# Patient Record
Sex: FEMALE | Race: WHITE | NOT HISPANIC OR LATINO | Employment: OTHER | URBAN - METROPOLITAN AREA
[De-identification: names, ages, dates, MRNs, and addresses within clinical notes are randomized per-mention and may not be internally consistent; named-entity substitution may affect disease eponyms.]

---

## 2017-06-30 ENCOUNTER — GENERIC CONVERSION - ENCOUNTER (OUTPATIENT)
Dept: OTHER | Facility: OTHER | Age: 57
End: 2017-06-30

## 2017-06-30 ENCOUNTER — ALLSCRIPTS OFFICE VISIT (OUTPATIENT)
Dept: OTHER | Facility: OTHER | Age: 57
End: 2017-06-30

## 2018-01-22 VITALS
SYSTOLIC BLOOD PRESSURE: 136 MMHG | DIASTOLIC BLOOD PRESSURE: 96 MMHG | TEMPERATURE: 98 F | OXYGEN SATURATION: 97 % | HEART RATE: 88 BPM | HEIGHT: 67 IN | BODY MASS INDEX: 27.64 KG/M2 | WEIGHT: 176.13 LBS

## 2018-01-24 NOTE — MISCELLANEOUS
Assessment   1  Heat exhaustion (992 5) (T67 5XXA)1      1 Amended By: Sharmaine Zarco; Jun 30 2017 8:15 AM EST    Discussion/Summary  Discussion Summary:   We discussed John Soto is recovering from heat exhaustion  May take a full month to recover completely  Some of her mental fogginess may be related to her medication for motion sickness  She does not have this with her but most likely this contains an antihistamine or another medicine that has anticholinergic effects such as Dramamine, so this medicine was discontinued  The concept of pre-hydration, as well as keeping up with fluid intake when out of doors on hot days when it's humid was discussed  When she does resume full activity, which I did not recommend until 4 months number episode of heat exhaustion, she should pay attention to any symptoms and given to worsen her conditioning and otherwise rest if she notices returning symptoms  Otherwise follow-up can be as needed  1        1 Amended By: Sharmaine Zarco; Jun 30 2017 8:20 AM EST    History of Present Illness  TCM Communication [facility]: The patient is being contacted for follow-up after hospitalization  She was hospitalized [facility] in [place]  The date of admission: 6/22/17, date of discharge: 6/24/17  Diagnosis: dizzyness/vomitting  She was discharged to home  She scheduled a follow up appointment  Symptoms: dizziness  Counseling was provided to the patient  Communication performed and completed by   HPI: John Soto is here today with her  and was last seen here on June 8, 2015  She was on vacation recently and playing golf in [place], and the weather was very hot and humid today before she became lightheaded and dizzy while outdoors playing golf on the second day   She then went to the local emergency room on June 22, whereby she received intravenous fluids, IV Zofran, had a noncontrast CT of the brain that was negative, have lab work consistent with dehydration, no evidence of infection, and was discharged the same day  She was given an over-the-counter medication for motion sickness, which he has been taking since  Her entire record was faxed to this office yesterday and was reviewed  She feels somewhat washed out, occasionally slightly lightheaded when she drops her head and then stands up  She feels slightly mentally foggy  There are no focal neurologic symptoms  There is no chest pain or shortness of breath  Her appetite is returned, and she is eating and drinking well and has not engaged in any athletic activities or has been out of doors on hot weather days  There are no new complaints since her return visit  1        1 Amended By: Yg Olivarez; Jun 30 2017 8:18 AM EST    Active Problems   1  Allergic rhinitis (477 9) (J30 9)  2  Anxiety (300 00) (F41 9)  3  Eczema (692 9) (L30 9)  4  Hyperlipidemia (272 4) (E78 5)  5  Osteoarthrosis of knee (715 36) (M17 10)  6  Raynaud's syndrome without gangrene (443 0) (I73 00)  7  Vitamin D deficiency (268 9) (E55 9)    Past Medical History   1  History of Cubital tunnel syndrome on left (354 2) (G56 22)  2  Denied: History of abnormal cervical Pap smear  3  History of esophageal reflux (V12 79) (Z87 19)  4  Denied: History of herpes simplex infection  5  Denied: History of pregnancy  6  History of varicella (V12 09) (Z86 19)    Surgical History   1  History of Colonoscopy (Fiberoptic) Cecum  2  History of Oral Surgery Tooth Extraction  3  History of Tonsillectomy    Family History  Father   1  Family history of hypercholesterolemia (V18 19) (Z83 42)  Maternal Grandmother   2  Family history of Brain Cancer (V16 8)  Maternal Grandfather   3  Family history of stroke (V17 1) (Z82 3)    Social History    · Alcohol use (V49 89) (Z78 9)   · Denied: History of drug use   · Never A Smoker   · Occupation   · Taoist Affiliation    Social History Reviewed: The social history was reviewed and updated today  1  The social history was reviewed and is unchanged  1        1 Amended By: Merly Ayon; Jun 30 2017 7:59 AM EST    Current Meds  1  Citracal Slow Release 600- MG-MG-UNIT Oral Tablet Extended Release 24 Hour;   take 2 tablet daily; Therapy: 23YKK5302 to Recorded  2  Glucosamine Chondroitin MSM Oral Tablet; take 2 tablet daily; Therapy: 79RLE4848 to Recorded  3  Montelukast Sodium 10 MG Oral Tablet; TAKE 1 TABLET DAILY; Therapy: 30GGN8163 to (Evaluate:40Cra8948)  Requested for: 30APB5631; Last   Rx:45Awp7597 Ordered  4  PARoxetine HCl - 10 MG Oral Tablet; Take 1 tablet daily; Therapy: 23EJR2751 to (Last Rx:45Rdf3116)  Requested for: 79Yle8345 Ordered  5  Vitamin C 500 MG Oral Tablet; TAKE 1 TABLET DAILY; Therapy: 70ACD0111 to Recorded  6  Vitamin E 400 UNIT Oral Capsule; take 1 capsule daily; Therapy: 58THL3195 to Recorded    Allergies   1  Penicillins  2  Sulfa Drugs    Physical Exam    Constitutional1  Appears well, vital signs stable with regular pulse at 72  Repeat blood pressure is 132/82 left arm sitting position  Skin and mucous membranes are well-hydrated  There is no rash  HEENT exam is normal  There is no nystagmus or inducible vertigo  Lungs are clear and cardiac exam is normal  There is no peripheral edema and circulation intact  Gait is steady and normal  Sensorium is clear  1           1 Amended By: Evita Rodriguez; Jun 30 2017 8:18 AM EST    Future Appointments    Date/Time Provider Specialty Site   06/30/2017 08:00 AM MALCOLM Bowers   Internal Medicine [facility]     Signatures   Electronically signed by : MALCOLM Green ; Jun 26 2017  9:39AM EST                       (Author)    Electronically signed by : MALCOLM Green ; Jun 30 2017  8:20AM EST                       (Author)

## 2018-04-10 DIAGNOSIS — F41.9 ANXIETY: Primary | ICD-10-CM

## 2018-04-10 RX ORDER — PAROXETINE 10 MG/1
10 TABLET, FILM COATED ORAL DAILY
Qty: 90 TABLET | Refills: 0 | Status: SHIPPED | OUTPATIENT
Start: 2018-04-10 | End: 2018-07-18 | Stop reason: SDUPTHER

## 2018-04-10 RX ORDER — PAROXETINE 10 MG/1
1 TABLET, FILM COATED ORAL DAILY
COMMUNITY
Start: 2013-05-31 | End: 2018-04-10 | Stop reason: SDUPTHER

## 2018-07-18 DIAGNOSIS — F41.9 ANXIETY: ICD-10-CM

## 2018-07-18 RX ORDER — PAROXETINE 10 MG/1
10 TABLET, FILM COATED ORAL DAILY
Qty: 90 TABLET | Refills: 1 | Status: SHIPPED | OUTPATIENT
Start: 2018-07-18 | End: 2018-07-20 | Stop reason: SDUPTHER

## 2018-07-20 DIAGNOSIS — F41.9 ANXIETY: ICD-10-CM

## 2018-07-20 RX ORDER — PAROXETINE 10 MG/1
10 TABLET, FILM COATED ORAL DAILY
Qty: 90 TABLET | Refills: 0 | Status: SHIPPED | OUTPATIENT
Start: 2018-07-20 | End: 2018-10-23 | Stop reason: SDUPTHER

## 2018-10-23 DIAGNOSIS — F41.9 ANXIETY: ICD-10-CM

## 2018-10-23 RX ORDER — PAROXETINE 10 MG/1
TABLET, FILM COATED ORAL
Qty: 90 TABLET | Refills: 0 | Status: SHIPPED | OUTPATIENT
Start: 2018-10-23 | End: 2019-01-21 | Stop reason: SDUPTHER

## 2019-01-21 DIAGNOSIS — F41.9 ANXIETY: ICD-10-CM

## 2019-01-21 RX ORDER — PAROXETINE 10 MG/1
TABLET, FILM COATED ORAL
Qty: 90 TABLET | Refills: 0 | Status: SHIPPED | OUTPATIENT
Start: 2019-01-21 | End: 2019-04-21 | Stop reason: SDUPTHER

## 2019-04-21 DIAGNOSIS — F41.9 ANXIETY: ICD-10-CM

## 2019-04-22 RX ORDER — PAROXETINE 10 MG/1
TABLET, FILM COATED ORAL
Qty: 90 TABLET | Refills: 0 | Status: SHIPPED | OUTPATIENT
Start: 2019-04-22 | End: 2019-07-23 | Stop reason: SDUPTHER

## 2019-05-16 ENCOUNTER — APPOINTMENT (OUTPATIENT)
Dept: LAB | Facility: CLINIC | Age: 59
End: 2019-05-16
Payer: COMMERCIAL

## 2019-05-16 ENCOUNTER — TRANSCRIBE ORDERS (OUTPATIENT)
Dept: LAB | Facility: CLINIC | Age: 59
End: 2019-05-16

## 2019-05-16 DIAGNOSIS — D75.89 BONE MARROW HYPERPLASIA: Primary | ICD-10-CM

## 2019-05-16 DIAGNOSIS — D75.89 BONE MARROW HYPERPLASIA: ICD-10-CM

## 2019-05-16 LAB
FOLATE SERPL-MCNC: >20 NG/ML (ref 3.1–17.5)
VIT B12 SERPL-MCNC: 780 PG/ML (ref 100–900)

## 2019-05-16 PROCEDURE — 36415 COLL VENOUS BLD VENIPUNCTURE: CPT

## 2019-05-16 PROCEDURE — 82607 VITAMIN B-12: CPT

## 2019-05-16 PROCEDURE — 82746 ASSAY OF FOLIC ACID SERUM: CPT

## 2019-07-23 DIAGNOSIS — F41.9 ANXIETY: ICD-10-CM

## 2019-07-23 RX ORDER — PAROXETINE 10 MG/1
TABLET, FILM COATED ORAL
Qty: 90 TABLET | Refills: 0 | Status: SHIPPED | OUTPATIENT
Start: 2019-07-23 | End: 2019-10-21 | Stop reason: SDUPTHER

## 2019-10-21 DIAGNOSIS — F41.9 ANXIETY: ICD-10-CM

## 2019-10-21 RX ORDER — PAROXETINE 10 MG/1
TABLET, FILM COATED ORAL
Qty: 90 TABLET | Refills: 4 | Status: SHIPPED | OUTPATIENT
Start: 2019-10-21

## 2019-12-11 ENCOUNTER — TELEPHONE (OUTPATIENT)
Dept: INTERNAL MEDICINE CLINIC | Facility: CLINIC | Age: 59
End: 2019-12-11

## 2020-08-21 ENCOUNTER — APPOINTMENT (EMERGENCY)
Dept: RADIOLOGY | Facility: HOSPITAL | Age: 60
End: 2020-08-21
Payer: COMMERCIAL

## 2020-08-21 ENCOUNTER — HOSPITAL ENCOUNTER (EMERGENCY)
Facility: HOSPITAL | Age: 60
Discharge: HOME | End: 2020-08-22
Attending: EMERGENCY MEDICINE
Payer: COMMERCIAL

## 2020-08-21 DIAGNOSIS — S01.01XA LACERATION OF SCALP, INITIAL ENCOUNTER: ICD-10-CM

## 2020-08-21 DIAGNOSIS — S09.90XA INJURY OF HEAD, INITIAL ENCOUNTER: ICD-10-CM
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
## 2020-08-21 PROCEDURE — G1004 CDSM NDSC: HCPCS

## 2020-08-21 PROCEDURE — 72125 CT NECK SPINE W/O DYE: CPT

## 2020-08-21 PROCEDURE — 99284 EMERGENCY DEPT VISIT MOD MDM: CPT

## 2020-08-21 PROCEDURE — 12002 RPR S/N/AX/GEN/TRNK2.6-7.5CM: CPT | Performed by: EMERGENCY MEDICINE

## 2020-08-21 PROCEDURE — 70450 CT HEAD/BRAIN W/O DYE: CPT

## 2020-08-21 PROCEDURE — 99282 EMERGENCY DEPT VISIT SF MDM: CPT | Performed by: EMERGENCY MEDICINE

## 2020-08-22 VITALS
BODY MASS INDEX: 31.34 KG/M2 | TEMPERATURE: 97.7 F | WEIGHT: 195 LBS | OXYGEN SATURATION: 97 % | SYSTOLIC BLOOD PRESSURE: 144 MMHG | HEART RATE: 75 BPM | DIASTOLIC BLOOD PRESSURE: 82 MMHG | RESPIRATION RATE: 18 BRPM | HEIGHT: 66 IN

## 2020-08-22 PROCEDURE — 90471 IMMUNIZATION ADMIN: CPT

## 2020-08-22 PROCEDURE — 90715 TDAP VACCINE 7 YRS/> IM: CPT | Performed by: EMERGENCY MEDICINE

## 2020-08-22 RX ADMIN — TETANUS TOXOID, REDUCED DIPHTHERIA TOXOID AND ACELLULAR PERTUSSIS VACCINE, ADSORBED 0.5 ML: 5; 2.5; 8; 8; 2.5 SUSPENSION INTRAMUSCULAR at 00:07

## 2020-08-22 NOTE — ED ATTENDING ATTESTATION
8/21/2020  I, Vincent Rodriguez MD, saw and evaluated the patient  I have discussed the patient with the resident/non-physician practitioner and agree with the resident's/non-physician practitioner's findings, Plan of Care, and MDM as documented in the resident's/non-physician practitioner's note, except where noted  All available labs and Radiology studies were reviewed  I was present for key portions of any procedure(s) performed by the resident/non-physician practitioner and I was immediately available to provide assistance  At this point I agree with the current assessment done in the Emergency Department  I have conducted an independent evaluation of this patient a history and physical is as follows:    ED Course         Critical Care Time  Procedures     57 yo female had 3 alcoholic drinks today fell multiple times and hit head  No loc  Pt with no complaints but noted to have laceration on scalp  Pt with abrasion on nose  No cp, no sob, no abdominal pain, no headache, no n/v/d  Vss, afebrile, lungs cta, rrr, appears intoxicated, abdomen soft nontender, scalp laceration 5 cm, no neuro deficits, no spinal tenderness  Ct head, cspine  Tetanus, staple

## 2020-08-22 NOTE — ED PROVIDER NOTES
History  Chief Complaint   Patient presents with    Fall     Patient fell twice tonight after having 3 vodka tonics  One fall was outside in the yard (witnessed by ) and another unwitnessed in the bathroom  Lac to left side of scalp  Nose swollen and painful  Sarah Meals is a 77-year-old woman with a significant past medical history presenting today after falling at home  Patient states she had several drinks out at dinner with friends  When her and her  arrived home, he let her out of the car and she fell face forward onto the driveway  At this time she bumped her nose and had a small abrasion to her nose  Her  helped her up into the house  She then went into the bathroom unaccompanied  Her 's heard a loud noise and found her lying on the bathroom floor with a lot of blood  He helped her up and then called EMS  EMS arrived, placed the patient in a c-collar, transferred the patient to the hospital, delivering no medications or treatment EN route  At the hospital patient states that she feels fine and wants to go home  She is very pleasant and cooperative  She has no complaints at this time  She reports no other injuries  Her  reports no other injuries  No LOC  She states that she has no neck pain, headache, lightheadedness, dizziness, shortness of breath, chest pain, palpitations, abdominal pain, extremity pain nausea, vomiting  Prior to Admission Medications   Prescriptions Last Dose Informant Patient Reported? Taking? PARoxetine (PAXIL) 10 mg tablet   No No   Sig: TAKE 1 TABLET DAILY      Facility-Administered Medications: None       History reviewed  No pertinent past medical history  History reviewed  No pertinent surgical history  History reviewed  No pertinent family history  I have reviewed and agree with the history as documented      E-Cigarette/Vaping     E-Cigarette/Vaping Substances     Social History     Tobacco Use    Smoking status: Never Smoker    Smokeless tobacco: Never Used   Substance Use Topics    Alcohol use: Yes     Frequency: Monthly or less     Drinks per session: 1 or 2     Binge frequency: Never    Drug use: Never        Review of Systems   Constitutional: Negative for chills, diaphoresis and fever  Respiratory: Negative for shortness of breath  Cardiovascular: Negative for chest pain and palpitations  Gastrointestinal: Negative for abdominal pain, diarrhea, nausea and vomiting  Genitourinary: Negative for pelvic pain  Musculoskeletal: Negative for back pain and neck pain  Skin: Positive for wound  Neurological: Negative for dizziness, weakness, light-headedness and headaches  All other systems reviewed and are negative  Physical Exam  ED Triage Vitals [08/21/20 2255]   Temperature Pulse Respirations Blood Pressure SpO2   97 7 °F (36 5 °C) 86 18 142/83 94 %      Temp Source Heart Rate Source Patient Position - Orthostatic VS BP Location FiO2 (%)   Oral Monitor Lying Right arm --      Pain Score       --             Orthostatic Vital Signs  Vitals:    08/21/20 2255 08/21/20 2300 08/22/20 0033   BP: 142/83 136/77 144/82   Pulse: 86 86 75   Patient Position - Orthostatic VS: Lying Lying Lying       Physical Exam  Constitutional:       General: She is not in acute distress  Appearance: Normal appearance  She is not ill-appearing or diaphoretic  HENT:      Head: Normocephalic  Abrasion and laceration present  Nose: Nose normal    Eyes:      General: No scleral icterus  Extraocular Movements: Extraocular movements intact  Conjunctiva/sclera: Conjunctivae normal       Pupils: Pupils are equal, round, and reactive to light  Neck:      Musculoskeletal: No muscular tenderness  Cardiovascular:      Rate and Rhythm: Normal rate and regular rhythm  Pulses: Normal pulses  Heart sounds: Normal heart sounds  No murmur     Pulmonary:      Effort: Pulmonary effort is normal  No respiratory distress  Breath sounds: Normal breath sounds  No wheezing or rales  Abdominal:      General: There is no distension  Palpations: Abdomen is soft  Tenderness: There is no abdominal tenderness  There is no guarding  Musculoskeletal:         General: No deformity  Neurological:      General: No focal deficit present  Mental Status: She is alert and oriented to person, place, and time  GCS: GCS eye subscore is 4  GCS verbal subscore is 5  GCS motor subscore is 6  Cranial Nerves: Cranial nerves are intact  No cranial nerve deficit, dysarthria or facial asymmetry  Sensory: No sensory deficit  Motor: Motor function is intact  No weakness  Coordination: Finger-Nose-Finger Test normal          ED Medications  Medications   tetanus-diphtheria-acellular pertussis (BOOSTRIX) IM injection 0 5 mL (0 5 mL Intramuscular Given 8/22/20 0007)       Diagnostic Studies  Results Reviewed     None                 CT head without contrast   Final Result by Jamin Schultz MD (08/22 7358)      1  No acute intracranial hemorrhage or mass effect  2  Bilateral nasal bone fractures  Workstation performed: [id number]         CT cervical spine without contrast   Final Result by Jamin Schultz MD (08/22 0031)      No acute cervical spine fracture or traumatic malalignment  Workstation performed: [id number]               Procedures  Procedures      ED Course       US AUDIT      Most Recent Value   Initial Alcohol Screen: US AUDIT-C    1  How often do you have a drink containing alcohol? 1 Filed at: 08/21/2020 2253   2  How many drinks containing alcohol do you have on a typical day you are drinking? 2 Filed at: 08/21/2020 2253   3b  FEMALE Any Age, or MALE 65+: How often do you have 4 or more drinks on one occassion?   0 Filed at: 08/21/2020 2253   Audit-C Score  3 Filed at: 08/21/2020 2253                  JOSESITO/DAST-10      Most Recent Value   How many times in the past year have you    Used an illegal drug or used a prescription medication for non-medical reasons? Never Filed at: 08/21/2020 4925                              MDM  Number of Diagnoses or Management Options  Fall, initial encounter:   Injury of head, initial encounter:   Laceration of scalp, initial encounter:   Diagnosis management comments: 55 yo woman presenting after mechanical fall at home while intoxicated  No other symptoms present before or after fall, low suspicion for other medical conditions causing the fall  Will evaluate with CT c-spine and head  Wound washed out with copious normal saline  Closed with staples  CT spine and head show nasal bone fractures  No intracranial hemorrhage present, no cervical spine fracture present  Patient re-evaluated at 12:40AM, still unsteady on feet   present at gait assessment, states he is uncomfortable taking her home in this condition  Will continue to allow patient to sober up and will reassess  Patient signed out to senior resident  Disposition  Final diagnoses:   Fall, initial encounter   Injury of head, initial encounter   Laceration of scalp, initial encounter     Time reflects when diagnosis was documented in both MDM as applicable and the Disposition within this note     Time User Action Codes Description Comment    8/22/2020 12:23 AM Radha Her Add [W19  SNFP] Fall, initial encounter     8/22/2020 12:23 AM Radha Her Add [G22 87TT] Injury of head, initial encounter     8/22/2020 12:23 AM Radha Her Add [S01 01XA] Laceration of scalp, initial encounter       ED Disposition     ED Disposition Condition Date/Time Comment    Discharge Stable Sat Aug 22, 2020 12:50 AM Zohreh Reed discharge to home/self care  Follow-up Information     Follow up With Specialties Details Why Contact Info Additional Information    Calista Sher MD Internal Medicine   [address]   [employer] [facility] Emergency Department Emergency Medicine  If symptoms worsen, For suture removal [address] [place] [address] ED, [address] [place] [address], [address] [phone]          Patient's Medications   Discharge Prescriptions    No medications on file     No discharge procedures on file  PDMP Review     None           ED Provider  Attending physically available and evaluated Merlyn Stahl I managed the patient along with the ED Attending      Electronically Signed by         Terrell Pantoja MD  08/22/20 3998       Terrell Pantoja MD  08/22/20 6179

## 2020-08-22 NOTE — ED NOTES
Dr Iván Barney at the bedside for patient evaluation at this time     Alaina Edwards RN  08/21/20 4422

## 2020-08-22 NOTE — DISCHARGE INSTRUCTIONS
You were seen in the emergency department today after a fall  We performed CT scan of your head and neck  We do not find fractures or bleeds  We stapled the laceration in your head shut  You should see a doctor in 10-14 days to have the staples removed  Please try to keep the area clean and dry  If you find the wound is becoming uncomfortable and tugging at your skin, you can apply Vaseline to the area  If you become confused, weak in one area, develop a headache, have any changes in your vision, please return to the emergency room  If you find that the wound is becoming red inflamed, draining pus, or you develop fevers, please return to the emergency room

## 2020-08-22 NOTE — ED PROCEDURE NOTE
PROCEDURE  Laceration repair    Date/Time: 8/22/2020 12:30 AM  Performed by: Shantelle Archuleta MD  Authorized by: Shantelle Archuleta MD   Consent: Verbal consent obtained    Consent given by: patient  Patient identity confirmed: verbally with patient and arm band  Body area: head/neck  Location details: scalp  Laceration length: 4 cm  Foreign bodies: no foreign bodies  Tendon involvement: none  Nerve involvement: none  Vascular damage: no  Anesthesia: local infiltration    Anesthesia:  Local Anesthetic: lidocaine 1% with epinephrine  Anesthetic total: 4 mL    Sedation:  Patient sedated: no      Wound Dehiscence:  Superficial Wound Dehiscence: simple closure      Procedure Details:  Irrigation solution: tap water  Skin closure: staples  Number of sutures: 6  Patient tolerance: Patient tolerated the procedure well with no immediate complications           Shantelle Archuleta MD  08/22/20 1026

## 2021-09-23 ENCOUNTER — APPOINTMENT (OUTPATIENT)
Dept: RADIOLOGY | Facility: CLINIC | Age: 61
End: 2021-09-23
Payer: COMMERCIAL

## 2021-09-23 DIAGNOSIS — M54.50 LOW BACK PAIN, UNSPECIFIED BACK PAIN LATERALITY, UNSPECIFIED CHRONICITY, UNSPECIFIED WHETHER SCIATICA PRESENT: ICD-10-CM

## 2021-09-23 DIAGNOSIS — M54.10 RADICULAR SYNDROME OF LOWER LIMBS: ICD-10-CM

## 2021-09-23 PROCEDURE — 72050 X-RAY EXAM NECK SPINE 4/5VWS: CPT

## 2021-09-23 PROCEDURE — 72110 X-RAY EXAM L-2 SPINE 4/>VWS: CPT

## 2022-05-20 ENCOUNTER — OFFICE VISIT (OUTPATIENT)
Dept: URGENT CARE | Facility: CLINIC | Age: 62
End: 2022-05-20
Payer: COMMERCIAL

## 2022-05-20 VITALS
HEART RATE: 68 BPM | HEIGHT: 66 IN | DIASTOLIC BLOOD PRESSURE: 98 MMHG | TEMPERATURE: 98.7 F | OXYGEN SATURATION: 98 % | WEIGHT: 191 LBS | SYSTOLIC BLOOD PRESSURE: 130 MMHG | BODY MASS INDEX: 30.7 KG/M2 | RESPIRATION RATE: 16 BRPM

## 2022-05-20 DIAGNOSIS — H92.02 EARACHE ON LEFT: Primary | ICD-10-CM

## 2022-05-20 PROCEDURE — 99213 OFFICE O/P EST LOW 20 MIN: CPT | Performed by: PHYSICIAN ASSISTANT

## 2022-05-20 RX ORDER — CALCIUM CARBONATE/VITAMIN D3 600 MG-10
TABLET ORAL
COMMUNITY

## 2022-05-20 RX ORDER — METOPROLOL SUCCINATE 50 MG/1
1 TABLET, EXTENDED RELEASE ORAL DAILY
COMMUNITY
Start: 2021-11-15 | End: 2022-11-16

## 2022-05-20 RX ORDER — BUDESONIDE 0.25 MG/2ML
INHALANT ORAL
COMMUNITY

## 2022-05-20 RX ORDER — SPIRONOLACTONE 50 MG/1
1 TABLET, FILM COATED ORAL DAILY
COMMUNITY
Start: 2021-12-24

## 2022-05-20 RX ORDER — ATORVASTATIN CALCIUM 40 MG/1
TABLET, FILM COATED ORAL
COMMUNITY

## 2022-05-20 RX ORDER — OMEPRAZOLE 40 MG/1
40 CAPSULE, DELAYED RELEASE ORAL
COMMUNITY

## 2022-05-20 RX ORDER — MELOXICAM 15 MG/1
15 TABLET ORAL
COMMUNITY

## 2022-05-20 RX ORDER — MONTELUKAST SODIUM 10 MG/1
TABLET ORAL
COMMUNITY
Start: 2013-03-22

## 2022-05-20 RX ORDER — VALSARTAN AND HYDROCHLOROTHIAZIDE 320; 25 MG/1; MG/1
1 TABLET, FILM COATED ORAL
COMMUNITY
Start: 2022-02-10 | End: 2022-08-10

## 2022-05-20 RX ORDER — LEVOTHYROXINE SODIUM 0.07 MG/1
1 TABLET ORAL DAILY
COMMUNITY
Start: 2022-01-17

## 2022-05-20 NOTE — PROGRESS NOTES
[facility]        NAME: Walter Braswell is a 59 y o  female  : 1960    MRN: [id number]  DATE: May 20, 2022  TIME: 5:04 PM    Assessment and Plan   Earache on left [H92 02]  1  Earache on left           Patient Instructions     External ear canals completely void of cerumen b/l  TMs clear w/ good cone of light b/l  Recommend OTC flonase or nasacort  Monitor for worsening symptoms  Discussed possible eustachian tube dysfunction  Follow up with PCP in 3-5 days  Proceed to  ER if symptoms worsen  Chief Complaint     Chief Complaint   Patient presents with    Cerumen Impaction     Left ear clogged since   History of Present Illness       Patient presents with complaint of her left ear feeling clogged since   She describes her hearing as muffled and states it initially felt like there was a vibration when people were talking to her  She states that she has tried over-the-counter earwax softening drops followed by sudafed without improvement  She states that she was able to pop the right ear with a valsalva maneuver but not the left  She denies fever, chills, sweats, congestion, sinus pressure, ear pain, and drainage  She reports taking zyrtec for allergies  Review of Systems   Review of Systems   Constitutional: Negative for chills and fever  HENT: Negative for congestion, ear discharge, ear pain and sore throat  Eyes: Negative for pain and visual disturbance  Respiratory: Negative for cough and shortness of breath  Cardiovascular: Negative for chest pain and palpitations  Gastrointestinal: Negative for abdominal pain and vomiting  Genitourinary: Negative for dysuria and hematuria  Musculoskeletal: Negative for arthralgias and back pain  Skin: Negative for color change and rash  Neurological: Negative for seizures and syncope  All other systems reviewed and are negative          Current Medications       Current Outpatient Medications:    levothyroxine 75 mcg tablet, Take 1 tablet by mouth in the morning , Disp: , Rfl:     metoprolol succinate (TOPROL-XL) 50 mg 24 hr tablet, Take 1 tablet by mouth in the morning , Disp: , Rfl:     montelukast (SINGULAIR) 10 mg tablet, , Disp: , Rfl:     spironolactone (ALDACTONE) 50 mg tablet, Take 1 tablet by mouth in the morning , Disp: , Rfl:     valsartan-hydrochlorothiazide (DIOVAN-HCT) 320-25 MG per tablet, Take 1 tablet by mouth, Disp: , Rfl:     atorvastatin (LIPITOR) 40 mg tablet, atorvastatin Take No date recorded No form recorded No frequency recorded No route recorded No set duration recorded No set duration amount recorded active No dosage strength recorded No dosage strength units of measure recorded, Disp: , Rfl:     budesonide (Pulmicort) 0 25 mg/2 mL nebulizer solution, Inhale, Disp: , Rfl:     Calcium Carb-Cholecalciferol 600-400 MG-UNIT TABS, Take by mouth, Disp: , Rfl:     meloxicam (MOBIC) 15 mg tablet, Take 15 mg by mouth, Disp: , Rfl:     omeprazole (PriLOSEC) 40 MG capsule, Take 40 mg by mouth, Disp: , Rfl:     PARoxetine (PAXIL) 10 mg tablet, TAKE 1 TABLET DAILY, Disp: 90 tablet, Rfl: 4    Current Allergies     Allergies as of 05/20/2022 - Reviewed 05/20/2022   Allergen Reaction Noted    Amlodipine Other (See Comments) 05/13/2021    Sulfa antibiotics  08/21/2020    Penicillins Rash 08/21/2020            The following portions of the patient's history were reviewed and updated as appropriate: allergies, current medications, past family history, past medical history, past social history, past surgical history and problem list      No past medical history on file  No past surgical history on file  No family history on file  Medications have been verified  Objective   /98   Pulse 68   Temp 98 7 °F (37 1 °C)   Resp 16   Ht 5' 6" (1 676 m)   Wt 86 6 kg (191 lb)   SpO2 98%   BMI 30 83 kg/m²   No LMP recorded   Patient is postmenopausal        Physical Exam Physical Exam  Vitals and nursing note reviewed  Constitutional:       General: She is not in acute distress  Appearance: Normal appearance  She is well-developed  She is not ill-appearing or diaphoretic  HENT:      Head: Normocephalic and atraumatic  Right Ear: Tympanic membrane, ear canal and external ear normal  There is no impacted cerumen  Left Ear: Tympanic membrane, ear canal and external ear normal  There is no impacted cerumen  Nose: Rhinorrhea present  Mouth/Throat:      Mouth: Mucous membranes are moist       Pharynx: Oropharynx is clear  No oropharyngeal exudate or posterior oropharyngeal erythema  Eyes:      General:         Right eye: No discharge  Left eye: No discharge  Conjunctiva/sclera: Conjunctivae normal       Pupils: Pupils are equal, round, and reactive to light  Cardiovascular:      Rate and Rhythm: Normal rate and regular rhythm  Heart sounds: Normal heart sounds  Pulmonary:      Effort: Pulmonary effort is normal  No respiratory distress  Breath sounds: Normal breath sounds  No stridor  Musculoskeletal:      Cervical back: Normal range of motion and neck supple  Lymphadenopathy:      Cervical: No cervical adenopathy  Skin:     General: Skin is warm and dry  Capillary Refill: Capillary refill takes less than 2 seconds  Findings: No rash  Neurological:      Mental Status: She is alert and oriented to person, place, and time  Cranial Nerves: No cranial nerve deficit  Sensory: No sensory deficit  Psychiatric:         Behavior: Behavior normal          Thought Content:  Thought content normal

## 2022-06-28 DIAGNOSIS — S00.06XA TICK BITE OF SCALP, INITIAL ENCOUNTER: Primary | ICD-10-CM
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
## 2022-06-28 RX ORDER — DOXYCYCLINE 100 MG/1
TABLET ORAL
Qty: 2 TABLET | Refills: 0 | Status: SHIPPED | OUTPATIENT
Start: 2022-06-28 | End: 2022-06-28

## 2022-07-20 ENCOUNTER — HOSPITAL ENCOUNTER (OUTPATIENT)
Dept: RADIOLOGY | Facility: HOSPITAL | Age: 62
Discharge: HOME | End: 2022-07-20
Attending: STUDENT IN AN ORGANIZED HEALTH CARE EDUCATION/TRAINING PROGRAM
Payer: COMMERCIAL

## 2022-07-20 DIAGNOSIS — H91.8X9 ASYMMETRICAL HEARING LOSS: ICD-10-CM

## 2022-07-20 PROCEDURE — A9585 GADOBUTROL INJECTION: HCPCS | Performed by: RADIOLOGY

## 2022-07-20 PROCEDURE — G1004 CDSM NDSC: HCPCS

## 2022-07-20 PROCEDURE — 70553 MRI BRAIN STEM W/O & W/DYE: CPT

## 2022-07-20 RX ADMIN — GADOBUTROL 8 ML: 604.72 INJECTION INTRAVENOUS at 19:45

## 2023-06-22 ENCOUNTER — HOSPITAL ENCOUNTER (OUTPATIENT)
Dept: RADIOLOGY | Facility: HOSPITAL | Age: 63
Discharge: HOME | End: 2023-06-22
Attending: ORTHOPAEDIC SURGERY
Payer: COMMERCIAL

## 2023-06-22 ENCOUNTER — OFFICE VISIT (OUTPATIENT)
Dept: OBGYN CLINIC | Facility: HOSPITAL | Age: 63
End: 2023-06-22
Payer: COMMERCIAL

## 2023-06-22 VITALS
HEART RATE: 93 BPM | WEIGHT: 194 LBS | SYSTOLIC BLOOD PRESSURE: 115 MMHG | BODY MASS INDEX: 31.18 KG/M2 | HEIGHT: 66 IN | DIASTOLIC BLOOD PRESSURE: 73 MMHG

## 2023-06-22 DIAGNOSIS — M25.562 PAIN IN BOTH KNEES, UNSPECIFIED CHRONICITY: ICD-10-CM

## 2023-06-22 DIAGNOSIS — M17.0 PRIMARY OSTEOARTHRITIS OF BOTH KNEES: ICD-10-CM

## 2023-06-22 DIAGNOSIS — M25.561 PAIN IN BOTH KNEES, UNSPECIFIED CHRONICITY: ICD-10-CM

## 2023-06-22 DIAGNOSIS — M25.562 PAIN IN BOTH KNEES, UNSPECIFIED CHRONICITY: Primary | ICD-10-CM

## 2023-06-22 DIAGNOSIS — M25.561 PAIN IN BOTH KNEES, UNSPECIFIED CHRONICITY: Primary | ICD-10-CM

## 2023-06-22 PROCEDURE — 73562 X-RAY EXAM OF KNEE 3: CPT

## 2023-06-22 PROCEDURE — 20610 DRAIN/INJ JOINT/BURSA W/O US: CPT | Performed by: ORTHOPAEDIC SURGERY

## 2023-06-22 PROCEDURE — 99203 OFFICE O/P NEW LOW 30 MIN: CPT | Performed by: ORTHOPAEDIC SURGERY

## 2023-06-22 RX ORDER — METOPROLOL SUCCINATE 25 MG/1
TABLET, EXTENDED RELEASE ORAL
COMMUNITY
Start: 2023-05-24

## 2023-06-22 RX ORDER — TRIAMCINOLONE ACETONIDE 1 MG/G
CREAM TOPICAL
COMMUNITY
Start: 2023-05-25

## 2023-06-22 RX ORDER — HYALURONATE SODIUM 20 MG/2 ML
SYRINGE (ML) INTRAARTICULAR
COMMUNITY
Start: 2023-06-21

## 2023-06-22 RX ORDER — TRAZODONE HYDROCHLORIDE 50 MG/1
1 TABLET ORAL
COMMUNITY
Start: 2023-05-25

## 2023-06-22 RX ORDER — BETAMETHASONE SODIUM PHOSPHATE AND BETAMETHASONE ACETATE 3; 3 MG/ML; MG/ML
12 INJECTION, SUSPENSION INTRA-ARTICULAR; INTRALESIONAL; INTRAMUSCULAR; SOFT TISSUE
Status: COMPLETED | OUTPATIENT
Start: 2023-06-22 | End: 2023-06-22

## 2023-06-22 RX ORDER — BUPIVACAINE HYDROCHLORIDE 2.5 MG/ML
2 INJECTION, SOLUTION INFILTRATION; PERINEURAL
Status: COMPLETED | OUTPATIENT
Start: 2023-06-22 | End: 2023-06-22

## 2023-06-22 RX ORDER — NALTREXONE HYDROCHLORIDE 50 MG/1
TABLET, FILM COATED ORAL
COMMUNITY
Start: 2023-05-29

## 2023-06-22 RX ADMIN — BUPIVACAINE HYDROCHLORIDE 2 ML: 2.5 INJECTION, SOLUTION INFILTRATION; PERINEURAL at 13:00

## 2023-06-22 RX ADMIN — BETAMETHASONE SODIUM PHOSPHATE AND BETAMETHASONE ACETATE 12 MG: 3; 3 INJECTION, SUSPENSION INTRA-ARTICULAR; INTRALESIONAL; INTRAMUSCULAR; SOFT TISSUE at 13:00

## 2023-06-22 NOTE — PROGRESS NOTES
57 y o female who is an avid golfer who works for the [employer] presents for evaluation  She describes atraumatic onset gradual worsening weightbearing pain in both knees  She has pain at the level of both knee joint, the pain is made worse bearing weight, the pain increases with increased activities  Oftentimes interferes with ability to a send and descend varus, and interferes her ability to golf regularly  Her pain score is approximate 7-9 out of 10 on both the right and left knee  Review of Systems  Review of systems negative unless otherwise specified in HPI    Past Medical History  History reviewed  No pertinent past medical history  Past Surgical History  History reviewed  No pertinent surgical history  Current Medications  Current Outpatient Medications on File Prior to Visit   Medication Sig Dispense Refill   • naltrexone (REVIA) 50 mg tablet Take 1 to 1 1/2 tablets daily to help curve alcohol cravings     • traZODone (DESYREL) 50 mg tablet Take 1 tablet by mouth daily at bedtime     • atorvastatin (LIPITOR) 40 mg tablet atorvastatin Take No date recorded No form recorded No frequency recorded No route recorded No set duration recorded No set duration amount recorded active No dosage strength recorded No dosage strength units of measure recorded     • budesonide (PULMICORT) 0 25 mg/2 mL nebulizer solution Inhale (Patient not taking: No sig reported)     • Calcium Carb-Cholecalciferol 600-400 MG-UNIT TABS Take by mouth     • Euflexxa 20 MG/2ML SOSY      • levothyroxine 75 mcg tablet Take 1 tablet by mouth in the morning  • meloxicam (MOBIC) 15 mg tablet Take 15 mg by mouth     • metoprolol succinate (TOPROL-XL) 25 mg 24 hr tablet      • metoprolol succinate (TOPROL-XL) 50 mg 24 hr tablet Take 1 tablet by mouth in the morning       • montelukast (SINGULAIR) 10 mg tablet      • omeprazole (PriLOSEC) 40 MG capsule Take 40 mg by mouth     • PARoxetine (PAXIL) 10 mg tablet TAKE 1 TABLET DAILY 90 tablet 4 • predniSONE 10 mg tablet Take 60 mg by mouth daily for 4 days, then 20 mg day for 3 days, then 10 mg for 3 days (Total of 10 days) 19 tablet 0   • spironolactone (ALDACTONE) 50 mg tablet Take 1 tablet by mouth in the morning  • triamcinolone (KENALOG) 0 1 % cream      • valsartan-hydrochlorothiazide (DIOVAN-HCT) 320-25 MG per tablet Take 1 tablet by mouth       No current facility-administered medications on file prior to visit  Recent Labs [facility])  0   Lab Value Date/Time    HCT 41 5 10/29/2015 0914    HGB 14 2 10/29/2015 0914    WBC 5 65 10/29/2015 0914    GLUCOSE 86 10/29/2015 0914         Physical exam  · General: Awake, Alert, Oriented  · Eyes: Pupils equal, round and reactive to light  · Heart: regular rate and rhythm  · Lungs: No audible wheezing  · Abdomen: soft  Examination confirms a well tanned individual   She walks with some antalgia  Neither hip is motionless  Neither thighs atrophy  Both knees are in valgus  There is periarticular swelling but no well-defined effusion  Each knee has bony enlargement tenderness laterally  Each knee is crepitation flexion-extension  Neither knee is palpable though the synovium  Calf compartments of both are soft and supple  Toes of both feet are warm, sensate, mobile    Imaging  I have personally reviewed standing x-rays of both knees and my interpretation as follows: Greater than 50% lateral compartment joint space narrowing seen bilaterally  Lateral view show bone-on-bone apposition of the patellofemoral compartments bilaterally    Procedure  Injections of corticosteroid are provided for bilateral knee joints    They are documented below    Large joint arthrocentesis: R knee  Universal Protocol:  Consent given by: patient    Supporting Documentation  Indications: pain   Procedure Details  Location: knee - R knee  Needle size: 22 G  Ultrasound guidance: no  Medications administered: 2 mL bupivacaine 0 25 %; 12 mg betamethasone acetate-betamethasone sodium phosphate 6 (3-3) mg/mL    Patient tolerance: patient tolerated the procedure well with no immediate complications  Dressing:  Sterile dressing applied    Large joint arthrocentesis: L knee  Universal Protocol:  Consent given by: patient    Supporting Documentation  Indications: pain   Procedure Details  Location: knee - L knee  Needle size: 22 G  Medications administered: 2 mL bupivacaine 0 25 %; 12 mg betamethasone acetate-betamethasone sodium phosphate 6 (3-3) mg/mL    Patient tolerance: patient tolerated the procedure well with no immediate complications  Dressing:  Sterile dressing applied            Assessment/Plan:   57 y  o female who has symptomatic osteoarthritis of both knees  All diagnoses were discussed with the patient  Treatment option clued risk, benefits, return discussed in detail  Surgical treatment was discussed  Patient wishes to defer surgery till October 2024  In the interim, injections of corticosteroid indicated  They are advised, accepted, administer as outlined above    Office follow-up on an as-needed basis my recommendation

## 2023-06-23 ENCOUNTER — APPOINTMENT (OUTPATIENT)
Dept: RADIOLOGY | Facility: CLINIC | Age: 63
End: 2023-06-23
Payer: COMMERCIAL

## 2023-06-23 DIAGNOSIS — R05.3 CHRONIC COUGH: ICD-10-CM

## 2023-06-23 PROCEDURE — 71046 X-RAY EXAM CHEST 2 VIEWS: CPT

## 2023-08-10 ENCOUNTER — TRANSITIONAL CARE MANAGEMENT (OUTPATIENT)
Dept: INTERNAL MEDICINE CLINIC | Facility: CLINIC | Age: 63
End: 2023-08-10

## 2023-08-10 PROBLEM — E03.9 ACQUIRED HYPOTHYROIDISM: Status: ACTIVE | Noted: 2018-03-12

## 2023-08-10 PROBLEM — E78.2 MIXED HYPERLIPIDEMIA: Status: ACTIVE | Noted: 2018-03-12

## 2023-08-10 PROBLEM — I47.1 SVT (SUPRAVENTRICULAR TACHYCARDIA) (HCC): Status: ACTIVE | Noted: 2023-08-07

## 2023-08-10 PROBLEM — I47.10 SVT (SUPRAVENTRICULAR TACHYCARDIA): Status: ACTIVE | Noted: 2023-08-07

## 2023-08-10 PROBLEM — Z86.19 HISTORY OF HEPATITIS B: Status: ACTIVE | Noted: 2018-02-12

## 2023-08-10 PROBLEM — K44.9 HIATAL HERNIA: Status: ACTIVE | Noted: 2021-01-14

## 2023-08-10 PROBLEM — K76.0 HEPATIC STEATOSIS: Status: ACTIVE | Noted: 2022-05-03

## 2023-08-10 PROBLEM — I10 PRIMARY HYPERTENSION: Status: ACTIVE | Noted: 2021-01-14

## 2023-08-10 PROBLEM — F32.5: Status: ACTIVE | Noted: 2018-02-12

## 2023-08-10 RX ORDER — FOLIC ACID 1 MG/1
1 TABLET ORAL DAILY
COMMUNITY

## 2023-08-10 RX ORDER — METOPROLOL SUCCINATE 50 MG/1
50 TABLET, EXTENDED RELEASE ORAL DAILY
Qty: 30 TABLET | Refills: 11 | Status: CANCELLED
Start: 2023-08-10 | End: 2024-08-10

## 2023-08-10 RX ORDER — EZETIMIBE 10 MG/1
10 TABLET ORAL DAILY
COMMUNITY
Start: 2023-06-25

## 2023-08-11 ENCOUNTER — OFFICE VISIT (OUTPATIENT)
Dept: INTERNAL MEDICINE CLINIC | Facility: CLINIC | Age: 63
End: 2023-08-11
Payer: MEDICARE

## 2023-08-11 VITALS
DIASTOLIC BLOOD PRESSURE: 68 MMHG | BODY MASS INDEX: 31.66 KG/M2 | HEART RATE: 62 BPM | SYSTOLIC BLOOD PRESSURE: 120 MMHG | HEIGHT: 66 IN | OXYGEN SATURATION: 92 % | WEIGHT: 197 LBS | TEMPERATURE: 97.9 F

## 2023-08-11 DIAGNOSIS — E78.2 MIXED HYPERLIPIDEMIA: ICD-10-CM

## 2023-08-11 DIAGNOSIS — K21.9 GASTROESOPHAGEAL REFLUX DISEASE, UNSPECIFIED WHETHER ESOPHAGITIS PRESENT: ICD-10-CM

## 2023-08-11 DIAGNOSIS — N19 ACUTE PRERENAL AZOTEMIA: ICD-10-CM

## 2023-08-11 DIAGNOSIS — I47.1 SVT (SUPRAVENTRICULAR TACHYCARDIA) (HCC): Primary | ICD-10-CM

## 2023-08-11 PROCEDURE — 99496 TRANSJ CARE MGMT HIGH F2F 7D: CPT | Performed by: INTERNAL MEDICINE

## 2023-08-11 RX ORDER — METOPROLOL TARTRATE 50 MG/1
50 TABLET, FILM COATED ORAL EVERY 12 HOURS SCHEDULED
Qty: 180 TABLET | Refills: 3 | Status: SHIPPED | OUTPATIENT
Start: 2023-08-11

## 2023-08-11 RX ORDER — OMEPRAZOLE 40 MG/1
CAPSULE, DELAYED RELEASE ORAL
Qty: 90 CAPSULE | Refills: 0 | Status: SHIPPED | OUTPATIENT
Start: 2023-08-11 | End: 2023-10-10

## 2023-08-11 RX ORDER — METOPROLOL TARTRATE 50 MG/1
TABLET, FILM COATED ORAL
COMMUNITY
Start: 2023-08-08 | End: 2023-08-11

## 2023-08-11 RX ORDER — ATORVASTATIN CALCIUM 40 MG/1
40 TABLET, FILM COATED ORAL DAILY
Qty: 90 TABLET | Refills: 3 | Status: SHIPPED | OUTPATIENT
Start: 2023-08-11

## 2023-08-11 NOTE — PROGRESS NOTES
Assessment and Plan:    1. SVT (supraventricular tachycardia) ([address])  Assessment & Plan:  Recent ED visit at [facility] for chest pressure and elevated heart rate at 180s. Diagnosed with PSVT and type II MI. Resolved with adenosine. Discharged on increased metoprolol. Denies any recent episodes of chest pressure, palpitations, elevated heart rate or other symptoms. Also reports that prior fatigue has currently resolved. Does not follow with cardiology in [place]. Previously seen by cardiology in [place]. Referral to cardiology in network  Continue metoprolol tartrate 50 mg twice a day  Encouraged to continue monitoring blood pressure and keeping a BP log    Orders:  -     metoprolol tartrate (LOPRESSOR) 50 mg tablet; Take 1 tablet (50 mg total) by mouth every 12 (twelve) hours  -     Ambulatory Referral to Cardiology; Future    2. Acute prerenal azotemia    3. Gastroesophageal reflux disease, unspecified whether esophagitis present  Assessment & Plan:  History of GERD and hiatal hernia. .  Home regimen includes Prilosec daily. Reports chronic cough. Described that mainly at night and does express some ongoing reflux. Reports having GI provider in [place]    Increase Prilosec twice daily x 30 days, then continue daily  Can consider Pepcid if symptoms ongoing    Orders:  -     omeprazole (PriLOSEC) 40 MG capsule; Take 1 capsule (40 mg total) by mouth 2 (two) times a day for 30 days, THEN 1 capsule (40 mg total) daily. 4. Mixed hyperlipidemia  Assessment & Plan:  Previously stopped Lipitor given cough. However, symptoms unrelated to Lipitor. Recent lipid panel - high TG, LDL, TC    Restart Lipitor 40 daily  Continue ezetimibe 10 mg daily    Orders:  -     atorvastatin (LIPITOR) 40 mg tablet; Take 1 tablet (40 mg total) by mouth daily               HPI:     Glinda Riedel is  70 y.o. female with history of PSVT, OA, HLD, GERD and HTN that arrives to clinic for a transition of care visit.      Recently seen in an ED in [facility] at [facility] for chest pressure and elevated HR at 180s, managed for PSVT with adenosine, diagnosed as a Type 2 MI, and discharged with increased metoprolol frequency (twice a day) and dose (50 mg). Has been monitoring her blood pressure at home since medication change, and it reported an episode of SBP at 180s but not recurrent. Patient reports no reoccurrence of symptoms. Patient also reported that prior to ED visit, she had felt some fatigue over a few weeks which since change in medication has resolved. Currently denies chest pain, shortness of breath, palpitations, dizziness, lightheadedness, and denies any low blood pressures. Patient also expresses chronic cough at night. Patient previously associated with taking Lipitor, as her father and brother expressed to her that it was likely the culprit. Patient reported this to her doctor in [place] who recommended to discontinue Lipitor and start Zetia. However, patient's cough is still ongoing. Does express that she has recurrent acid reflux even on daily Prilosec. Patient medical care is mainly in [place] and spends the summer in [place] (usually half the year). Patient does not follow with any cardiologist.          Patient has no other complaints at this time. Subjective:    Review of Systems   Constitutional: Negative for chills, diaphoresis, fatigue and fever. HENT: Negative for ear pain and sore throat. Eyes: Negative for pain and visual disturbance. Respiratory: Negative for cough and shortness of breath. Cardiovascular: Negative for chest pain, palpitations and leg swelling. Gastrointestinal: Negative for abdominal pain, diarrhea, nausea and vomiting. Reflux   Genitourinary: Negative for dysuria and hematuria. Musculoskeletal: Negative for arthralgias and back pain. Skin: Negative for color change and rash.    Neurological: Negative for dizziness, seizures, syncope, weakness, light-headedness and headaches. All other systems reviewed and are negative. Patient Active Problem List   Diagnosis   • Primary osteoarthritis of both knees   • Vitamin D deficiency   • SVT (supraventricular tachycardia) (HCC)   • Single major depressive episode, in full remission ([address])   • Raynaud's syndrome without gangrene   • Primary hypertension   • Mixed hyperlipidemia   • History of hepatitis B   • Hiatal hernia   • Hepatic steatosis   • Eczema   • Allergic rhinitis   • Acquired hypothyroidism   • Gastroesophageal reflux disease        Current Outpatient Medications   Medication Sig Dispense Refill   • atorvastatin (LIPITOR) 40 mg tablet Take 1 tablet (40 mg total) by mouth daily 90 tablet 3   • Calcium Carb-Cholecalciferol 600-400 MG-UNIT TABS Take by mouth     • cyanocobalamin (VITAMIN B-12) 1000 MCG tablet Take 1,000 mcg by mouth in the morning     • ezetimibe (ZETIA) 10 mg tablet Take 10 mg by mouth in the morning     • folic acid (FOLVITE) 1 mg tablet Take 1 mg by mouth in the morning     • levothyroxine 75 mcg tablet Take 1 tablet by mouth in the morning. • meloxicam (MOBIC) 15 mg tablet Take 15 mg by mouth     • metoprolol tartrate (LOPRESSOR) 50 mg tablet Take 1 tablet (50 mg total) by mouth every 12 (twelve) hours 180 tablet 3   • omeprazole (PriLOSEC) 40 MG capsule Take 1 capsule (40 mg total) by mouth 2 (two) times a day for 30 days, THEN 1 capsule (40 mg total) daily. 90 capsule 0   • PARoxetine (PAXIL) 10 mg tablet TAKE 1 TABLET DAILY 90 tablet 4   • spironolactone (ALDACTONE) 50 mg tablet Take 1 tablet by mouth in the morning.      • triamcinolone (KENALOG) 0.1 % cream      • valsartan-hydrochlorothiazide (DIOVAN-HCT) 320-25 MG per tablet Take 1 tablet by mouth     • budesonide (PULMICORT) 0.25 mg/2 mL nebulizer solution Inhale (Patient not taking: Reported on 6/14/2022)     • Euflexxa 20 MG/2ML SOSY  (Patient not taking: Reported on 8/11/2023)       No current facility-administered medications for this visit. Allergies   Allergen Reactions   • Amlodipine Other (See Comments)     Edema  Edema     • Atorvastatin Myalgia     Cough   • Sulfa Antibiotics    • Penicillins Rash        The following portions of the patient's history were reviewed and updated as appropriate: allergies, current medications, past family history, past medical history, past social history, past surgical history and problem list.             Objective:    /68 (BP Location: Right arm, Patient Position: Sitting)   Pulse 62   Temp 97.9 °F (36.6 °C)   Ht 5' 6" (1.676 m)   Wt 89.4 kg (197 lb)   SpO2 92%   BMI 31.80 kg/m²      Body mass index is 31.8 kg/m². Physical Exam  Vitals reviewed. Constitutional:       Appearance: Normal appearance. She is normal weight. She is not ill-appearing or diaphoretic. HENT:      Head: Normocephalic and atraumatic. Mouth/Throat:      Mouth: Mucous membranes are moist.      Pharynx: Oropharynx is clear. Eyes:      General: No scleral icterus. Conjunctiva/sclera: Conjunctivae normal.   Cardiovascular:      Rate and Rhythm: Normal rate and regular rhythm. Pulses: Normal pulses. Heart sounds: Normal heart sounds. No murmur heard. No gallop. Pulmonary:      Effort: Pulmonary effort is normal. No respiratory distress. Breath sounds: Normal breath sounds. No wheezing or rales. Abdominal:      General: Bowel sounds are normal. There is no distension. Palpations: Abdomen is soft. There is no mass. Tenderness: There is no abdominal tenderness. Musculoskeletal:         General: Normal range of motion. Cervical back: Normal range of motion and neck supple. Right lower leg: No edema. Left lower leg: No edema. Skin:     General: Skin is warm and dry. Capillary Refill: Capillary refill takes less than 2 seconds. Coloration: Skin is not jaundiced or pale. Neurological:      General: No focal deficit present.       Mental Status: She is alert and oriented to person, place, and time. Mental status is at baseline. Sensory: No sensory deficit.    Psychiatric:         Mood and Affect: Mood normal.         Behavior: Behavior normal.

## 2023-08-11 NOTE — ASSESSMENT & PLAN NOTE
Prescription eprescribed via computer as listed on medical record.  Per Dr. Guzman     · Recent ED visit at [facility] for chest pressure and elevated heart rate at 180s. Diagnosed with PSVT and type II MI. Resolved with adenosine. Discharged on increased metoprolol. · Denies any recent episodes of chest pressure, palpitations, elevated heart rate or other symptoms. Also reports that prior fatigue has currently resolved. · Does not follow with cardiology in [place]. Previously seen by cardiology in [place].     · Referral to cardiology in network  · Continue metoprolol tartrate 50 mg twice a day  · Encouraged to continue monitoring blood pressure and keeping a BP log

## 2023-08-11 NOTE — ASSESSMENT & PLAN NOTE
· History of GERD and hiatal hernia. .  Home regimen includes Prilosec daily. · Reports chronic cough. Described that mainly at night and does express some ongoing reflux.     · Reports having GI provider in [place]    · Increase Prilosec twice daily x 30 days, then continue daily  · Can consider Pepcid if symptoms ongoing

## 2023-08-11 NOTE — ASSESSMENT & PLAN NOTE
· Previously stopped Lipitor given cough. However, symptoms unrelated to Lipitor.   · Recent lipid panel - high TG, LDL, TC    · Restart Lipitor 40 daily  · Continue ezetimibe 10 mg daily

## 2023-09-12 ENCOUNTER — OFFICE VISIT (OUTPATIENT)
Dept: CARDIAC SURGERY | Facility: CLINIC | Age: 63
End: 2023-09-12
Payer: MEDICARE

## 2023-09-12 VITALS
WEIGHT: 192.6 LBS | HEIGHT: 66 IN | TEMPERATURE: 98.4 F | SYSTOLIC BLOOD PRESSURE: 88 MMHG | BODY MASS INDEX: 30.95 KG/M2 | OXYGEN SATURATION: 97 % | HEART RATE: 77 BPM | DIASTOLIC BLOOD PRESSURE: 52 MMHG

## 2023-09-12 DIAGNOSIS — I10 PRIMARY HYPERTENSION: ICD-10-CM

## 2023-09-12 DIAGNOSIS — R06.09 DYSPNEA ON EXERTION: ICD-10-CM

## 2023-09-12 DIAGNOSIS — I47.1 SVT (SUPRAVENTRICULAR TACHYCARDIA): Primary | ICD-10-CM

## 2023-09-12 DIAGNOSIS — R77.8 ELEVATED TROPONIN: ICD-10-CM

## 2023-09-12 PROBLEM — R79.89 ELEVATED TROPONIN: Status: ACTIVE | Noted: 2023-09-12

## 2023-09-12 PROCEDURE — 99205 OFFICE O/P NEW HI 60 MIN: CPT | Performed by: INTERNAL MEDICINE

## 2023-09-12 RX ORDER — METOPROLOL SUCCINATE 50 MG/1
50 TABLET, EXTENDED RELEASE ORAL DAILY
Qty: 90 TABLET | Refills: 3 | Status: SHIPPED | OUTPATIENT
Start: 2023-09-12

## 2023-09-12 RX ORDER — HYDROCHLOROTHIAZIDE 25 MG/1
25 TABLET ORAL DAILY
Qty: 90 TABLET | Refills: 3 | Status: SHIPPED | OUTPATIENT
Start: 2023-09-12

## 2023-09-12 RX ORDER — VALSARTAN 160 MG/1
160 TABLET ORAL DAILY
Qty: 90 TABLET | Refills: 3 | Status: SHIPPED | OUTPATIENT
Start: 2023-09-12

## 2023-09-12 NOTE — PROGRESS NOTES
Cardiology Consultation  Interventional Cardiology and [address] [place] [address] Ave  1960  [phone]  [address] [facility] [address]  [id number] [phone]    1. SVT (supraventricular tachycardia) (HCC)  POCT ECG    CT coronary calcium score    NM myocardial perfusion spect (stress and/or rest)    valsartan (DIOVAN) 160 mg tablet    hydrochlorothiazide (HYDRODIURIL) 25 mg tablet    metoprolol succinate (TOPROL-XL) 50 mg 24 hr tablet      2. Primary hypertension  POCT ECG    CT coronary calcium score    NM myocardial perfusion spect (stress and/or rest)    valsartan (DIOVAN) 160 mg tablet    hydrochlorothiazide (HYDRODIURIL) 25 mg tablet    metoprolol succinate (TOPROL-XL) 50 mg 24 hr tablet      3. Elevated troponin  CT coronary calcium score    NM myocardial perfusion spect (stress and/or rest)    valsartan (DIOVAN) 160 mg tablet    hydrochlorothiazide (HYDRODIURIL) 25 mg tablet    metoprolol succinate (TOPROL-XL) 50 mg 24 hr tablet      4. Dyspnea on exertion  CT coronary calcium score    NM myocardial perfusion spect (stress and/or rest)    valsartan (DIOVAN) 160 mg tablet    hydrochlorothiazide (HYDRODIURIL) 25 mg tablet    metoprolol succinate (TOPROL-XL) 50 mg 24 hr tablet             Discussion/Summary      Episode of SVT at [facility] which broke with adenosine, unfortunately do not have ECG available for review but was at 180 bpm.  No prior episodes.   Seems most likely AVNRT  Elev trop with HS Trop 200+ minimally elevated likely from svt also with dyspnea on exertion, new last year  Htn, on two diuretics with propensity elev creat/dehydration  Elev lipids, atorvastatin stated caused cough on zetia    Plan  1) with elev trop and wilson ex  nuclear stress r/o obst cad  2) cor calcium score help guide need for continuing try other statin  3) stop spirinolactone  4) change to valsartan 160mg daily with hctz 25mg, change to toprol xl 50mg  5) get ecg [facility] prove avnrt, discussed any recurrence should become more frequent would suggest ablation. Limit excessive caffeine (was drinking diet soda) and etoh. 6) 60 minutes reviewing records, documentation, >50% counseling direct pt care      History:     75-year-old female consultation today regarding elevated heart rate. Patient was driving her car visiting family and experienced extreme lightheadedness where she felt she was going to pass out. Went to nearest hospital and ultimately a rapid heart rate at 180 bpm.  She received 6 mg of adenosine and per review stated her SVT broke. Her troponin, high-sensitivity, was minimally elevated at 200. She had an echocardiogram which was normal    She was told to follow-up with cardiology when she returned home for which she presents for consultation today. History of hypertension and hyperlipidemia and has been on combination valsartan-hydrochlorothiazide 320/25 with spironolactone 50 and Lopressor 50 mg twice daily. Her metoprolol was increased when she was at [facility] for her SVT to 50 mg twice a day. She atorvastatin 40 mg daily for hyperlipidemia. Patient with elev chol, stopped statin due to cough    Creat 1.8 on admit with svt, on two diuretics for htn and was considered to be dehydrated. Patient Active Problem List   Diagnosis    Primary osteoarthritis of both knees    Vitamin D deficiency    SVT (supraventricular tachycardia) (HCC)    Single major depressive episode, in full remission ([address])    Raynaud's syndrome without gangrene    Primary hypertension    Mixed hyperlipidemia    History of hepatitis B    Hiatal hernia    Hepatic steatosis    Eczema    Allergic rhinitis    Acquired hypothyroidism    Gastroesophageal reflux disease     History reviewed. No pertinent past medical history.   Social History     Socioeconomic History    Marital status: /Civil Union     Spouse name: Not on file    Number of children: Not on file    Years of education: Not on file    Highest education level: Not on file   Occupational History    Not on file   Tobacco Use    Smoking status: Never    Smokeless tobacco: Never   Substance and Sexual Activity    Alcohol use: Yes     Alcohol/week: 7.0 standard drinks of alcohol     Types: 7 Glasses of wine per week    Drug use: Never    Sexual activity: Not on file   Other Topics Concern    Not on file   Social History Narrative    Not on file     Social Determinants of Health     Financial Resource Strain: Not on file   Food Insecurity: Not on file   Transportation Needs: Not on file   Physical Activity: Not on file   Stress: Not on file   Social Connections: Not on file   Intimate Partner Violence: Not on file   Housing Stability: Not on file      History reviewed. No pertinent family history. History reviewed. No pertinent surgical history. Current Outpatient Medications:     Calcium Carb-Cholecalciferol 600-400 MG-UNIT TABS, Take by mouth, Disp: , Rfl:     cyanocobalamin (VITAMIN B-12) 1000 MCG tablet, Take 1,000 mcg by mouth in the morning, Disp: , Rfl:     ezetimibe (ZETIA) 10 mg tablet, Take 10 mg by mouth in the morning, Disp: , Rfl:     folic acid (FOLVITE) 1 mg tablet, Take 1 mg by mouth in the morning, Disp: , Rfl:     hydrochlorothiazide (HYDRODIURIL) 25 mg tablet, Take 1 tablet (25 mg total) by mouth daily, Disp: 90 tablet, Rfl: 3    levothyroxine 75 mcg tablet, Take 1 tablet by mouth in the morning., Disp: , Rfl:     metoprolol succinate (TOPROL-XL) 50 mg 24 hr tablet, Take 1 tablet (50 mg total) by mouth daily, Disp: 90 tablet, Rfl: 3    omeprazole (PriLOSEC) 40 MG capsule, Take 1 capsule (40 mg total) by mouth 2 (two) times a day for 30 days, THEN 1 capsule (40 mg total) daily. , Disp: 90 capsule, Rfl: 0    PARoxetine (PAXIL) 10 mg tablet, TAKE 1 TABLET DAILY, Disp: 90 tablet, Rfl: 4    triamcinolone (KENALOG) 0.1 % cream, , Disp: , Rfl:     valsartan (DIOVAN) 160 mg tablet, Take 1 tablet (160 mg total) by mouth daily, Disp: 90 tablet, Rfl: 3  Allergies   Allergen Reactions    Amlodipine Other (See Comments)     Edema  Edema      Atorvastatin Myalgia     Cough    Sulfa Antibiotics     Penicillins Rash       Social, Family and medication history as listed, reviewed and updated as necessary    Labs:   Lab Results   Component Value Date     10/29/2015    K 4.6 06/16/2022    CL 95 (L) 06/16/2022    CO2 25 06/16/2022    BUN 27 06/16/2022    CREATININE 1.35 (H) 06/16/2022    CREATININE 0.87 10/29/2015    GLUCOSE 86 10/29/2015    CALCIUM 9.1 10/29/2015       Lab Results   Component Value Date    WBC 5.65 10/29/2015    HGB 14.2 10/29/2015    HGB 13.2 08/07/2014    HCT 41.5 10/29/2015    HCT 40.7 08/07/2014     10/29/2015     08/07/2014       Lab Results   Component Value Date    CHOL 297 10/29/2015    CHOL 289 08/07/2014     Lab Results   Component Value Date     10/29/2015     08/07/2014     Lab Results   Component Value Date    LDLCALC 160 (H) 10/29/2015    LDLCALC 166 (H) 08/07/2014     Lab Results   Component Value Date    TRIG 58 10/29/2015    TRIG 37 08/07/2014     No results found for: "LDLDIRECT"    Lab Results   Component Value Date    ALT 28 10/29/2015    ALT 26 08/07/2014    AST 14 10/29/2015    AST 18 08/07/2014    ALKPHOS 69 10/29/2015    ALKPHOS 73 08/07/2014             No results found for: "NTBNP"    No results found for: "HGBA1C"    Imaging: Reviewed in epic      Review of Systems:  14 systems reviewed and negative with exception of the above       PHYSICAL EXAM:        Vitals:    09/12/23 1421   BP: (!) 88/52   Pulse: 77   Temp: 98.4 °F (36.9 °C)   SpO2: 97%     Body mass index is 31.09 kg/m².   Weight (last 2 days)       Date/Time Weight    09/12/23 1421 87.4 (192.6)               Gen: No acute distress  HEENT: anicteric, mucous membranes moist  Neck: supple, no jugular venous distention, or carotid bruit  Heart: regular, normal s1 and s2, no murmur/rub or gallop  Lungs :clear to auscultation bilaterally, no rales/rhonchi or wheeze  Abdomen: soft nontender, normoactive bowel sounds, no organomegaly  Ext: warm and perfused, normal femoral pulses, no edema, or clubbing  Skin: warm, no rashes  Neuro: AAO x 3, no focal findings  Psychiatric: normal affect  Musculoskeletal: no obvious joint deformities. This note was completed in part utilizing direct voice recognition software. Grammatical errors, random word insertion, spelling mistakes, and incomplete sentences may be an occasional consequence of the system secondary to software limitations, ambient noise and hardware issues. At the time of dictation, efforts were made to edit, clarify and /or correct errors. Please read the chart carefully and recognize, using context, where substitutions have occurred. If you have any questions or concerns about the context, text or information contained within the body of this dictation, please contact myself, the provider, for further clarification.

## 2023-09-13 DIAGNOSIS — F41.9 ANXIETY: ICD-10-CM

## 2023-09-13 DIAGNOSIS — E03.9 ACQUIRED HYPOTHYROIDISM: Primary | ICD-10-CM

## 2023-09-13 PROCEDURE — 93000 ELECTROCARDIOGRAM COMPLETE: CPT | Performed by: INTERNAL MEDICINE

## 2023-09-13 RX ORDER — LEVOTHYROXINE SODIUM 0.07 MG/1
75 TABLET ORAL DAILY
Qty: 90 TABLET | Refills: 3 | Status: SHIPPED | OUTPATIENT
Start: 2023-09-13

## 2023-09-13 RX ORDER — PAROXETINE 10 MG/1
10 TABLET, FILM COATED ORAL DAILY
Qty: 90 TABLET | Refills: 3 | Status: SHIPPED | OUTPATIENT
Start: 2023-09-13

## 2023-09-14 ENCOUNTER — OFFICE VISIT (OUTPATIENT)
Dept: OBGYN CLINIC | Facility: HOSPITAL | Age: 63
End: 2023-09-14
Payer: MEDICARE

## 2023-09-14 VITALS
DIASTOLIC BLOOD PRESSURE: 60 MMHG | HEIGHT: 66 IN | WEIGHT: 192 LBS | HEART RATE: 80 BPM | BODY MASS INDEX: 30.86 KG/M2 | SYSTOLIC BLOOD PRESSURE: 95 MMHG

## 2023-09-14 DIAGNOSIS — G89.29 CHRONIC PAIN OF BOTH KNEES: ICD-10-CM

## 2023-09-14 DIAGNOSIS — Z51.81 ANTICOAGULATION MANAGEMENT ENCOUNTER: ICD-10-CM

## 2023-09-14 DIAGNOSIS — Z79.01 ANTICOAGULATION MANAGEMENT ENCOUNTER: ICD-10-CM

## 2023-09-14 DIAGNOSIS — M17.12 ARTHRITIS OF LEFT KNEE: Primary | ICD-10-CM

## 2023-09-14 DIAGNOSIS — M17.0 PRIMARY OSTEOARTHRITIS OF BOTH KNEES: ICD-10-CM

## 2023-09-14 DIAGNOSIS — M25.562 CHRONIC PAIN OF BOTH KNEES: ICD-10-CM

## 2023-09-14 DIAGNOSIS — M25.561 CHRONIC PAIN OF BOTH KNEES: ICD-10-CM

## 2023-09-14 PROCEDURE — 99215 OFFICE O/P EST HI 40 MIN: CPT | Performed by: ORTHOPAEDIC SURGERY

## 2023-09-14 RX ORDER — CEFAZOLIN SODIUM 2 G/50ML
2000 SOLUTION INTRAVENOUS ONCE
OUTPATIENT
Start: 2023-09-14 | End: 2023-09-14

## 2023-09-14 RX ORDER — SODIUM CHLORIDE, SODIUM LACTATE, POTASSIUM CHLORIDE, CALCIUM CHLORIDE 600; 310; 30; 20 MG/100ML; MG/100ML; MG/100ML; MG/100ML
125 INJECTION, SOLUTION INTRAVENOUS CONTINUOUS
OUTPATIENT
Start: 2023-09-14

## 2023-09-14 RX ORDER — ENOXAPARIN SODIUM 100 MG/ML
40 INJECTION SUBCUTANEOUS
Qty: 11.2 ML | Refills: 0 | Status: SHIPPED | OUTPATIENT
Start: 2023-09-14 | End: 2023-10-12

## 2023-09-14 RX ORDER — CHLORHEXIDINE GLUCONATE ORAL RINSE 1.2 MG/ML
15 SOLUTION DENTAL ONCE
OUTPATIENT
Start: 2023-09-14 | End: 2023-09-14

## 2023-09-14 RX ORDER — ASCORBIC ACID 500 MG
500 TABLET ORAL 2 TIMES DAILY
Qty: 60 TABLET | Refills: 0 | Status: SHIPPED | OUTPATIENT
Start: 2023-09-14 | End: 2023-10-14

## 2023-09-14 RX ORDER — FERROUS SULFATE TAB EC 324 MG (65 MG FE EQUIVALENT) 324 (65 FE) MG
324 TABLET DELAYED RESPONSE ORAL
Qty: 30 TABLET | Refills: 0 | Status: SHIPPED | OUTPATIENT
Start: 2023-09-14 | End: 2023-10-14

## 2023-09-14 RX ORDER — TRANEXAMIC ACID 10 MG/ML
1000 INJECTION, SOLUTION INTRAVENOUS ONCE
OUTPATIENT
Start: 2023-09-14 | End: 2023-09-14

## 2023-09-14 RX ORDER — FOLIC ACID 1 MG/1
1 TABLET ORAL DAILY
Qty: 30 TABLET | Refills: 0 | Status: SHIPPED | OUTPATIENT
Start: 2023-09-14 | End: 2023-09-14

## 2023-09-14 NOTE — PROGRESS NOTES
Subjective;   58-year-old female with established osteoarthritis of both knees. Received injections in June at her previous appointment, right 1 gave her relief ,     the left 1 no symptomatic improvement. Then she also had lubricant injections. She continues to have left greater than right knee pain. Historically the right knee has been a lesser and its length of time, paired to 30+ years for the left. Denies any minimally invasive procedure or arthroscopy of the knees    Originally she felt that surgery on either knee would be October 2024. She now finds that this may be a mobile date of and her lack of improvement on the left knee    History reviewed. No pertinent past medical history. History reviewed. No pertinent surgical history. History reviewed. No pertinent family history. Social History     Tobacco Use   • Smoking status: Never   • Smokeless tobacco: Never   Substance Use Topics   • Alcohol use: Yes     Alcohol/week: 7.0 standard drinks of alcohol     Types: 7 Glasses of wine per week   • Drug use: Never     Exam;    Adult female in no acute distress  ENT; NC/AT  Neck; FROM  Chest; CTA  CVS; RRR  Abdomen; Soft, Nontender  Musculoskeletal;    She has the ability to extend both knees fully despite the radiographic changes that are evident  Has patellofemoral grating that is palpable right greater than left knee, both patellas lateralize through the range of motion arc. She has significantly more discomfort to palpation of the medial and lateral compartment of her left knee than she does the right. X-rays are from June and were previously reviewed with the patient. In review both x-rays show tricompartmental osteoarthritic changes. There is directed to the formal radiologist interpretation on [facility].     Impression;    Significant and advanced bilateral knee osteoarthritis  Left knee osteoarthritis  Left knee pain    Plan;    She is offered and accepts left total knee replacement arthroplasty.   The perioperative period was discussed with the patient by the attending surgeon, and all of her questions answered

## 2023-09-20 ENCOUNTER — HOSPITAL ENCOUNTER (OUTPATIENT)
Dept: RADIOLOGY | Facility: HOSPITAL | Age: 63
Discharge: HOME | End: 2023-09-20
Attending: INTERNAL MEDICINE
Payer: COMMERCIAL

## 2023-09-20 DIAGNOSIS — I10 PRIMARY HYPERTENSION: ICD-10-CM

## 2023-09-20 DIAGNOSIS — I47.10 SVT (SUPRAVENTRICULAR TACHYCARDIA): ICD-10-CM

## 2023-09-20 DIAGNOSIS — R79.89 ELEVATED TROPONIN: ICD-10-CM

## 2023-09-20 DIAGNOSIS — R06.09 DYSPNEA ON EXERTION: ICD-10-CM

## 2023-09-20 PROCEDURE — G1004 CDSM NDSC: HCPCS

## 2023-09-20 PROCEDURE — 75571 CT HRT W/O DYE W/CA TEST: CPT

## 2023-09-21 ENCOUNTER — HOSPITAL ENCOUNTER (OUTPATIENT)
Dept: NON INVASIVE DIAGNOSTICS | Facility: CLINIC | Age: 63
Discharge: HOME | End: 2023-09-21
Payer: MEDICARE

## 2023-09-21 ENCOUNTER — APPOINTMENT (OUTPATIENT)
Dept: NON INVASIVE DIAGNOSTICS | Facility: CLINIC | Age: 63
End: 2023-09-21
Payer: MEDICARE

## 2023-09-21 ENCOUNTER — TELEPHONE (OUTPATIENT)
Dept: CARDIOLOGY CLINIC | Facility: CLINIC | Age: 63
End: 2023-09-21

## 2023-09-21 VITALS
WEIGHT: 190 LBS | BODY MASS INDEX: 30.53 KG/M2 | SYSTOLIC BLOOD PRESSURE: 132 MMHG | OXYGEN SATURATION: 98 % | DIASTOLIC BLOOD PRESSURE: 78 MMHG | HEART RATE: 72 BPM | HEIGHT: 66 IN

## 2023-09-21 DIAGNOSIS — R79.89 ELEVATED TROPONIN: ICD-10-CM

## 2023-09-21 DIAGNOSIS — I10 PRIMARY HYPERTENSION: ICD-10-CM

## 2023-09-21 DIAGNOSIS — R06.09 DYSPNEA ON EXERTION: ICD-10-CM

## 2023-09-21 DIAGNOSIS — I47.10 SVT (SUPRAVENTRICULAR TACHYCARDIA): ICD-10-CM

## 2023-09-21 LAB
ARRHY DURING EX: NORMAL
CHEST PAIN STATEMENT: NORMAL
MAX DIASTOLIC BP: 70 MMHG
MAX HEART RATE: 146 BPM
MAX HR PERCENT: 94 %
MAX HR: 146 BPM
MAX PREDICTED HEART RATE: 155 BPM
MAX. SYSTOLIC BP: 168 MMHG
PROTOCOL NAME: NORMAL
RATE PRESSURE PRODUCT: NORMAL
REASON FOR TERMINATION: NORMAL
SL CV REST NUCLEAR ISOTOPE DOSE: 10.34 MCI
SL CV STRESS NUCLEAR ISOTOPE DOSE: 32 MCI
SL CV STRESS RECOVERY BP: NORMAL MMHG
SL CV STRESS RECOVERY HR: 88 BPM
SL CV STRESS RECOVERY O2 SAT: 98 %
SL CV STRESS STAGE REACHED: 3
STRESS ANGINA INDEX: 0
STRESS BASELINE BP: NORMAL MMHG
STRESS BASELINE HR: 72 BPM
STRESS O2 SAT REST: 98 %
STRESS PEAK HR: 146 BPM
STRESS POST ESTIMATED WORKLOAD: 10.1 METS
STRESS POST EXERCISE DUR MIN: 8 MIN
STRESS POST EXERCISE DUR SEC: 3 SEC
STRESS POST O2 SAT PEAK: 99 %
STRESS POST PEAK BP: 168 MMHG
STRESS/REST PERFUSION RATIO: 0.82
TARGET HR FORMULA: NORMAL
TEST INDICATION: NORMAL
TIME IN EXERCISE PHASE: NORMAL

## 2023-09-21 PROCEDURE — G1004 CDSM NDSC: HCPCS

## 2023-09-21 PROCEDURE — 78452 HT MUSCLE IMAGE SPECT MULT: CPT | Performed by: INTERNAL MEDICINE

## 2023-09-21 PROCEDURE — 93016 CV STRESS TEST SUPVJ ONLY: CPT | Performed by: INTERNAL MEDICINE

## 2023-09-21 PROCEDURE — 78452 HT MUSCLE IMAGE SPECT MULT: CPT

## 2023-09-21 PROCEDURE — A9502 TC99M TETROFOSMIN: HCPCS

## 2023-09-21 PROCEDURE — 93018 CV STRESS TEST I&R ONLY: CPT | Performed by: INTERNAL MEDICINE

## 2023-09-21 PROCEDURE — 93017 CV STRESS TEST TRACING ONLY: CPT

## 2023-09-21 NOTE — TELEPHONE ENCOUNTER
----- Message from Dennis Bro DO sent at 9/21/2023  1:06 PM EDT -----  Please let patient know stress test was normal.

## 2023-09-21 NOTE — TELEPHONE ENCOUNTER
LVM relaying message as given per Dr. Yaima Lopez, and asked if she had any questions she can call us.

## 2023-09-26 ENCOUNTER — TELEPHONE (OUTPATIENT)
Dept: CARDIOLOGY CLINIC | Facility: CLINIC | Age: 63
End: 2023-09-26

## 2023-09-26 NOTE — TELEPHONE ENCOUNTER
----- Message from Angelica Banks DO sent at 9/26/2023 11:43 AM EDT -----  Please call patient and let them know total calcium score was 11. I would continue with zetia at this point. I would suggest being aggressive with trying another statin if score was >100. Thank you.

## 2023-10-03 ENCOUNTER — APPOINTMENT (OUTPATIENT)
Dept: LAB | Facility: CLINIC | Age: 63
End: 2023-10-03
Payer: MEDICARE

## 2023-10-03 DIAGNOSIS — R79.89 ELEVATED TROPONIN: ICD-10-CM

## 2023-10-03 DIAGNOSIS — I10 PRIMARY HYPERTENSION: ICD-10-CM

## 2023-10-03 DIAGNOSIS — I47.10 SVT (SUPRAVENTRICULAR TACHYCARDIA): ICD-10-CM

## 2023-10-03 LAB
ALBUMIN SERPL BCP-MCNC: 4 G/DL (ref 3.5–5)
ALP SERPL-CCNC: 60 U/L (ref 34–104)
ALT SERPL W P-5'-P-CCNC: 24 U/L (ref 7–52)
ANION GAP SERPL CALCULATED.3IONS-SCNC: 8 MMOL/L
AST SERPL W P-5'-P-CCNC: 26 U/L (ref 13–39)
BILIRUB SERPL-MCNC: 0.44 MG/DL (ref 0.2–1)
BUN SERPL-MCNC: 20 MG/DL (ref 5–25)
CALCIUM SERPL-MCNC: 9.8 MG/DL (ref 8.4–10.2)
CHLORIDE SERPL-SCNC: 101 MMOL/L (ref 96–108)
CO2 SERPL-SCNC: 30 MMOL/L (ref 21–32)
CREAT SERPL-MCNC: 1.11 MG/DL (ref 0.6–1.3)
GFR SERPL CREATININE-BSD FRML MDRD: 52 ML/MIN/1.73SQ M
GLUCOSE P FAST SERPL-MCNC: 90 MG/DL (ref 65–99)
POTASSIUM SERPL-SCNC: 4 MMOL/L (ref 3.5–5.3)
PROT SERPL-MCNC: 6.6 G/DL (ref 6.4–8.4)
SODIUM SERPL-SCNC: 139 MMOL/L (ref 135–147)

## 2023-10-03 PROCEDURE — 80053 COMPREHEN METABOLIC PANEL: CPT

## 2023-10-03 PROCEDURE — 36415 COLL VENOUS BLD VENIPUNCTURE: CPT

## 2023-10-04 ENCOUNTER — APPOINTMENT (OUTPATIENT)
Dept: LAB | Facility: CLINIC | Age: 63
End: 2023-10-04
Payer: MEDICARE

## 2023-10-04 ENCOUNTER — TRANSCRIBE ORDERS (OUTPATIENT)
Dept: LAB | Facility: CLINIC | Age: 63
End: 2023-10-04

## 2023-10-04 DIAGNOSIS — E04.9 ENLARGEMENT OF THYROID: ICD-10-CM

## 2023-10-04 DIAGNOSIS — E26.1: ICD-10-CM

## 2023-10-04 DIAGNOSIS — E34.9 ENDOCRINE PROBLEM: ICD-10-CM

## 2023-10-04 DIAGNOSIS — E03.9 HYPOTHYROIDISM, ADULT: ICD-10-CM

## 2023-10-04 DIAGNOSIS — I10 ESSENTIAL HYPERTENSION: ICD-10-CM

## 2023-10-04 DIAGNOSIS — I10 ESSENTIAL HYPERTENSION: Primary | ICD-10-CM

## 2023-10-04 LAB
ALBUMIN SERPL BCP-MCNC: 4 G/DL (ref 3.5–5)
ALP SERPL-CCNC: 61 U/L (ref 34–104)
ALT SERPL W P-5'-P-CCNC: 30 U/L (ref 7–52)
ANION GAP SERPL CALCULATED.3IONS-SCNC: 10 MMOL/L
AST SERPL W P-5'-P-CCNC: 29 U/L (ref 13–39)
BACTERIA UR QL AUTO: ABNORMAL /HPF
BASOPHILS # BLD AUTO: 0.05 THOUSANDS/ÂΜL (ref 0–0.1)
BASOPHILS NFR BLD AUTO: 1 % (ref 0–1)
BILIRUB SERPL-MCNC: 0.43 MG/DL (ref 0.2–1)
BILIRUB UR QL STRIP: NEGATIVE
BUN SERPL-MCNC: 19 MG/DL (ref 5–25)
CALCIUM SERPL-MCNC: 9.6 MG/DL (ref 8.4–10.2)
CEA SERPL-MCNC: 2.2 NG/ML (ref 0–3)
CHLORIDE SERPL-SCNC: 100 MMOL/L (ref 96–108)
CLARITY UR: ABNORMAL
CO2 SERPL-SCNC: 29 MMOL/L (ref 21–32)
COLOR UR: YELLOW
CORTIS AM PEAK SERPL-MCNC: 17.6 UG/DL (ref 6.7–22.6)
CREAT SERPL-MCNC: 1.07 MG/DL (ref 0.6–1.3)
CREAT UR-MCNC: 243.9 MG/DL
EOSINOPHIL # BLD AUTO: 0.16 THOUSAND/ÂΜL (ref 0–0.61)
EOSINOPHIL NFR BLD AUTO: 2 % (ref 0–6)
ERYTHROCYTE [DISTWIDTH] IN BLOOD BY AUTOMATED COUNT: 13.2 % (ref 11.6–15.1)
EST. AVERAGE GLUCOSE BLD GHB EST-MCNC: 120 MG/DL
FERRITIN SERPL-MCNC: 61 NG/ML (ref 11–307)
GFR SERPL CREATININE-BSD FRML MDRD: 54 ML/MIN/1.73SQ M
GLUCOSE P FAST SERPL-MCNC: 91 MG/DL (ref 65–99)
GLUCOSE UR STRIP-MCNC: NEGATIVE MG/DL
HBA1C MFR BLD: 5.8 %
HCT VFR BLD AUTO: 38.6 % (ref 34.8–46.1)
HGB BLD-MCNC: 12.9 G/DL (ref 11.5–15.4)
HGB UR QL STRIP.AUTO: NEGATIVE
IMM GRANULOCYTES # BLD AUTO: 0.02 THOUSAND/UL (ref 0–0.2)
IMM GRANULOCYTES NFR BLD AUTO: 0 % (ref 0–2)
IRON SATN MFR SERPL: 21 % (ref 15–50)
IRON SERPL-MCNC: 70 UG/DL (ref 50–212)
KETONES UR STRIP-MCNC: NEGATIVE MG/DL
LEUKOCYTE ESTERASE UR QL STRIP: ABNORMAL
LYMPHOCYTES # BLD AUTO: 1.28 THOUSANDS/ÂΜL (ref 0.6–4.47)
LYMPHOCYTES NFR BLD AUTO: 19 % (ref 14–44)
MAGNESIUM SERPL-MCNC: 1.8 MG/DL (ref 1.9–2.7)
MCH RBC QN AUTO: 35 PG (ref 26.8–34.3)
MCHC RBC AUTO-ENTMCNC: 33.4 G/DL (ref 31.4–37.4)
MCV RBC AUTO: 105 FL (ref 82–98)
MICROALBUMIN UR-MCNC: 9.8 MG/L
MICROALBUMIN/CREAT 24H UR: 4 MG/G CREATININE (ref 0–30)
MONOCYTES # BLD AUTO: 0.57 THOUSAND/ÂΜL (ref 0.17–1.22)
MONOCYTES NFR BLD AUTO: 9 % (ref 4–12)
MUCOUS THREADS UR QL AUTO: ABNORMAL
NEUTROPHILS # BLD AUTO: 4.61 THOUSANDS/ÂΜL (ref 1.85–7.62)
NEUTS SEG NFR BLD AUTO: 69 % (ref 43–75)
NITRITE UR QL STRIP: NEGATIVE
NON-SQ EPI CELLS URNS QL MICRO: ABNORMAL /HPF
NRBC BLD AUTO-RTO: 0 /100 WBCS
PH UR STRIP.AUTO: 6 [PH]
PHOSPHATE SERPL-MCNC: 3.2 MG/DL (ref 2.3–4.1)
PLATELET # BLD AUTO: 307 THOUSANDS/UL (ref 149–390)
PMV BLD AUTO: 9.6 FL (ref 8.9–12.7)
POTASSIUM SERPL-SCNC: 3.8 MMOL/L (ref 3.5–5.3)
PROT SERPL-MCNC: 6.7 G/DL (ref 6.4–8.4)
PROT UR STRIP-MCNC: ABNORMAL MG/DL
RBC # BLD AUTO: 3.69 MILLION/UL (ref 3.81–5.12)
RBC #/AREA URNS AUTO: ABNORMAL /HPF
SODIUM SERPL-SCNC: 139 MMOL/L (ref 135–147)
SP GR UR STRIP.AUTO: >=1.03 (ref 1–1.03)
T4 FREE SERPL-MCNC: 0.79 NG/DL (ref 0.61–1.12)
TIBC SERPL-MCNC: 332 UG/DL (ref 250–450)
TSH SERPL DL<=0.05 MIU/L-ACNC: 2.1 UIU/ML (ref 0.45–4.5)
UIBC SERPL-MCNC: 262 UG/DL (ref 155–355)
UROBILINOGEN UR STRIP-ACNC: <2 MG/DL
WBC # BLD AUTO: 6.69 THOUSAND/UL (ref 4.31–10.16)
WBC #/AREA URNS AUTO: ABNORMAL /HPF

## 2023-10-04 PROCEDURE — 83540 ASSAY OF IRON: CPT

## 2023-10-04 PROCEDURE — 84260 ASSAY OF SEROTONIN: CPT

## 2023-10-04 PROCEDURE — 84100 ASSAY OF PHOSPHORUS: CPT

## 2023-10-04 PROCEDURE — 83835 ASSAY OF METANEPHRINES: CPT

## 2023-10-04 PROCEDURE — 84445 ASSAY OF TSI GLOBULIN: CPT

## 2023-10-04 PROCEDURE — 84443 ASSAY THYROID STIM HORMONE: CPT

## 2023-10-04 PROCEDURE — 86800 THYROGLOBULIN ANTIBODY: CPT

## 2023-10-04 PROCEDURE — 82626 DEHYDROEPIANDROSTERONE: CPT

## 2023-10-04 PROCEDURE — 80053 COMPREHEN METABOLIC PANEL: CPT

## 2023-10-04 PROCEDURE — 85025 COMPLETE CBC W/AUTO DIFF WBC: CPT

## 2023-10-04 PROCEDURE — 82533 TOTAL CORTISOL: CPT

## 2023-10-04 PROCEDURE — 82570 ASSAY OF URINE CREATININE: CPT

## 2023-10-04 PROCEDURE — 81001 URINALYSIS AUTO W/SCOPE: CPT

## 2023-10-04 PROCEDURE — 82043 UR ALBUMIN QUANTITATIVE: CPT

## 2023-10-04 PROCEDURE — 83550 IRON BINDING TEST: CPT

## 2023-10-04 PROCEDURE — 83735 ASSAY OF MAGNESIUM: CPT

## 2023-10-04 PROCEDURE — 84439 ASSAY OF FREE THYROXINE: CPT

## 2023-10-04 PROCEDURE — 84244 ASSAY OF RENIN: CPT

## 2023-10-04 PROCEDURE — 86376 MICROSOMAL ANTIBODY EACH: CPT

## 2023-10-04 PROCEDURE — 82378 CARCINOEMBRYONIC ANTIGEN: CPT

## 2023-10-04 PROCEDURE — 82308 ASSAY OF CALCITONIN: CPT

## 2023-10-04 PROCEDURE — 36415 COLL VENOUS BLD VENIPUNCTURE: CPT

## 2023-10-04 PROCEDURE — 82728 ASSAY OF FERRITIN: CPT

## 2023-10-04 PROCEDURE — 82088 ASSAY OF ALDOSTERONE: CPT

## 2023-10-04 PROCEDURE — 83036 HEMOGLOBIN GLYCOSYLATED A1C: CPT

## 2023-10-04 PROCEDURE — 82024 ASSAY OF ACTH: CPT

## 2023-10-05 LAB
ACTH PLAS-MCNC: 47.2 PG/ML (ref 7.2–63.3)
THYROGLOB AB SERPL-ACNC: <1 IU/ML (ref 0–0.9)
THYROPEROXIDASE AB SERPL-ACNC: 129 IU/ML (ref 0–34)

## 2023-10-06 ENCOUNTER — HOSPITAL ENCOUNTER (OUTPATIENT)
Dept: RADIOLOGY | Facility: HOSPITAL | Age: 63
Discharge: HOME | End: 2023-10-06
Attending: SPECIALIST
Payer: MEDICARE

## 2023-10-06 DIAGNOSIS — E34.9 ENDOCRINE DISORDER, UNSPECIFIED: ICD-10-CM

## 2023-10-06 DIAGNOSIS — E03.9 HYPOTHYROIDISM, UNSPECIFIED: ICD-10-CM

## 2023-10-06 DIAGNOSIS — E26.1 SECONDARY HYPERALDOSTERONISM (HCC): ICD-10-CM

## 2023-10-06 DIAGNOSIS — E04.9 NONTOXIC GOITER, UNSPECIFIED: ICD-10-CM

## 2023-10-06 LAB
CALCIT SERPL-MCNC: <2 PG/ML (ref 0–5)
TSI SER-ACNC: 0.13 IU/L (ref 0–0.55)

## 2023-10-06 PROCEDURE — 76536 US EXAM OF HEAD AND NECK: CPT

## 2023-10-07 LAB
RENIN PLAS-CCNC: 3.5 NG/ML/HR (ref 0.17–5.38)
SEROTONIN PLAS-MCNC: <5 NG/ML (ref 8–217)

## 2023-10-08 LAB
ALDOST SERPL-MCNC: 5.5 NG/DL (ref 0–30)
DHEA SERPL-MCNC: 179 NG/DL (ref 21–402)
METANEPH FREE SERPL-MCNC: 13.1 PG/ML (ref 0–88)
NORMETANEPHRINE SERPL-MCNC: 80.4 PG/ML (ref 0–285.2)

## 2024-01-22 DIAGNOSIS — F41.9 ANXIETY: ICD-10-CM

## 2024-01-22 RX ORDER — PAROXETINE 10 MG/1
10 TABLET, FILM COATED ORAL DAILY
Qty: 90 TABLET | Refills: 0 | Status: SHIPPED | OUTPATIENT
Start: 2024-01-22

## 2024-01-24 ENCOUNTER — TELEPHONE (OUTPATIENT)
Dept: OBGYN CLINIC | Facility: HOSPITAL | Age: 64
End: 2024-01-24

## 2024-01-30 NOTE — PRE-PROCEDURE INSTRUCTIONS
Pre-Surgery Instructions:   Medication Instructions    ascorbic acid (VITAMIN C) 500 mg tablet Do not take day of surgery; continue as prescribed excluding DOS     Calcium Carb-Cholecalciferol 600-400 MG-UNIT TABS Avoid 1 week prior to surgery      cyanocobalamin (VITAMIN B-12) 1000 MCG tablet Avoid 1 week prior to surgery      ezetimibe (ZETIA) 10 mg tablet Take Day of Surgery; unless usually taken at night     ferrous sulfate 324 (65 Fe) mg Do not take day of surgery; continue as prescribed excluding DOS     folic acid (FOLVITE) 1 mg tablet Do not take day of surgery; continue as prescribed excluding DOS     hydrochlorothiazide (HYDRODIURIL) 25 mg tablet Do not take day of surgery; continue as prescribed excluding DOS     levothyroxine 75 mcg tablet Take Day of Surgery; unless usually taken at night     metoprolol succinate (TOPROL-XL) 50 mg 24 hr tablet Take Day of Surgery; unless usually taken at night     omeprazole (PriLOSEC) 40 MG capsule Take Day of Surgery; unless usually taken at night     PARoxetine (PAXIL) 10 mg tablet Take Day of Surgery; unless usually taken at night     triamcinolone (KENALOG) 0.1 % cream Do not take day of surgery; continue as prescribed excluding DOS     valsartan (DIOVAN) 160 mg tablet Do not take day of surgery; continue as prescribed excluding DOS     Medication instructions for day surgery reviewed. Please use only a sip of water to take your instructed medications. Avoid all over the counter vitamins, supplements and NSAIDS for one week prior to surgery per anesthesia guidelines. Tylenol is ok to take as needed.     You will receive a call one business day prior to surgery with an arrival time and hospital directions. If your surgery is scheduled on a Monday, the hospital will be calling you on the Friday prior to your surgery. If you have not heard from anyone by 8pm, please call the hospital supervisor through the hospital  at [phone]. (Cam  [phone]).    Do not eat or drink anything after midnight the night before your surgery, including candy, mints, lifesavers, or chewing gum. Do not drink alcohol 24hrs before your surgery. Try not to smoke at least 24hrs before your surgery.       Follow the pre surgery showering instructions as listed in the “My Surgical Experience Booklet” or otherwise provided by your surgeon's office. Do not use a blade to shave the surgical area 1 week before surgery. It is okay to use a clean electric clippers up to 24 hours before surgery. Do not apply any lotions, creams, including makeup, cologne, deodorant, or perfumes after showering on the day of your surgery. Do not use dry shampoo, hair spray, hair gel, or any type of hair products.     No contact lenses, eye make-up, or artificial eyelashes. Remove nail polish, including gel polish, and any artificial, gel, or acrylic nails if possible. Remove all jewelry including rings and body piercing jewelry.     Wear causal clothing that is easy to take on and off. Consider your type of surgery.    Keep any valuables, jewelry, piercings at home. Please bring any specially ordered equipment (sling, braces) if indicated.    Arrange for a responsible person to drive you to and from the hospital on the day of your surgery. Visitor Guidelines discussed.     Call the surgeon's office with any new illnesses, exposures, or additional questions prior to surgery.    Please reference your “My Surgical Experience Booklet” for additional information to prepare for your upcoming surgery.

## 2024-01-31 ENCOUNTER — RA CDI HCC (OUTPATIENT)
Dept: OTHER | Facility: HOSPITAL | Age: 64
End: 2024-01-31

## 2024-02-07 ENCOUNTER — EVALUATION (OUTPATIENT)
Dept: PHYSICAL THERAPY | Facility: CLINIC | Age: 64
End: 2024-02-07
Payer: MEDICARE

## 2024-02-07 ENCOUNTER — CONSULT (OUTPATIENT)
Dept: INTERNAL MEDICINE CLINIC | Facility: CLINIC | Age: 64
End: 2024-02-07
Payer: MEDICARE

## 2024-02-07 ENCOUNTER — TRANSCRIBE ORDERS (OUTPATIENT)
Dept: LAB | Facility: CLINIC | Age: 64
End: 2024-02-07

## 2024-02-07 ENCOUNTER — APPOINTMENT (OUTPATIENT)
Dept: LAB | Facility: CLINIC | Age: 64
End: 2024-02-07
Payer: MEDICARE

## 2024-02-07 VITALS
WEIGHT: 194 LBS | DIASTOLIC BLOOD PRESSURE: 72 MMHG | TEMPERATURE: 98.1 F | HEIGHT: 66 IN | SYSTOLIC BLOOD PRESSURE: 112 MMHG | HEART RATE: 84 BPM | OXYGEN SATURATION: 98 % | BODY MASS INDEX: 31.18 KG/M2

## 2024-02-07 DIAGNOSIS — M17.12 ARTHRITIS OF LEFT KNEE: ICD-10-CM

## 2024-02-07 DIAGNOSIS — I10 PRIMARY HYPERTENSION: ICD-10-CM

## 2024-02-07 DIAGNOSIS — M25.562 CHRONIC PAIN OF BOTH KNEES: ICD-10-CM

## 2024-02-07 DIAGNOSIS — M25.561 CHRONIC PAIN OF BOTH KNEES: Primary | ICD-10-CM

## 2024-02-07 DIAGNOSIS — M17.12 PRIMARY OSTEOARTHRITIS OF LEFT KNEE: ICD-10-CM

## 2024-02-07 DIAGNOSIS — M25.561 CHRONIC PAIN OF BOTH KNEES: ICD-10-CM

## 2024-02-07 DIAGNOSIS — M17.12 ARTHRITIS OF LEFT KNEE: Primary | ICD-10-CM

## 2024-02-07 DIAGNOSIS — K21.9 GASTROESOPHAGEAL REFLUX DISEASE, UNSPECIFIED WHETHER ESOPHAGITIS PRESENT: ICD-10-CM

## 2024-02-07 DIAGNOSIS — Z01.818 PREOPERATIVE EXAMINATION: Primary | ICD-10-CM

## 2024-02-07 DIAGNOSIS — G89.29 CHRONIC PAIN OF BOTH KNEES: ICD-10-CM

## 2024-02-07 DIAGNOSIS — M25.562 CHRONIC PAIN OF BOTH KNEES: Primary | ICD-10-CM

## 2024-02-07 DIAGNOSIS — I47.10 SVT (SUPRAVENTRICULAR TACHYCARDIA): ICD-10-CM

## 2024-02-07 DIAGNOSIS — E03.9 ACQUIRED HYPOTHYROIDISM: ICD-10-CM

## 2024-02-07 DIAGNOSIS — G89.29 CHRONIC PAIN OF BOTH KNEES: Primary | ICD-10-CM

## 2024-02-07 PROBLEM — R79.89 ELEVATED TROPONIN: Status: RESOLVED | Noted: 2023-09-12 | Resolved: 2024-02-07

## 2024-02-07 PROBLEM — R71.8 ELEVATED MCV: Status: ACTIVE | Noted: 2024-02-07

## 2024-02-07 PROBLEM — R06.09 DYSPNEA ON EXERTION: Status: RESOLVED | Noted: 2023-09-12 | Resolved: 2024-02-07

## 2024-02-07 LAB
ABO GROUP BLD: NORMAL
ALBUMIN SERPL BCP-MCNC: 4.2 G/DL (ref 3.5–5)
ALP SERPL-CCNC: 76 U/L (ref 34–104)
ALT SERPL W P-5'-P-CCNC: 22 U/L (ref 7–52)
ANION GAP SERPL CALCULATED.3IONS-SCNC: 13 MMOL/L
APTT PPP: 24 SECONDS (ref 23–37)
AST SERPL W P-5'-P-CCNC: 22 U/L (ref 13–39)
BASOPHILS # BLD AUTO: 0.05 THOUSANDS/ÂΜL (ref 0–0.1)
BASOPHILS NFR BLD AUTO: 1 % (ref 0–1)
BILIRUB SERPL-MCNC: 0.51 MG/DL (ref 0.2–1)
BLD GP AB SCN SERPL QL: NEGATIVE
BUN SERPL-MCNC: 20 MG/DL (ref 5–25)
CALCIUM SERPL-MCNC: 9.6 MG/DL (ref 8.4–10.2)
CHLORIDE SERPL-SCNC: 99 MMOL/L (ref 96–108)
CO2 SERPL-SCNC: 28 MMOL/L (ref 21–32)
CREAT SERPL-MCNC: 1.03 MG/DL (ref 0.6–1.3)
EOSINOPHIL # BLD AUTO: 0.22 THOUSAND/ÂΜL (ref 0–0.61)
EOSINOPHIL NFR BLD AUTO: 4 % (ref 0–6)
ERYTHROCYTE [DISTWIDTH] IN BLOOD BY AUTOMATED COUNT: 12.9 % (ref 11.6–15.1)
EST. AVERAGE GLUCOSE BLD GHB EST-MCNC: 111 MG/DL
GFR SERPL CREATININE-BSD FRML MDRD: 57 ML/MIN/1.73SQ M
GLUCOSE P FAST SERPL-MCNC: 82 MG/DL (ref 65–99)
HBA1C MFR BLD: 5.5 %
HCT VFR BLD AUTO: 40.5 % (ref 34.8–46.1)
HGB BLD-MCNC: 13.7 G/DL (ref 11.5–15.4)
IMM GRANULOCYTES # BLD AUTO: 0.02 THOUSAND/UL (ref 0–0.2)
IMM GRANULOCYTES NFR BLD AUTO: 0 % (ref 0–2)
INR PPP: 0.96 (ref 0.84–1.19)
LYMPHOCYTES # BLD AUTO: 1.77 THOUSANDS/ÂΜL (ref 0.6–4.47)
LYMPHOCYTES NFR BLD AUTO: 31 % (ref 14–44)
MCH RBC QN AUTO: 34.9 PG (ref 26.8–34.3)
MCHC RBC AUTO-ENTMCNC: 33.8 G/DL (ref 31.4–37.4)
MCV RBC AUTO: 103 FL (ref 82–98)
MONOCYTES # BLD AUTO: 0.46 THOUSAND/ÂΜL (ref 0.17–1.22)
MONOCYTES NFR BLD AUTO: 8 % (ref 4–12)
NEUTROPHILS # BLD AUTO: 3.18 THOUSANDS/ÂΜL (ref 1.85–7.62)
NEUTS SEG NFR BLD AUTO: 56 % (ref 43–75)
NRBC BLD AUTO-RTO: 0 /100 WBCS
PLATELET # BLD AUTO: 312 THOUSANDS/UL (ref 149–390)
PMV BLD AUTO: 10 FL (ref 8.9–12.7)
POTASSIUM SERPL-SCNC: 3.7 MMOL/L (ref 3.5–5.3)
PROT SERPL-MCNC: 7.1 G/DL (ref 6.4–8.4)
PROTHROMBIN TIME: 12.7 SECONDS (ref 11.6–14.5)
RBC # BLD AUTO: 3.93 MILLION/UL (ref 3.81–5.12)
RH BLD: POSITIVE
SODIUM SERPL-SCNC: 140 MMOL/L (ref 135–147)
SPECIMEN EXPIRATION DATE: NORMAL
WBC # BLD AUTO: 5.7 THOUSAND/UL (ref 4.31–10.16)

## 2024-02-07 PROCEDURE — 83036 HEMOGLOBIN GLYCOSYLATED A1C: CPT

## 2024-02-07 PROCEDURE — 36415 COLL VENOUS BLD VENIPUNCTURE: CPT

## 2024-02-07 PROCEDURE — 85025 COMPLETE CBC W/AUTO DIFF WBC: CPT

## 2024-02-07 PROCEDURE — 97161 PT EVAL LOW COMPLEX 20 MIN: CPT | Performed by: PHYSICAL THERAPIST

## 2024-02-07 PROCEDURE — 85610 PROTHROMBIN TIME: CPT

## 2024-02-07 PROCEDURE — 86850 RBC ANTIBODY SCREEN: CPT

## 2024-02-07 PROCEDURE — 80053 COMPREHEN METABOLIC PANEL: CPT

## 2024-02-07 PROCEDURE — 85730 THROMBOPLASTIN TIME PARTIAL: CPT

## 2024-02-07 PROCEDURE — 86901 BLOOD TYPING SEROLOGIC RH(D): CPT

## 2024-02-07 PROCEDURE — 86900 BLOOD TYPING SEROLOGIC ABO: CPT

## 2024-02-07 PROCEDURE — 99214 OFFICE O/P EST MOD 30 MIN: CPT | Performed by: INTERNAL MEDICINE

## 2024-02-07 RX ORDER — FERROUS SULFATE 325(65) MG
325 TABLET ORAL
COMMUNITY

## 2024-02-07 NOTE — PROGRESS NOTES
PT Evaluation     Today's date: 2024  Patient name: Delia Morin  : 1960  MRN: [id number]  Referring provider: Jacob Yao PA-C  Dx:   Encounter Diagnosis     ICD-10-CM    1. Arthritis of left knee  M17.12 Ambulatory referral to Physical Therapy      2. Chronic pain of both knees  M25.561 Ambulatory referral to Physical Therapy    M25.562     G89.29                      Assessment  Assessment details: Pt is a 63 y.o. female who presents to outpatient PT pre-op TKA with pain, decreased rom, decreased strength and decreased functional mobility. She will benefit from skilled PT to address these deficits in order to achieve her goals and maximize her functional mobility.  She has been provided with an hep to address her current rom and strength deficits and is scheduled for her first post-op apt.  Thank you for this referral.          Plan  Planned therapy interventions: manual therapy, massage, strengthening, stretching, therapeutic activities, therapeutic exercise, therapeutic training, transfer training, gait training, home exercise program and IADL retraining  Frequency: 2x week  Duration in weeks: 8        Subjective Evaluation    History of Present Illness  Mechanism of injury: 24 is scheduled for L TKA with Dr. Leblanc.  Pt reports that she has been having B/L knee pain for years. Reports that she has been undergoing injection but the last series was ineffective for her L knee.  Denies pain at night. Reports that she has pain when standing for extended periods of time and has sig pain with stair climbing.    Reports that she would like to return playing golf 3-4x's/week and working out with her .    Patient Goals  Patient goals for therapy: decreased edema, decreased pain, increased motion, increased strength, independence with ADLs/IADLs and return to sport/leisure activities    Pain  Current pain ratin  At worst pain ratin          Objective  L knee  Pre-op  Tender to  Palpation:     ROM   Flexion: 122   Extension: -3    Strength:   Flexion:  4/5   Extension:  4/5    Min edema noted  Decreased quad set compared to right but able to perform SLR with no lag  Poor eccnetric quad control noted with step down              Precautions: B/L Knee OA, L TKA 2/28/24, HTN      Manuals             prom                                                    Neuro Re-Ed                                                                                                        Ther Ex             Heels slides             saq             Slr flexion             Prone TKE             Step up             Mini squats             Cone taps                          Ther Activity             bike                          Gait Training                                       Modalities             Cp with extension hang

## 2024-02-07 NOTE — ASSESSMENT & PLAN NOTE
Compliant with Thyroid medication  We discussed proper intake of medication - take 30 min prior to other medications and food. Avoid intake with iron and can take after having breakfast.   TSH and FT4 from Oct/2023 normal   Cont levothyroxine 75 mcg daily  Cont to follow with Endocrinology

## 2024-02-07 NOTE — ASSESSMENT & PLAN NOTE
On Feb/2024 - Total L knee arthroplasty for OA   Had preop labs this morning, pending results   Cleared for surgery from medical standpoint -pending preadmission labs

## 2024-02-07 NOTE — PROGRESS NOTES
Assessment and Plan:    1. Preoperative examination    2. Primary osteoarthritis of left knee  Assessment & Plan:  On Feb/2024 - Total L knee arthroplasty for OA   Had preop labs this morning, pending results   Cleared for surgery from medical standpoint -pending preadmission labs      3. Primary hypertension  Assessment & Plan:  Today Blood Pressure: 112/72  Compliant with antihypertensives, continue HZTC and valsartan       4. SVT (supraventricular tachycardia)  Assessment & Plan:  Hx of SVT  Follows with Cardiology   Had Cardiac workup in Sept/2023  On exam, HR 85, RRR, without additional findings   Continue with Toprol-XL       5. Acquired hypothyroidism  Assessment & Plan:  Compliant with Thyroid medication  We discussed proper intake of medication - take 30 min prior to other medications and food. Avoid intake with iron and can take after having breakfast.   TSH and FT4 from Oct/2023 normal   Cont levothyroxine 75 mcg daily  Cont to follow with Endocrinology       6. Gastroesophageal reflux disease, unspecified whether esophagitis present  Assessment & Plan:  Reports her reflux symptoms are controlled   Continue with PPI twice a day           HPI:     Delia Morin is  63 y.o. female with history of OA of bilateral knees, HTN, SVT, acquired hypothyroidism, and GERD that arrives to clinic for preoperative evaluation cardiology.     Patient reports that she currently has no symptoms and no complaints.  Reports she is feeling well and looking forward to surgery.  Reports after her L knee surgery she will have her right knee operated in the fall. Only surgery is a surgery when she was 3 years old for her tonsils and denies any adverse effects with anesthesia.     Reports the twice a day PPI is helping with her reflux symptoms.  Patient reports frequent sun exposure and uses sunscreen.     Patient has no other complaints at this time.      Subjective:    Review of Systems   Constitutional:  Negative for chills,  diaphoresis, fatigue and fever.   HENT:  Negative for ear pain and sore throat.    Eyes:  Negative for pain and visual disturbance.   Respiratory:  Negative for cough and shortness of breath.    Cardiovascular:  Negative for chest pain, palpitations and leg swelling.   Gastrointestinal:  Negative for abdominal pain, diarrhea, nausea and vomiting.   Genitourinary:  Negative for dysuria and hematuria.   Musculoskeletal:  Negative for arthralgias and back pain.   Skin:  Negative for color change and rash.   Neurological:  Negative for seizures, syncope, light-headedness and headaches.   All other systems reviewed and are negative.       Patient Active Problem List   Diagnosis   • Primary osteoarthritis of both knees   • Vitamin D deficiency   • SVT (supraventricular tachycardia)   • Single major depressive episode, in full remission (HCC)   • Raynaud's syndrome without gangrene   • Primary hypertension   • Mixed hyperlipidemia   • History of hepatitis B   • Hiatal hernia   • Hepatic steatosis   • Allergic rhinitis   • Acquired hypothyroidism   • Gastroesophageal reflux disease   • Elevated MCV   • Primary osteoarthritis of left knee        Current Outpatient Medications   Medication Sig Dispense Refill   • Calcium Carb-Cholecalciferol 600-400 MG-UNIT TABS Take by mouth     • cyanocobalamin (VITAMIN B-12) 1000 MCG tablet Take 1,000 mcg by mouth in the morning     • ezetimibe (ZETIA) 10 mg tablet Take 10 mg by mouth in the morning     • ferrous sulfate 325 (65 Fe) mg tablet Take 325 mg by mouth daily with breakfast     • folic acid (FOLVITE) 1 mg tablet Take 1 mg by mouth in the morning     • hydrochlorothiazide (HYDRODIURIL) 25 mg tablet Take 1 tablet (25 mg total) by mouth daily 90 tablet 3   • levothyroxine 75 mcg tablet Take 1 tablet (75 mcg total) by mouth daily 90 tablet 3   • metoprolol succinate (TOPROL-XL) 50 mg 24 hr tablet Take 1 tablet (50 mg total) by mouth daily 90 tablet 3   • PARoxetine (PAXIL) 10 mg  "tablet Take 1 tablet (10 mg total) by mouth daily 90 tablet 0   • triamcinolone (KENALOG) 0.1 % cream      • valsartan (DIOVAN) 160 mg tablet Take 1 tablet (160 mg total) by mouth daily 90 tablet 3   • ascorbic acid (VITAMIN C) 500 mg tablet Take 1 tablet (500 mg total) by mouth 2 (two) times a day for 60 doses 60 tablet 0   • omeprazole (PriLOSEC) 40 MG capsule Take 1 capsule (40 mg total) by mouth 2 (two) times a day for 30 days, THEN 1 capsule (40 mg total) daily. 90 capsule 3     No current facility-administered medications for this visit.        Allergies   Allergen Reactions   • Amlodipine Swelling and Other (See Comments)     Ankle swelling     • Atorvastatin Cough and Myalgia     Cough   • Penicillins Rash   • Sulfa Antibiotics Rash        The following portions of the patient's history were reviewed and updated as appropriate: allergies, current medications, past family history, past medical history, past social history, past surgical history and problem list.             Objective:    /72   Pulse 84   Temp 98.1 °F (36.7 °C)   Ht 5' 6\" (1.676 m)   Wt 88 kg (194 lb)   SpO2 98%   BMI 31.31 kg/m²      Body mass index is 31.31 kg/m².     Physical Exam  Vitals reviewed.   Constitutional:       Appearance: Normal appearance. She is normal weight. She is not ill-appearing or diaphoretic.   HENT:      Head: Normocephalic and atraumatic.      Mouth/Throat:      Mouth: Mucous membranes are moist.      Pharynx: Oropharynx is clear.   Eyes:      General: No scleral icterus.     Conjunctiva/sclera: Conjunctivae normal.   Cardiovascular:      Rate and Rhythm: Normal rate and regular rhythm.      Pulses: Normal pulses.      Heart sounds: Normal heart sounds. No murmur heard.     No gallop.   Pulmonary:      Effort: Pulmonary effort is normal. No respiratory distress.      Breath sounds: Normal breath sounds. No wheezing or rales.   Abdominal:      General: Bowel sounds are normal. There is no distension.      " Palpations: Abdomen is soft. There is no mass.      Tenderness: There is no abdominal tenderness.   Musculoskeletal:         General: Normal range of motion.      Cervical back: Normal range of motion and neck supple.      Right lower leg: No edema.      Left lower leg: No edema.   Skin:     General: Skin is warm and dry.      Capillary Refill: Capillary refill takes less than 2 seconds.      Coloration: Skin is not jaundiced or pale.   Neurological:      General: No focal deficit present.      Mental Status: She is alert and oriented to person, place, and time. Mental status is at baseline.      Sensory: No sensory deficit.   Psychiatric:         Mood and Affect: Mood normal.         Behavior: Behavior normal.

## 2024-02-07 NOTE — ASSESSMENT & PLAN NOTE
Hx of SVT  Follows with Cardiology   Had Cardiac workup in Sept/2023  On exam, HR 85, RRR, without additional findings   Continue with Toprol-XL

## 2024-02-12 ENCOUNTER — TELEPHONE (OUTPATIENT)
Dept: INTERNAL MEDICINE CLINIC | Facility: CLINIC | Age: 64
End: 2024-02-12

## 2024-02-12 NOTE — PROGRESS NOTES
Addendum: I just reviewed the preoperative lab work.    Delia Morin is clear for surgery at this time.

## 2024-02-12 NOTE — TELEPHONE ENCOUNTER
Trisha, surgical coordinator called from [facility]. Pt saw Doctor Manolo Medrano on February 7th for her clearance for her surgery on February 28th. Dr Medrano stated she was clear pending Blood Work.Please review BW and advise if cleared.  [facility] can be reached at [phone]

## 2024-02-19 DIAGNOSIS — Z47.1 AFTERCARE FOLLOWING LEFT KNEE JOINT REPLACEMENT SURGERY: Primary | ICD-10-CM

## 2024-02-19 DIAGNOSIS — Z96.652 AFTERCARE FOLLOWING LEFT KNEE JOINT REPLACEMENT SURGERY: Primary | ICD-10-CM

## 2024-02-20 ENCOUNTER — OFFICE VISIT (OUTPATIENT)
Dept: CARDIAC SURGERY | Facility: CLINIC | Age: 64
End: 2024-02-20
Payer: MEDICARE

## 2024-02-20 VITALS
HEIGHT: 66 IN | OXYGEN SATURATION: 99 % | HEART RATE: 83 BPM | SYSTOLIC BLOOD PRESSURE: 122 MMHG | DIASTOLIC BLOOD PRESSURE: 66 MMHG | WEIGHT: 194 LBS | BODY MASS INDEX: 31.18 KG/M2

## 2024-02-20 DIAGNOSIS — I47.10 SVT (SUPRAVENTRICULAR TACHYCARDIA): ICD-10-CM

## 2024-02-20 DIAGNOSIS — Z01.810 PREOP CARDIOVASCULAR EXAM: Primary | ICD-10-CM

## 2024-02-20 DIAGNOSIS — E78.2 MIXED HYPERLIPIDEMIA: ICD-10-CM

## 2024-02-20 DIAGNOSIS — I10 PRIMARY HYPERTENSION: ICD-10-CM

## 2024-02-20 PROCEDURE — 93000 ELECTROCARDIOGRAM COMPLETE: CPT | Performed by: INTERNAL MEDICINE

## 2024-02-20 PROCEDURE — 99213 OFFICE O/P EST LOW 20 MIN: CPT | Performed by: INTERNAL MEDICINE

## 2024-02-20 NOTE — PROGRESS NOTES
Cardiology Consultation  [facility]iology [facility]      Delia Morin  1960  [id number]  [facility]  [address]    [place] [id number] [phone]    1. Preop cardiovascular exam  POCT ECG      2. Mixed hyperlipidemia        3. Primary hypertension        4. SVT (supraventricular tachycardia)               Discussion/Summary    Preop knee arthroplasty  Episode of SVT at [facility] which broke with adenosine per pt report 2023  A. Normal nuclear stress  Htn, controlled  Elev lipids, atorvastatin stated caused cough on zetia, cor  calcium score  11 on 9/2023    Plan  1) patient overall doing well.  No contraindication planned knee arthroplasty for which she needs both done.  Initial plan is on doing doing left knee  first.  Would proceed as planned, no testing required my standpoint preoperatively.  No med changes.      History:     63-year-old female prebilateral knee arthroplasty.    Originally saw last year after an episode of SVT while at [facility] in [place].  Stated had adenosine with break in her SVT.  No formal ECG seen but reviewed ED documentation    Some dyspnea on exertion and minimally elevated troponin during SVT.  This prompted stress testing which showed no evidence of ischemia post    Coronary calcium score with total score 11.  Was placed on atorvastatin primary prevention due to elevated lipids but had a cough and changed to Zetia for which she remains on    Blood pressure meds changes well.  Blood pressure well-controlled.    Besides knee arthritis no specific complaints.      Patient Active Problem List   Diagnosis    Primary osteoarthritis of both knees    Vitamin D deficiency    SVT (supraventricular tachycardia)    Single major depressive episode, in full remission (HCC)    Raynaud's syndrome without gangrene    Primary hypertension    Mixed hyperlipidemia    History of hepatitis B    Hiatal  hernia    Hepatic steatosis    Allergic rhinitis    Acquired hypothyroidism    Gastroesophageal reflux disease    Elevated MCV    Primary osteoarthritis of left knee     Past Medical History:   Diagnosis Date    Disease of thyroid gland     GERD (gastroesophageal reflux disease)     History of hepatitis B     Hyperlipidemia     Hypertension      Social History     Socioeconomic History    Marital status: /Civil Union     Spouse name: Not on file    Number of children: Not on file    Years of education: Not on file    Highest education level: Not on file   Occupational History    Not on file   Tobacco Use    Smoking status: Never    Smokeless tobacco: Never   Vaping Use    Vaping status: Never Used   Substance and Sexual Activity    Alcohol use: Yes     Alcohol/week: 7.0 standard drinks of alcohol     Types: 7 Glasses of wine per week    Drug use: Never    Sexual activity: Not on file   Other Topics Concern    Not on file   Social History Narrative    Not on file     Social Determinants of Health     Financial Resource Strain: Low Risk  (11/30/2023)    Received from [employer] [facility] and Its Subsidiaries and Affiliates    Overall Financial Resource Strain (CARDIA)     Difficulty of Paying Living Expenses: Not hard at all   Food Insecurity: No Food Insecurity (11/30/2023)    Received from [employer] [facility] and Its Subsidiaries and Affiliates    Hunger Vital Sign     Worried About Running Out of Food in the Last Year: Never true     Ran Out of Food in the Last Year: Never true   Transportation Needs: No Transportation Needs (11/30/2023)    Received from [employer] [facility] and Its Subsidiaries and Affiliates    PRAPARE - Transportation     Lack of Transportation (Medical): No     Lack of Transportation (Non-Medical): No   Physical Activity: Sufficiently Active (11/30/2023)    [facility]  [facility] and Its Subsidiaries and Affiliates    Exercise Vital Sign     Days of Exercise per Week: 3 days     Minutes of Exercise per Session: 60 min   Stress: No Stress Concern Present (11/30/2023)    [facility]    [employer] - Occupational Stress Questionnaire     Feeling of Stress : Not at all   Social Connections: Socially Integrated (11/30/2023)    [facility]    Social Connection and Isolation Panel [NHANES]     Frequency of Communication with Friends and Family: More than three times a week     Frequency of Social Gatherings with Friends and Family: More than three times a week     Attends Mu-ism Services: More than 4 times per year     Active Member of Clubs or Organizations: Yes     Attends Club or Organization Meetings: More than 4 times per year     Marital Status:    Intimate Partner Violence: Low Risk  (8/7/2023)    Received from [facility]    SDOH Do You Feel Safe At Home     Do you feel safe at home?: Yes   Housing Stability: Low Risk  (11/30/2023)    Received from [facility] and Its Subsidiaries and Affiliates    Housing Stability Vital Sign     Unable to Pay for Housing in the Last Year: No     Number of Places Lived in the Last Year: 2     Unstable Housing in the Last Year: No      History reviewed. No pertinent family history.  Past Surgical History:   Procedure Laterality Date    ABCESS DRAINAGE      tonsil at age 3    COLONOSCOPY         Current Outpatient Medications:     ascorbic acid (VITAMIN C) 500 mg tablet, Take 1 tablet (500 mg total) by mouth 2 (two) times a day for 60 doses, Disp: 60 tablet, Rfl: 0    Calcium Carb-Cholecalciferol 600-400 MG-UNIT TABS, Take by mouth, Disp: , Rfl:     cyanocobalamin (VITAMIN B-12) 1000 MCG  tablet, Take 1,000 mcg by mouth in the morning, Disp: , Rfl:     ezetimibe (ZETIA) 10 mg tablet, Take 10 mg by mouth in the morning, Disp: , Rfl:     ferrous sulfate 325 (65 Fe) mg tablet, Take 325 mg by mouth daily with breakfast, Disp: , Rfl:     folic acid (FOLVITE) 1 mg tablet, Take 1 mg by mouth in the morning, Disp: , Rfl:     hydrochlorothiazide (HYDRODIURIL) 25 mg tablet, Take 1 tablet (25 mg total) by mouth daily, Disp: 90 tablet, Rfl: 3    levothyroxine 75 mcg tablet, Take 1 tablet (75 mcg total) by mouth daily, Disp: 90 tablet, Rfl: 3    metoprolol succinate (TOPROL-XL) 50 mg 24 hr tablet, Take 1 tablet (50 mg total) by mouth daily, Disp: 90 tablet, Rfl: 3    omeprazole (PriLOSEC) 40 MG capsule, Take 1 capsule (40 mg total) by mouth 2 (two) times a day for 30 days, THEN 1 capsule (40 mg total) daily., Disp: 90 capsule, Rfl: 3    PARoxetine (PAXIL) 10 mg tablet, Take 1 tablet (10 mg total) by mouth daily, Disp: 90 tablet, Rfl: 0    triamcinolone (KENALOG) 0.1 % cream, , Disp: , Rfl:     valsartan (DIOVAN) 160 mg tablet, Take 1 tablet (160 mg total) by mouth daily, Disp: 90 tablet, Rfl: 3  Allergies   Allergen Reactions    Amlodipine Swelling and Other (See Comments)     Ankle swelling      Atorvastatin Cough and Myalgia     Cough    Penicillins Rash    Sulfa Antibiotics Rash       Social, Family and medication history as listed, reviewed and updated as necessary    Labs:   Lab Results   Component Value Date     10/29/2015    K 3.7 02/07/2024    CL 99 02/07/2024    CO2 28 02/07/2024    BUN 20 02/07/2024    CREATININE 1.03 02/07/2024    CREATININE 1.07 10/04/2023    GLUCOSE 86 10/29/2015    CALCIUM 9.6 02/07/2024       Lab Results   Component Value Date    WBC 5.70 02/07/2024    HGB 13.7 02/07/2024    HGB 12.9 10/04/2023    HCT 40.5 02/07/2024    HCT 38.6 10/04/2023     02/07/2024     10/04/2023       Lab Results   Component Value Date    CHOL 297 10/29/2015    CHOL 289 08/07/2014     Lab  "Results   Component Value Date     10/29/2015     08/07/2014     Lab Results   Component Value Date    LDLCALC 160 (H) 10/29/2015    LDLCALC 166 (H) 08/07/2014     Lab Results   Component Value Date    TRIG 58 10/29/2015    TRIG 37 08/07/2014     No results found for: \"LDLDIRECT\"    Lab Results   Component Value Date    ALT 22 02/07/2024    ALT 30 10/04/2023    AST 22 02/07/2024    AST 29 10/04/2023    ALKPHOS 76 02/07/2024    ALKPHOS 61 10/04/2023             No results found for: \"NTBNP\"    Lab Results   Component Value Date    HGBA1C 5.5 02/07/2024    HGBA1C 5.8 (H) 10/04/2023       Imaging: Reviewed in epic      Review of Systems:  14 systems reviewed and negative with exception of the above       PHYSICAL EXAM:        Vitals:    02/20/24 1528   BP: 122/66   Pulse: 83   SpO2: 99%     Body mass index is 31.31 kg/m².  Weight (last 2 days)       Date/Time Weight    02/20/24 1528 88 (194)               Gen: No acute distress  HEENT: anicteric, mucous membranes moist  Neck: supple, no jugular venous distention, or carotid bruit  Heart: regular, normal s1 and s2, no murmur/rub or gallop  Lungs :clear to auscultation bilaterally, no rales/rhonchi or wheeze  Abdomen: soft nontender, normoactive bowel sounds, no organomegaly  Ext: warm and perfused, normal femoral pulses, no edema, or clubbing  Skin: warm, no rashes  Neuro: AAO x 3, no focal findings  Psychiatric: normal affect  Musculoskeletal: no obvious joint deformities.        This note was completed in part utilizing direct voice recognition software.   Grammatical errors, random word insertion, spelling mistakes, and incomplete sentences may be an occasional consequence of the system secondary to software limitations, ambient noise and hardware issues. At the time of dictation, efforts were made to edit, clarify and /or correct errors.  Please read the chart carefully and recognize, using context, where substitutions have occurred.  If you have any " questions or concerns about the context, text or information contained within the body of this dictation, please contact myself, the provider, for further clarification.

## 2024-02-27 NOTE — DISCHARGE INSTR - AVS FIRST PAGE
Discharge Instructions - Orthopedics  Delia Morin 63 y.o. female MRN: [id number]  Unit/Bed#:     Weight Bearing Status:                                           Weight Bearing as tolerated to the left lower extremity.    DVT prophylaxis:  Complete course of Lovenox as directed    Pain:  Continue analgesics as directed    Showering Instructions:   Do not shower until followup     Dressing Instructions:   Keep dressing clean, dry and intact until follow up appointment.    Driving Instructions:  No driving until cleared by Orthopaedic Surgery.    PT/OT:  Continue PT/OT on outpatient basis as directed    Appt Instructions:   If you do not have your appointment, please call the clinic at [phone]  Otherwise followup as scheduled on 3/6/2024 in [place]     Contact the office sooner if you experience any increased numbness/tingling in the extremities.      Miscellaneous:  None

## 2024-02-28 ENCOUNTER — ANESTHESIA EVENT (OUTPATIENT)
Dept: PERIOP | Facility: HOSPITAL | Age: 64
End: 2024-02-28
Payer: MEDICARE

## 2024-02-28 ENCOUNTER — HOSPITAL ENCOUNTER (OUTPATIENT)
Facility: HOSPITAL | Age: 64
Setting detail: OUTPATIENT SURGERY
Discharge: HOME | End: 2024-02-29
Attending: ORTHOPAEDIC SURGERY | Admitting: ORTHOPAEDIC SURGERY
Payer: MEDICARE

## 2024-02-28 ENCOUNTER — ANESTHESIA (OUTPATIENT)
Dept: PERIOP | Facility: HOSPITAL | Age: 64
End: 2024-02-28
Payer: MEDICARE

## 2024-02-28 DIAGNOSIS — M25.561 CHRONIC PAIN OF BOTH KNEES: ICD-10-CM

## 2024-02-28 DIAGNOSIS — G89.29 CHRONIC PAIN OF BOTH KNEES: ICD-10-CM

## 2024-02-28 DIAGNOSIS — M17.12 ARTHRITIS OF LEFT KNEE: ICD-10-CM

## 2024-02-28 DIAGNOSIS — M17.12 PRIMARY OSTEOARTHRITIS OF LEFT KNEE: Primary | ICD-10-CM

## 2024-02-28 DIAGNOSIS — M25.562 CHRONIC PAIN OF BOTH KNEES: ICD-10-CM

## 2024-02-28 PROBLEM — Z96.652 S/P TOTAL KNEE ARTHROPLASTY, LEFT: Status: ACTIVE | Noted: 2024-02-28

## 2024-02-28 LAB
ABO GROUP BLD: NORMAL
GLUCOSE SERPL-MCNC: 121 MG/DL (ref 65–140)
RH BLD: POSITIVE

## 2024-02-28 PROCEDURE — C1776 JOINT DEVICE (IMPLANTABLE): HCPCS | Performed by: ORTHOPAEDIC SURGERY

## 2024-02-28 PROCEDURE — C1713 ANCHOR/SCREW BN/BN,TIS/BN: HCPCS | Performed by: ORTHOPAEDIC SURGERY

## 2024-02-28 PROCEDURE — C9290 INJ, BUPIVACAINE LIPOSOME: HCPCS | Performed by: ANESTHESIOLOGY

## 2024-02-28 PROCEDURE — 97163 PT EVAL HIGH COMPLEX 45 MIN: CPT

## 2024-02-28 PROCEDURE — NC001 PR NO CHARGE: Performed by: ORTHOPAEDIC SURGERY

## 2024-02-28 PROCEDURE — 99222 1ST HOSP IP/OBS MODERATE 55: CPT | Performed by: INTERNAL MEDICINE

## 2024-02-28 PROCEDURE — 27447 TOTAL KNEE ARTHROPLASTY: CPT | Performed by: ORTHOPAEDIC SURGERY

## 2024-02-28 PROCEDURE — 82948 REAGENT STRIP/BLOOD GLUCOSE: CPT

## 2024-02-28 PROCEDURE — S2900 ROBOTIC SURGICAL SYSTEM: HCPCS | Performed by: ORTHOPAEDIC SURGERY

## 2024-02-28 DEVICE — ATTUNE KNEE SYSTEM TIBIAL INSERT FIXED BEARING POSTERIOR STABILIZED 5 10MM AOX
Type: IMPLANTABLE DEVICE | Site: KNEE | Status: FUNCTIONAL
Brand: ATTUNE

## 2024-02-28 DEVICE — ATTUNE KNEE SYSTEM TIBIAL BASE FIXED BEARING SIZE 4 CEMENTED
Type: IMPLANTABLE DEVICE | Site: KNEE | Status: FUNCTIONAL
Brand: ATTUNE

## 2024-02-28 DEVICE — SMARTSET HV HIGH VISCOSITY BONE CEMENT 40G
Type: IMPLANTABLE DEVICE | Site: KNEE | Status: FUNCTIONAL
Brand: SMARTSET

## 2024-02-28 DEVICE — ATTUNE KNEE SYSTEM FEMORAL POSTERIOR STABILIZED NARROW SIZE 5N LEFT CEMENTED
Type: IMPLANTABLE DEVICE | Site: KNEE | Status: FUNCTIONAL
Brand: ATTUNE

## 2024-02-28 DEVICE — ATTUNE PATELLA MEDIALIZED DOME 35MM CEMENTED AOX
Type: IMPLANTABLE DEVICE | Site: KNEE | Status: FUNCTIONAL
Brand: ATTUNE

## 2024-02-28 RX ORDER — BUPIVACAINE HYDROCHLORIDE 7.5 MG/ML
INJECTION, SOLUTION INTRASPINAL AS NEEDED
Status: DISCONTINUED | OUTPATIENT
Start: 2024-02-28 | End: 2024-02-28

## 2024-02-28 RX ORDER — OXYCODONE HYDROCHLORIDE 5 MG/1
5 TABLET ORAL EVERY 4 HOURS PRN
Status: DISCONTINUED | OUTPATIENT
Start: 2024-02-28 | End: 2024-02-29 | Stop reason: HOSPADM

## 2024-02-28 RX ORDER — VASOPRESSIN 20 U/ML
INJECTION PARENTERAL AS NEEDED
Status: DISCONTINUED | OUTPATIENT
Start: 2024-02-28 | End: 2024-02-28

## 2024-02-28 RX ORDER — SODIUM CHLORIDE, SODIUM LACTATE, POTASSIUM CHLORIDE, CALCIUM CHLORIDE 600; 310; 30; 20 MG/100ML; MG/100ML; MG/100ML; MG/100ML
125 INJECTION, SOLUTION INTRAVENOUS CONTINUOUS
Status: DISCONTINUED | OUTPATIENT
Start: 2024-02-28 | End: 2024-02-29 | Stop reason: HOSPADM

## 2024-02-28 RX ORDER — PROPOFOL 10 MG/ML
INJECTION, EMULSION INTRAVENOUS AS NEEDED
Status: DISCONTINUED | OUTPATIENT
Start: 2024-02-28 | End: 2024-02-28

## 2024-02-28 RX ORDER — ENOXAPARIN SODIUM 100 MG/ML
40 INJECTION SUBCUTANEOUS DAILY
Status: DISCONTINUED | OUTPATIENT
Start: 2024-02-29 | End: 2024-02-29 | Stop reason: HOSPADM

## 2024-02-28 RX ORDER — METHOCARBAMOL 500 MG/1
500 TABLET, FILM COATED ORAL EVERY 6 HOURS PRN
Status: DISCONTINUED | OUTPATIENT
Start: 2024-02-28 | End: 2024-02-29 | Stop reason: HOSPADM

## 2024-02-28 RX ORDER — HYDROCHLOROTHIAZIDE 25 MG/1
25 TABLET ORAL DAILY
Status: DISCONTINUED | OUTPATIENT
Start: 2024-02-29 | End: 2024-02-28

## 2024-02-28 RX ORDER — MAGNESIUM HYDROXIDE 1200 MG/15ML
LIQUID ORAL AS NEEDED
Status: DISCONTINUED | OUTPATIENT
Start: 2024-02-28 | End: 2024-02-28 | Stop reason: HOSPADM

## 2024-02-28 RX ORDER — GABAPENTIN 100 MG/1
100 CAPSULE ORAL EVERY 8 HOURS
Status: DISCONTINUED | OUTPATIENT
Start: 2024-02-28 | End: 2024-02-28

## 2024-02-28 RX ORDER — OXYCODONE HYDROCHLORIDE 5 MG/1
5 TABLET ORAL EVERY 6 HOURS PRN
Qty: 20 TABLET | Refills: 0 | Status: SHIPPED | OUTPATIENT
Start: 2024-02-28 | End: 2024-03-09

## 2024-02-28 RX ORDER — HYDRALAZINE HYDROCHLORIDE 25 MG/1
25 TABLET, FILM COATED ORAL EVERY 8 HOURS PRN
Status: DISCONTINUED | OUTPATIENT
Start: 2024-02-28 | End: 2024-02-29 | Stop reason: HOSPADM

## 2024-02-28 RX ORDER — TRANEXAMIC ACID 10 MG/ML
1000 INJECTION, SOLUTION INTRAVENOUS ONCE
Status: COMPLETED | OUTPATIENT
Start: 2024-02-28 | End: 2024-02-28

## 2024-02-28 RX ORDER — HYDROMORPHONE HCL/PF 1 MG/ML
0.5 SYRINGE (ML) INJECTION EVERY 2 HOUR PRN
Status: DISCONTINUED | OUTPATIENT
Start: 2024-02-28 | End: 2024-02-29 | Stop reason: HOSPADM

## 2024-02-28 RX ORDER — LEVOTHYROXINE SODIUM 0.07 MG/1
75 TABLET ORAL
Status: DISCONTINUED | OUTPATIENT
Start: 2024-02-29 | End: 2024-02-29 | Stop reason: HOSPADM

## 2024-02-28 RX ORDER — MIDAZOLAM HYDROCHLORIDE 2 MG/2ML
INJECTION, SOLUTION INTRAMUSCULAR; INTRAVENOUS AS NEEDED
Status: DISCONTINUED | OUTPATIENT
Start: 2024-02-28 | End: 2024-02-28

## 2024-02-28 RX ORDER — FENTANYL CITRATE 50 UG/ML
INJECTION, SOLUTION INTRAMUSCULAR; INTRAVENOUS AS NEEDED
Status: DISCONTINUED | OUTPATIENT
Start: 2024-02-28 | End: 2024-02-28

## 2024-02-28 RX ORDER — CEFAZOLIN SODIUM 1 G/50ML
1000 SOLUTION INTRAVENOUS ONCE
Status: COMPLETED | OUTPATIENT
Start: 2024-02-28 | End: 2024-02-29

## 2024-02-28 RX ORDER — ACETAMINOPHEN 325 MG/1
975 TABLET ORAL EVERY 8 HOURS SCHEDULED
Status: DISCONTINUED | OUTPATIENT
Start: 2024-02-28 | End: 2024-02-29 | Stop reason: HOSPADM

## 2024-02-28 RX ORDER — BUPIVACAINE HYDROCHLORIDE 2.5 MG/ML
INJECTION, SOLUTION EPIDURAL; INFILTRATION; INTRACAUDAL AS NEEDED
Status: DISCONTINUED | OUTPATIENT
Start: 2024-02-28 | End: 2024-02-28

## 2024-02-28 RX ORDER — DEXAMETHASONE SODIUM PHOSPHATE 10 MG/ML
INJECTION, SOLUTION INTRAMUSCULAR; INTRAVENOUS AS NEEDED
Status: DISCONTINUED | OUTPATIENT
Start: 2024-02-28 | End: 2024-02-28

## 2024-02-28 RX ORDER — ACETAMINOPHEN 325 MG/1
650 TABLET ORAL EVERY 6 HOURS PRN
Status: DISCONTINUED | OUTPATIENT
Start: 2024-02-28 | End: 2024-02-28

## 2024-02-28 RX ORDER — LIDOCAINE HYDROCHLORIDE 10 MG/ML
INJECTION, SOLUTION EPIDURAL; INFILTRATION; INTRACAUDAL; PERINEURAL AS NEEDED
Status: DISCONTINUED | OUTPATIENT
Start: 2024-02-28 | End: 2024-02-28

## 2024-02-28 RX ORDER — CEFAZOLIN SODIUM 2 G/50ML
2000 SOLUTION INTRAVENOUS ONCE
Status: COMPLETED | OUTPATIENT
Start: 2024-02-28 | End: 2024-02-28

## 2024-02-28 RX ORDER — SENNOSIDES 8.6 MG
650 CAPSULE ORAL EVERY 8 HOURS PRN
Qty: 30 TABLET | Refills: 0 | Status: SHIPPED | OUTPATIENT
Start: 2024-02-28

## 2024-02-28 RX ORDER — ONDANSETRON 2 MG/ML
4 INJECTION INTRAMUSCULAR; INTRAVENOUS ONCE AS NEEDED
Status: DISCONTINUED | OUTPATIENT
Start: 2024-02-28 | End: 2024-02-28 | Stop reason: HOSPADM

## 2024-02-28 RX ORDER — GABAPENTIN 100 MG/1
100 CAPSULE ORAL 3 TIMES DAILY
Status: DISCONTINUED | OUTPATIENT
Start: 2024-02-28 | End: 2024-02-29 | Stop reason: HOSPADM

## 2024-02-28 RX ORDER — METOPROLOL SUCCINATE 50 MG/1
50 TABLET, EXTENDED RELEASE ORAL DAILY
Status: DISCONTINUED | OUTPATIENT
Start: 2024-02-29 | End: 2024-02-29 | Stop reason: HOSPADM

## 2024-02-28 RX ORDER — ONDANSETRON 2 MG/ML
INJECTION INTRAMUSCULAR; INTRAVENOUS AS NEEDED
Status: DISCONTINUED | OUTPATIENT
Start: 2024-02-28 | End: 2024-02-28

## 2024-02-28 RX ORDER — EZETIMIBE 10 MG/1
10 TABLET ORAL DAILY
Status: DISCONTINUED | OUTPATIENT
Start: 2024-02-29 | End: 2024-02-29 | Stop reason: HOSPADM

## 2024-02-28 RX ORDER — METOCLOPRAMIDE HYDROCHLORIDE 5 MG/ML
10 INJECTION INTRAMUSCULAR; INTRAVENOUS ONCE AS NEEDED
Status: DISCONTINUED | OUTPATIENT
Start: 2024-02-28 | End: 2024-02-28 | Stop reason: HOSPADM

## 2024-02-28 RX ORDER — OXYCODONE HYDROCHLORIDE 10 MG/1
10 TABLET ORAL EVERY 4 HOURS PRN
Status: DISCONTINUED | OUTPATIENT
Start: 2024-02-28 | End: 2024-02-29 | Stop reason: HOSPADM

## 2024-02-28 RX ORDER — ONDANSETRON 2 MG/ML
4 INJECTION INTRAMUSCULAR; INTRAVENOUS EVERY 6 HOURS PRN
Status: DISCONTINUED | OUTPATIENT
Start: 2024-02-28 | End: 2024-02-29 | Stop reason: HOSPADM

## 2024-02-28 RX ORDER — BUPIVACAINE HYDROCHLORIDE 5 MG/ML
INJECTION, SOLUTION EPIDURAL; INTRACAUDAL AS NEEDED
Status: DISCONTINUED | OUTPATIENT
Start: 2024-02-28 | End: 2024-02-28

## 2024-02-28 RX ORDER — HYDROMORPHONE HCL/PF 1 MG/ML
0.5 SYRINGE (ML) INJECTION
Status: DISCONTINUED | OUTPATIENT
Start: 2024-02-28 | End: 2024-02-28 | Stop reason: HOSPADM

## 2024-02-28 RX ORDER — CHLORHEXIDINE GLUCONATE ORAL RINSE 1.2 MG/ML
15 SOLUTION DENTAL ONCE
Status: COMPLETED | OUTPATIENT
Start: 2024-02-28 | End: 2024-02-28

## 2024-02-28 RX ORDER — GABAPENTIN 100 MG/1
100 CAPSULE ORAL 3 TIMES DAILY
Qty: 90 CAPSULE | Refills: 0 | Status: SHIPPED | OUTPATIENT
Start: 2024-02-28

## 2024-02-28 RX ORDER — PAROXETINE 10 MG/1
10 TABLET, FILM COATED ORAL DAILY
Status: DISCONTINUED | OUTPATIENT
Start: 2024-02-29 | End: 2024-02-29 | Stop reason: HOSPADM

## 2024-02-28 RX ORDER — METHOCARBAMOL 500 MG/1
500 TABLET, FILM COATED ORAL EVERY 6 HOURS SCHEDULED
Status: DISCONTINUED | OUTPATIENT
Start: 2024-02-28 | End: 2024-02-28

## 2024-02-28 RX ORDER — DOCUSATE SODIUM 100 MG/1
100 CAPSULE, LIQUID FILLED ORAL 2 TIMES DAILY
Status: DISCONTINUED | OUTPATIENT
Start: 2024-02-28 | End: 2024-02-29 | Stop reason: HOSPADM

## 2024-02-28 RX ORDER — CALCIUM CARBONATE 500 MG/1
1000 TABLET, CHEWABLE ORAL DAILY PRN
Status: DISCONTINUED | OUTPATIENT
Start: 2024-02-28 | End: 2024-02-29 | Stop reason: HOSPADM

## 2024-02-28 RX ORDER — PROPOFOL 10 MG/ML
INJECTION, EMULSION INTRAVENOUS CONTINUOUS PRN
Status: DISCONTINUED | OUTPATIENT
Start: 2024-02-28 | End: 2024-02-28

## 2024-02-28 RX ADMIN — SODIUM CHLORIDE, SODIUM LACTATE, POTASSIUM CHLORIDE, AND CALCIUM CHLORIDE 125 ML/HR: .6; .31; .03; .02 INJECTION, SOLUTION INTRAVENOUS at 09:24

## 2024-02-28 RX ADMIN — VASOPRESSIN 2 UNITS: 20 INJECTION INTRAVENOUS at 11:11

## 2024-02-28 RX ADMIN — CEFAZOLIN SODIUM 2000 MG: 2 SOLUTION INTRAVENOUS at 11:09

## 2024-02-28 RX ADMIN — VASOPRESSIN 2 UNITS: 20 INJECTION INTRAVENOUS at 12:25

## 2024-02-28 RX ADMIN — OXYCODONE HYDROCHLORIDE 10 MG: 10 TABLET ORAL at 16:03

## 2024-02-28 RX ADMIN — ACETAMINOPHEN 975 MG: 325 TABLET, FILM COATED ORAL at 14:12

## 2024-02-28 RX ADMIN — SODIUM CHLORIDE, SODIUM LACTATE, POTASSIUM CHLORIDE, AND CALCIUM CHLORIDE: .6; .31; .03; .02 INJECTION, SOLUTION INTRAVENOUS at 12:26

## 2024-02-28 RX ADMIN — BUPIVACAINE HYDROCHLORIDE 7.5 ML: 5 INJECTION, SOLUTION EPIDURAL; INTRACAUDAL; PERINEURAL at 10:40

## 2024-02-28 RX ADMIN — METHOCARBAMOL 500 MG: 500 TABLET ORAL at 14:14

## 2024-02-28 RX ADMIN — CHLORHEXIDINE GLUCONATE 15 ML: 1.2 SOLUTION ORAL at 09:14

## 2024-02-28 RX ADMIN — BUPIVACAINE 15 ML: 13.3 INJECTION, SUSPENSION, LIPOSOMAL INFILTRATION at 10:40

## 2024-02-28 RX ADMIN — DEXAMETHASONE SODIUM PHOSPHATE 10 MG: 10 INJECTION, SOLUTION INTRAMUSCULAR; INTRAVENOUS at 11:07

## 2024-02-28 RX ADMIN — MIDAZOLAM 2 MG: 1 INJECTION INTRAMUSCULAR; INTRAVENOUS at 10:39

## 2024-02-28 RX ADMIN — FENTANYL CITRATE 50 MCG: 50 INJECTION INTRAMUSCULAR; INTRAVENOUS at 10:39

## 2024-02-28 RX ADMIN — ACETAMINOPHEN 975 MG: 325 TABLET, FILM COATED ORAL at 21:36

## 2024-02-28 RX ADMIN — OXYCODONE HYDROCHLORIDE 10 MG: 10 TABLET ORAL at 20:29

## 2024-02-28 RX ADMIN — BUPIVACAINE HYDROCHLORIDE IN DEXTROSE 1.4 ML: 7.5 INJECTION, SOLUTION SUBARACHNOID at 11:06

## 2024-02-28 RX ADMIN — BUPIVACAINE HYDROCHLORIDE 20 ML: 2.5 INJECTION, SOLUTION EPIDURAL; INFILTRATION; INTRACAUDAL; PERINEURAL at 10:45

## 2024-02-28 RX ADMIN — ONDANSETRON 4 MG: 2 INJECTION INTRAMUSCULAR; INTRAVENOUS at 11:07

## 2024-02-28 RX ADMIN — PROPOFOL 50 MG: 10 INJECTION, EMULSION INTRAVENOUS at 11:07

## 2024-02-28 RX ADMIN — GABAPENTIN 100 MG: 100 CAPSULE ORAL at 21:36

## 2024-02-28 RX ADMIN — GABAPENTIN 100 MG: 100 CAPSULE ORAL at 16:03

## 2024-02-28 RX ADMIN — LIDOCAINE HYDROCHLORIDE 20 MG: 10 INJECTION, SOLUTION EPIDURAL; INFILTRATION; INTRACAUDAL; PERINEURAL at 11:07

## 2024-02-28 RX ADMIN — FENTANYL CITRATE 50 MCG: 50 INJECTION INTRAMUSCULAR; INTRAVENOUS at 10:43

## 2024-02-28 RX ADMIN — BUPIVACAINE 5 ML: 13.3 INJECTION, SUSPENSION, LIPOSOMAL INFILTRATION at 10:45

## 2024-02-28 RX ADMIN — VASOPRESSIN 0.05 UNITS/MIN: 20 INJECTION INTRAVENOUS at 11:12

## 2024-02-28 RX ADMIN — SODIUM CHLORIDE, SODIUM LACTATE, POTASSIUM CHLORIDE, AND CALCIUM CHLORIDE: .6; .31; .03; .02 INJECTION, SOLUTION INTRAVENOUS at 10:17

## 2024-02-28 RX ADMIN — CEFAZOLIN SODIUM 1000 MG: 1 SOLUTION INTRAVENOUS at 20:28

## 2024-02-28 RX ADMIN — TRANEXAMIC ACID 1000 MG: 10 INJECTION, SOLUTION INTRAVENOUS at 11:16

## 2024-02-28 RX ADMIN — PROPOFOL 70 MCG/KG/MIN: 10 INJECTION, EMULSION INTRAVENOUS at 11:07

## 2024-02-28 NOTE — H&P (VIEW-ONLY)
Office Visit  9/14/2023  [facility] Dereck Leblanc MD  Orthopedic Surgery Arthritis of left knee +3 more  Dx Left Knee - Follow-up  Reason for Visit     Progress Notes  Jacob Yao PA-C (Physician Assistant)  Orthopedic Surgery  Cosigned by: Ignacio Leblanc MD at 9/14/2023  4:53 PM       Attestation signed by Ignacio Leblanc MD at 9/14/2023  4:53 PM     And examined today.  Case was reviewed with the physician assistant today.  I agree with the history, exam, assessment, plan as document by the physician assistant  66-year-old female claus luke presents for follow-up.  She describes excellent relief of knee pain following injections of corticosteroid that I have given her several months back.  She describes return to weightbearing pain since.  She describes pain level of both knee joints, the pain is made worse bearing weight, the pain increases with increased activities.  She currently utilizes the number 9 out of 10 utilizing a 10 point pain scale to describe the levels of pain in both the right and left knee  Emanation confirm each knee have an intact soft tissue envelope.  Knee the knees effusion.  Each knee has crepitation flexion-extension.  Each knee has bony enlargement tenderness medially.  Neither knee is palp warmth and significant.  Neither knee has contractures  Assessment: Adult female osteoarthritis in both knees with return of symptoms more severe on the left than the right.  All diagnoses were discussed with the patient.  Treatment option clued risk, benefits, return discussed in detail.  I think elective left total knee replacement surgery is indicated.  After review the risks and benefits, consents have the above-mentioned surgery.  No guarantees were given.  Office follow-up as a postoperative patient is my recommendation            Expand All Collapse All  Subjective;   63-year-old female with established osteoarthritis of  both knees.  Received injections in June at her previous appointment, right 1 gave her relief ,     the left 1 no symptomatic improvement.  Then she also had lubricant injections.  She continues to have left greater than right knee pain.  Historically the right knee has been a lesser and its length of time, paired to 30+ years for the left.  Denies any minimally invasive procedure or arthroscopy of the knees     Originally she felt that surgery on either knee would be October 2024.  She now finds that this may be a mobile date of and her lack of improvement on the left knee     Medical History   History reviewed. No pertinent past medical history.        Surgical History   History reviewed. No pertinent surgical history.        Family History   History reviewed. No pertinent family history.        Social History            Tobacco Use    Smoking status: Never    Smokeless tobacco: Never   Substance Use Topics    Alcohol use: Yes       Alcohol/week: 7.0 standard drinks of alcohol       Types: 7 Glasses of wine per week    Drug use: Never      Exam;     Adult female in no acute distress  ENT; NC/AT  Neck; FROM  Chest; CTA  CVS; RRR  Abdomen; Soft, Nontender  Musculoskeletal;     She has the ability to extend both knees fully despite the radiographic changes that are evident  Has patellofemoral grating that is palpable right greater than left knee, both patellas lateralize through the range of motion arc.  She has significantly more discomfort to palpation of the medial and lateral compartment of her left knee than she does the right.     X-rays are from June and were previously reviewed with the patient.  In review both x-rays show tricompartmental osteoarthritic changes.  There is directed to the formal radiologist interpretation on epic.     Impression;     Significant and advanced bilateral knee osteoarthritis  Left knee osteoarthritis  Left knee pain     Plan;     She is offered and accepts left total knee  "replacement arthroplasty.  The perioperative period was discussed with the patient by the attending surgeon, and all of her questions answered        Instructions    After Visit Summary (Automatic SnapShot taken 9/14/2023)  Additional Documentation    Vitals: BP 95/60     Pulse 80     Ht 5' 6\" (1.676 m)     Wt 87.1 kg (192 lb)     BMI 30.99 kg/m²     BSA 1.97 m²          More Vitals   SmartForms:  [place] PRE-CHARTING   Encounter Info: Billing Info,     History,     Allergies,     Detailed Report     Communications    View All Conversations on this Encounter  Orders Placed      CBC and differential    Anemia Panel w/Reflex    APTT    Comprehensive metabolic panel    Protime-INR    Type and screen    Hemoglobin A1C W/EAG Estimation    Ambulatory referral to Physical Therapy Pending Review    Case request operating room: ARTHROPLASTY KNEE TOTAL W ROBOT Once  All Encounter Results  Medication Changes      Ascorbic Acid 500 mg Oral 2 times daily    Enoxaparin Sodium 40 mg Subcutaneous Daily (early morning)    Ferrous Sulfate 324 mg Oral Daily before breakfast, Take 1 pill daily, PRE-OP with Vit C,..may be constipatory  Medication List  Visit Diagnoses      Arthritis of left knee    Primary osteoarthritis of both knees    Chronic pain of both knees    Anticoagulation management encounter  Problem List  Encounters with Related Results    Hospital Encounter (2/28/2024)  Appointment (2/7/2024)  "

## 2024-02-28 NOTE — INTERVAL H&P NOTE
H&P reviewed. After examining the patient I find no changes in the patients condition since the H&P had been written.    Vitals:    02/28/24 0902   BP: 128/86   Pulse: 71   Resp: 18   Temp: (!) 97.1 °F (36.2 °C)   SpO2: 93%   Head: Presents  CVS: RRR  Lungs: Clear bilateral  Abdomen: Present  Assessment: Adult female with osteoarthritis left knee that remains symptomatic despite long course of nonoperative treatment.  She continues to have significant pain and dysfunction  Plan: Left total knee arthroplasty.  Patient is familiar with risks, benefits, and alternatives

## 2024-02-28 NOTE — ANESTHESIA PROCEDURE NOTES
Peripheral Block    Patient location during procedure: holding area  Start time: 2/28/2024 10:40 AM  Reason for block: at surgeon's request and post-op pain management  Staffing  Performed by: Win Rosales MD  Authorized by: Win Rosales MD    Preanesthetic Checklist  Completed: patient identified, IV checked, site marked, risks and benefits discussed, surgical consent, monitors and equipment checked, pre-op evaluation and timeout performed  Peripheral Block  Patient position: supine  Prep: ChloraPrep  Patient monitoring: frequent blood pressure checks, continuous pulse oximetry and heart rate  Block type: Adductor Canal  Laterality: left  Injection technique: single-shot  Procedures: ultrasound guided, Ultrasound guidance required for the procedure to increase accuracy and safety of medication placement and decrease risk of complications.  Ultrasound permanent image saved  Needle  Needle type: Stimuplex   Needle gauge: 20 G  Needle length: 4 in  Needle localization: anatomical landmarks and ultrasound guidance  Assessment  Injection assessment: incremental injection, frequent aspiration, injected with ease, negative aspiration, negative for heart rate change, no paresthesia on injection, no symptoms of intraneural/intravenous injection and needle tip visualized at all times  Paresthesia pain: none  Post-procedure:  site cleaned  patient tolerated the procedure well with no immediate complications

## 2024-02-28 NOTE — ANESTHESIA PROCEDURE NOTES
Peripheral Block    Patient location during procedure: holding area  Start time: 2/28/2024 10:45 AM  Reason for block: at surgeon's request and post-op pain management  Staffing  Performed by: Win Rosales MD  Authorized by: Win Rosales MD    Preanesthetic Checklist  Completed: patient identified, IV checked, site marked, risks and benefits discussed, surgical consent, monitors and equipment checked, pre-op evaluation and timeout performed  Peripheral Block  Prep: ChloraPrep  Patient monitoring: frequent blood pressure checks, continuous pulse oximetry and heart rate  Block type: IPACK  Laterality: left  Injection technique: single-shot  Procedures: ultrasound guided, Ultrasound guidance required for the procedure to increase accuracy and safety of medication placement and decrease risk of complications.  Ultrasound permanent image saved  Needle  Needle type: Stimuplex   Needle gauge: 20 G  Needle length: 4 in  Needle localization: anatomical landmarks and ultrasound guidance  Assessment  Injection assessment: incremental injection, frequent aspiration, injected with ease, negative aspiration, negative for heart rate change, no paresthesia on injection, no symptoms of intraneural/intravenous injection and needle tip visualized at all times  Paresthesia pain: none  Post-procedure:  site cleaned  patient tolerated the procedure well with no immediate complications

## 2024-02-28 NOTE — ASSESSMENT & PLAN NOTE
Failed outpatient conservative measures and opted for arthroplasty  Pain controlled  No post operative issues noted  Continue encourage incentive spirometry; monitor fever curve  DVT prophylaxis in place and reviewed

## 2024-02-28 NOTE — ANESTHESIA PROCEDURE NOTES
Spinal Block    Patient location during procedure: OR  Start time: 2/28/2024 11:06 AM  Reason for block: primary anesthetic  Staffing  Performed by: Lew Hudson CRNA  Authorized by: Lew Hudson CRNA    Preanesthetic Checklist  Completed: patient identified, IV checked, site marked, risks and benefits discussed, surgical consent, monitors and equipment checked, pre-op evaluation and timeout performed  Spinal Block  Patient position: sitting  Prep: ChloraPrep  Patient monitoring: continuous pulse ox and frequent blood pressure checks  Approach: midline  Location: L2-3  Injection technique: single-shot  Needle  Needle type: pencil-tip   Needle gauge: 25 G  Needle length: 5 cm  Assessment  Sensory level: T10  Events: cerebrospinal fluid  Injection Assessment:  negative aspiration for heme, no paresthesia on injection and positive aspiration for clear CSF.  Post-procedure:  site cleaned

## 2024-02-28 NOTE — CONSULTS
Office Visit  9/14/2023  [facility] Dereck Leblanc MD  Orthopedic Surgery Arthritis of left knee +3 more  Dx Left Knee - Follow-up  Reason for Visit     Progress Notes  Jacob Yao PA-C (Physician Assistant)  Orthopedic Surgery  Cosigned by: Ignacio Leblanc MD at 9/14/2023  4:53 PM       Attestation signed by Ignacio Leblanc MD at 9/14/2023  4:53 PM     And examined today.  Case was reviewed with the physician assistant today.  I agree with the history, exam, assessment, plan as document by the physician assistant  66-year-old female claus luke presents for follow-up.  She describes excellent relief of knee pain following injections of corticosteroid that I have given her several months back.  She describes return to weightbearing pain since.  She describes pain level of both knee joints, the pain is made worse bearing weight, the pain increases with increased activities.  She currently utilizes the number 9 out of 10 utilizing a 10 point pain scale to describe the levels of pain in both the right and left knee  Emanation confirm each knee have an intact soft tissue envelope.  Knee the knees effusion.  Each knee has crepitation flexion-extension.  Each knee has bony enlargement tenderness medially.  Neither knee is palp warmth and significant.  Neither knee has contractures  Assessment: Adult female osteoarthritis in both knees with return of symptoms more severe on the left than the right.  All diagnoses were discussed with the patient.  Treatment option clued risk, benefits, return discussed in detail.  I think elective left total knee replacement surgery is indicated.  After review the risks and benefits, consents have the above-mentioned surgery.  No guarantees were given.  Office follow-up as a postoperative patient is my recommendation            Expand All Collapse All  Subjective;   63-year-old female with established osteoarthritis of  both knees.  Received injections in June at her previous appointment, right 1 gave her relief ,     the left 1 no symptomatic improvement.  Then she also had lubricant injections.  She continues to have left greater than right knee pain.  Historically the right knee has been a lesser and its length of time, paired to 30+ years for the left.  Denies any minimally invasive procedure or arthroscopy of the knees     Originally she felt that surgery on either knee would be October 2024.  She now finds that this may be a mobile date of and her lack of improvement on the left knee     Medical History   History reviewed. No pertinent past medical history.        Surgical History   History reviewed. No pertinent surgical history.        Family History   History reviewed. No pertinent family history.        Social History            Tobacco Use    Smoking status: Never    Smokeless tobacco: Never   Substance Use Topics    Alcohol use: Yes       Alcohol/week: 7.0 standard drinks of alcohol       Types: 7 Glasses of wine per week    Drug use: Never      Exam;     Adult female in no acute distress  ENT; NC/AT  Neck; FROM  Chest; CTA  CVS; RRR  Abdomen; Soft, Nontender  Musculoskeletal;     She has the ability to extend both knees fully despite the radiographic changes that are evident  Has patellofemoral grating that is palpable right greater than left knee, both patellas lateralize through the range of motion arc.  She has significantly more discomfort to palpation of the medial and lateral compartment of her left knee than she does the right.     X-rays are from June and were previously reviewed with the patient.  In review both x-rays show tricompartmental osteoarthritic changes.  There is directed to the formal radiologist interpretation on epic.     Impression;     Significant and advanced bilateral knee osteoarthritis  Left knee osteoarthritis  Left knee pain     Plan;     She is offered and accepts left total knee  "replacement arthroplasty.  The perioperative period was discussed with the patient by the attending surgeon, and all of her questions answered        Instructions    After Visit Summary (Automatic SnapShot taken 9/14/2023)  Additional Documentation    Vitals: BP 95/60     Pulse 80     Ht 5' 6\" (1.676 m)     Wt 87.1 kg (192 lb)     BMI 30.99 kg/m²     BSA 1.97 m²          More Vitals   SmartForms:  [place] PRE-CHARTING   Encounter Info: Billing Info,     History,     Allergies,     Detailed Report     Communications    View All Conversations on this Encounter  Orders Placed      CBC and differential    Anemia Panel w/Reflex    APTT    Comprehensive metabolic panel    Protime-INR    Type and screen    Hemoglobin A1C W/EAG Estimation    Ambulatory referral to Physical Therapy Pending Review    Case request operating room: ARTHROPLASTY KNEE TOTAL W ROBOT Once  All Encounter Results  Medication Changes      Ascorbic Acid 500 mg Oral 2 times daily    Enoxaparin Sodium 40 mg Subcutaneous Daily (early morning)    Ferrous Sulfate 324 mg Oral Daily before breakfast, Take 1 pill daily, PRE-OP with Vit C,..may be constipatory  Medication List  Visit Diagnoses      Arthritis of left knee    Primary osteoarthritis of both knees    Chronic pain of both knees    Anticoagulation management encounter  Problem List  Encounters with Related Results    Hospital Encounter (2/28/2024)  Appointment (2/7/2024)  "

## 2024-02-28 NOTE — CONSULTS
[facility]  Consult  Name: Delia Morin 63 y.o. female I MRN: [id number]  Unit/Bed#: OR POOL I Date of Admission: 2/28/2024   Date of Service: 2/28/2024 I Hospital Day: 0    Inpatient consult to Internal Medicine  Consult performed by: CIELO Perales  Consult ordered by: Randa Panda PA-C          Assessment/Plan   S/P total knee arthroplasty, left  Assessment & Plan  Failed outpatient conservative measures and opted for arthroplasty  Pain controlled  No post operative issues noted  Continue encourage incentive spirometry; monitor fever curve  DVT prophylaxis in place and reviewed          Gastroesophageal reflux disease  Assessment & Plan  Cont PPI  Monitor for nausea/vomiting      Acquired hypothyroidism  Assessment & Plan  Cont levothyroxine as prescribed        Primary hypertension  Assessment & Plan  Hold diovan/hctz to avoid YAIMA in the post operative setting  OK to resume in 48 hours if bmp is stable or on DC  Add hydralazine as needed for SBP >160         PRE-OP HGB LEVEL: 13.7    Subjective/ HPI: Delia Morin was seen and examined. Hx of KNEE pain failed out patient conservative measures. Elected to undergo total KNEE arthroplasty We are asked to see patient for post op management of underlying medical co-morbidities as outlined above. Pt did well intra and post operatively with good hemodynamics. Pt currently comfortable and without any reported post op nausea.             ROS:   A 10 point ROS was performed; negative except as noted above.     Social History:    Substance Use History:   Social History     Substance and Sexual Activity   Alcohol Use Yes    Alcohol/week: 7.0 standard drinks of alcohol    Types: 7 Glasses of wine per week     Social History     Tobacco Use   Smoking Status Never   Smokeless Tobacco Never     Social History     Substance and Sexual Activity   Drug Use Never       Family History:    History reviewed. No pertinent family  history.      Review of Scheduled Meds:  Current Facility-Administered Medications   Medication Dose Route Frequency Provider Last Rate    acetaminophen  650 mg Oral Q6H PRN Randa Panda PA-C      bupivacaine liposomal  20 mL Infiltration Once Win Rosales MD      calcium carbonate  1,000 mg Oral Daily PRN Randa Panda PA-C      cefazolin  1,000 mg Intravenous Once Randa Panda PA-C      cefazolin  2,000 mg Intravenous Once Jacob Yao PA-C      docusate sodium  100 mg Oral BID Randa Panda PA-C      [START ON 2/29/2024] enoxaparin  40 mg Subcutaneous Daily Randa Panda PA-C      ezetimibe  10 mg Oral Daily Randa Panda PA-C      gabapentin  100 mg Oral Q8H Randa Panda PA-C      hydroCHLOROthiazide  25 mg Oral Daily Randa Panda PA-C      HYDROmorphone  0.5 mg Intravenous Q2H PRN WILMAR Hugo-ANNA      lactated ringers  1,000 mL Intravenous Once PRN Randa Panda PA-C      And    lactated ringers  1,000 mL Intravenous Once PRN Randa Panda PA-C      lactated ringers  125 mL/hr Intravenous Continuous Randa Panda PA-C      lactated ringers  125 mL/hr Intravenous Continuous Jacob Yao PA-C 125 mL/hr (02/28/24 0924)    levothyroxine  75 mcg Oral Daily Randa Panda PA-C      methocarbamol  500 mg Oral Q6H BRIAN Randa Panda PA-C      metoprolol succinate  50 mg Oral Daily Randa Panda PA-C      ondansetron  4 mg Intravenous Q6H PRN Randa Panda PA-C      oxyCODONE  10 mg Oral Q4H PRN Randa Panda PA-C      oxyCODONE  5 mg Oral Q4H PRN Randa Panda PA-C      PARoxetine  10 mg Oral Daily Randa Panda PA-C      povidone-iodine (BETADINE) 10 % 20 Application in sodium chloride 0.9 % 500 mL irrigation bottle   Irrigation Once Ignacio Leblanc MD      sodium chloride  1,000 mL Intravenous Once PRN Randa Panda PA-C      And    sodium chloride  1,000 mL Intravenous Once PRN Randa Panda PA-C      tranexamic acid  1,000 mg Intravenous Once Jacob Yao PA-C         Labs:               Invalid input(s):  "\"LABGLOM\", \"CMP\"                   No results found for: \"BLOODCX\", \"URINECX\", \"WOUNDCULT\", \"SPUTUMCULTUR\"    Input and Output Summary (last 24 hours):       Intake/Output Summary (Last 24 hours) at 2024 1041  Last data filed at 2024 1017  Gross per 24 hour   Intake 1000 ml   Output --   Net 1000 ml       Imaging:     No orders to display       *Labs /Radiology studiesLabs reviewed  *Medications reviewed and reconciled as needed  *Please refer to order section for additional ordered labs studies  *Case discussed with primary attending during morning huddle case rounds    Vitals:   Temp (24hrs), Av.1 °F (36.2 °C), Min:97.1 °F (36.2 °C), Max:97.1 °F (36.2 °C)    Temp:  [97.1 °F (36.2 °C)] 97.1 °F (36.2 °C)  HR:  [71] 71  Resp:  [18] 18  BP: (128)/(86) 128/86  SpO2:  [93 %] 93 %  Body mass index is 30.67 kg/m².     Physical Exam:   General Appearance: no distress, conversive  HEENT: PERRLA, conjuctiva normal; oropharynx clear; mucous membranes moist;   Neck:  Supple, no lymphadenopathy or thyromegaly  Lungs: clear bilaterally, normal respiratory effort, no retractions, expiratory effort normal  CV: S1 S2, no murmurs rubs or gallops, rate is regular   ABD: soft non tender, +BS x4  EXT: DP pulses intact, no lymphadenopathy, no edema ;  right KNEE dressing in place  Skin: normal turgor, normal texture, no rash  Psych: affect normal, mood normal  Neuro: AAOx3          Invasive Devices       Peripheral Intravenous Line  Duration             Peripheral IV 24 Right Hand <1 day                       Code Status: Level 1 - Full Code  Current Length of Stay: 0 day(s)    Total floor / unit time spent today 30 minutes  Coordination of patient's care was performed in conjunction with primary service. Time invested included review of patient's labs, vitals, and management of their comorbidities with continued monitoring, examination of patient as well as answering patient questions, documenting her findings and " creating progress note in electronic medical record,  ordering appropriate diagnostic testing. Medical decision making for the day was made by supervising physician unless otherwise noted in their attestation statement.    ** Please Note: Fluency Direct voice to text software may have been used in the creation of this document. Audio transcription errors may occur**

## 2024-02-28 NOTE — PHYSICAL THERAPY NOTE
"                                                      Physical Therapy Evaluation    Patient Name: Delia Morin    Today's Date: 2/28/2024     Problem List  Principal Problem:    Primary osteoarthritis of left knee  Active Problems:    Primary hypertension    Acquired hypothyroidism    Gastroesophageal reflux disease    S/P total knee arthroplasty, left       Past Medical History  Past Medical History:   Diagnosis Date    Disease of thyroid gland     GERD (gastroesophageal reflux disease)     History of hepatitis B     Hyperlipidemia     Hypertension         Past Surgical History  Past Surgical History:   Procedure Laterality Date    ABCESS DRAINAGE      tonsil at age 3    COLONOSCOPY             02/28/24 1532   PT Last Visit   PT Visit Date 02/28/24   Note Type   Note type Evaluation   Pain Assessment   Pain Assessment Tool 0-10   Pain Score No Pain  (c/o of stiffness and \"cramping\" in superior patellar region but denies pain at this time.)   Restrictions/Precautions   Weight Bearing Precautions Per Order Yes   LLE Weight Bearing Per Order WBAT   Other Precautions Multiple lines;Fall Risk;Pain  (+L TKA, hemovac)   Home Living   Type of Home House  (2SH)   Home Layout Two level;Able to live on main level with bedroom/bathroom  (2 BEN)   Bathroom Shower/Tub Walk-in shower   Bathroom Toilet Raised   Bathroom Equipment Built-in shower seat   Bathroom Accessibility Accessible   Prior Function   Level of [place] Independent with ADLs;Independent with functional mobility;Independent with IADLS   Lives With Spouse   Receives Help From Family;Friend(s)   IADLs Independent with driving;Independent with meal prep;Independent with medication management   Falls in the last 6 months 0   Vocational Retired   Comments Lives at home with , both retired. Also noted having several friends that are willing and available to assist as needed   General   Family/Caregiver Present No   Cognition   Overall Cognitive Status " WFL   Arousal/Participation Cooperative   Orientation Level Oriented X4   Memory Within functional limits   Following Commands Follows all commands and directions without difficulty   RLE Assessment   RLE Assessment WFL   LLE Assessment   LLE Assessment X  (defered s/p L TKA; no buckling with weight bearing)   Light Touch   RLE Light Touch Grossly intact   LLE Light Touch Impaired   LLE Light Touch Comments impared 2/2 surgical intervention   Bed Mobility   Supine to Sit 5  Supervision   Additional items Assist x 1;LE management;Verbal cues   Additional Comments returned to bedside chair at end of IE   Transfers   Sit to Stand 4  Minimal assistance   Additional items Assist x 1;Increased time required;Verbal cues;Bedrails   Stand to Sit 4  Minimal assistance   Additional items Assist x 1;Armrests;Increased time required;Verbal cues   Additional Comments VCs for hand placement during transfers   Ambulation/Elevation   Gait pattern Improper Weight shift;Antalgic;Decreased foot clearance;Short stride;Step through pattern   Gait Assistance 4  Minimal assist   Additional items Assist x 1   Assistive Device Rolling walker   Distance 50ft x 2   Stair Management Assistance Not tested   Ambulation/Elevation Additional Comments c RW   Balance   Static Sitting Good   Dynamic Sitting Fair +   Static Standing Fair +   Dynamic Standing Fair   Ambulatory Fair -   Endurance Deficit   Endurance Deficit Yes   Endurance Deficit Description weakness and fatigue   Activity Tolerance   Activity Tolerance Patient limited by fatigue   Medical Staff Made Aware PT Moreno   Nurse Made Aware yes-cleared   Assessment   Prognosis Good   Problem List Decreased strength;Decreased endurance;Decreased mobility;Impaired sensation;Orthopedic restrictions;Pain   Assessment Pt is a 63 y.o. F, admitted on 2/28/2024, with L TKA. Symptoms  at admission consist of: pain and advanced L knee OA. Comorbidities/PMH consist of: unremarkable. Pt presentation  consistent with high complexity due to POD#0, hemovac placement, continuous monitoring, WBing restriction and pain control. Upon evaluation pt required assist levels of minAx1 t/o evaluation. Pt required min VCs for transfer 2/2 hand placement and sequencing and RW use education. The current strength and endurance deficits lead to a limitation with independent amb and prolonged WBing, which restricts her from returning to independent ADLs and IADLs. The patient's AM-PAC Basic Mobility Inpatient Short Form Raw Score is 18. A Raw score of greater than or equal to 16 suggests the patient may benefit from discharge to home. Please also refer to the recommendation of the Physical Therapist for safe discharge planning. Despite current AM-PAC scoring, pt continues to be recommended to OPPT services 2/2 s/p L TKA. Pt was left in bedside chair with alarm donned, call bell and phone within reach. Pt would benefit from skilled PT intervention to address above mentioned functional deficits to maximize their safety in home and the community, increase WBing and amb yamileth, improve functional mobility, minimize caregiver burden and maximize return to PLOF.   Barriers to Discharge None   Goals   Patient Goals To go home   STG Expiration Date 03/13/24   Short Term Goal #1 In 14 days time, pt will:  1. Increase bed mobility to independent to decrease caregiver burden and improve functional mobility.  2. Increase transfer yamileth to independent to improve functional mobility and increase independence.   3. Improve amb yamileth to over 150 ft modified independently to increase functinal mobility, increase independence and decrease caregiver burden.  4. Improve stair/elevation yamileth to a level of supervision x 7 steps to increase functional mobility and maximize independence to negotiate home and perform ADLs.  5. Increase balance by 1/2 grade to increase independence and decrease fall risk and increase safety in home/community.   PT Treatment Day 0    Plan   Treatment/Interventions Functional transfer training;LE strengthening/ROM;Elevations;Therapeutic exercise;Endurance training;Patient/family training;Equipment eval/education;Bed mobility;Gait training;Spoke to nursing   PT Frequency Twice a day   Discharge Recommendation   Rehab Resource Intensity Level, PT III (Minimum Resource Intensity)   Equipment Recommended Walker   Walker Package Recommended Wheeled walker   Change/add to Walker Package? No   AM-PAC Basic Mobility Inpatient   Turning in Flat Bed Without Bedrails 4   Lying on Back to Sitting on Edge of Flat Bed Without Bedrails 3   Moving Bed to Chair 3   Standing Up From Chair Using Arms 3   Walk in Room 3   Climb 3-5 Stairs With Railing 2   Basic Mobility Inpatient Raw Score 18   Basic Mobility Standardized Score 41.05   Highest Level Of Mobility   -HLM Goal 6: Walk 10 steps or more   JH-HLM Achieved 7: Walk 25 feet or more   Modified Henriette Scale   Modified Henriette Scale 4   Barthel Index   Feeding 10   Bathing 0   Grooming Score 5   Dressing Score 5   Bladder Score 10   Bowels Score 10   Toilet Use Score 5   Transfers (Bed/Chair) Score 10   Mobility (Level Surface) Score 10   Stairs Score 5   Barthel Index Score 70   End of Consult   Patient Position at End of Consult Bedside chair;All needs within reach     Javier Larson, SPT

## 2024-02-28 NOTE — PROGRESS NOTES
Progress Note - Orthopedics   Delia Morin 63 y.o. female MRN: [id number]  Unit/Bed#: Operating Room      Subjective:    63 y.o.female postop day 1 from a total knee arthroplasty of the left knee.  No acute events, no new complaints. Pain well controlled. Denies fevers, chills, CP, SOB, N/V, numbness or tingling.     Labs:  0   Lab Value Date/Time    HCT 40.5 02/07/2024 0816    HCT 38.6 10/04/2023 0827    HCT 41.5 10/29/2015 0914    HCT 40.7 08/07/2014 1043    HGB 13.7 02/07/2024 0816    HGB 12.9 10/04/2023 0827    HGB 14.2 10/29/2015 0914    HGB 13.2 08/07/2014 1043    INR 0.96 02/07/2024 0816    WBC 5.70 02/07/2024 0816    WBC 6.69 10/04/2023 0827    WBC 5.65 10/29/2015 0914    WBC 3.99 (L) 08/07/2014 1043       Meds:    Current Facility-Administered Medications:     acetaminophen (TYLENOL) tablet 650 mg, 650 mg, Oral, Q6H PRN, Randa Panda PA-C    calcium carbonate (TUMS) chewable tablet 1,000 mg, 1,000 mg, Oral, Daily PRN, Randa Panda PA-C    ceFAZolin (ANCEF) IVPB (premix in dextrose) 1,000 mg 50 mL, 1,000 mg, Intravenous, Once, Randa Panda PA-C    docusate sodium (COLACE) capsule 100 mg, 100 mg, Oral, BID, Randa Panda PA-C    [START ON 2/29/2024] enoxaparin (LOVENOX) subcutaneous injection 40 mg, 40 mg, Subcutaneous, Daily, Randa Panda PA-C    ezetimibe (ZETIA) tablet 10 mg, 10 mg, Oral, Daily, Randa Panda PA-C    gabapentin (NEURONTIN) capsule 100 mg, 100 mg, Oral, Q8H, Randa Panda PA-C    hydroCHLOROthiazide tablet 25 mg, 25 mg, Oral, Daily, Randa Panda PA-C    HYDROmorphone (DILAUDID) injection 0.5 mg, 0.5 mg, Intravenous, Q2H PRN, Randa Panda PA-ANNA    lactated ringers bolus 1,000 mL, 1,000 mL, Intravenous, Once PRN **AND** lactated ringers bolus 1,000 mL, 1,000 mL, Intravenous, Once PRN, Randa Panda PA-C    lactated ringers infusion, 125 mL/hr, Intravenous, Continuous, Randa Panda PA-ANNA    lactated ringers infusion, 125 mL/hr, Intravenous, Continuous, Jacob Yao PA-C, Last Rate: 125 mL/hr at  "02/28/24 1059, New Bag at 02/28/24 1226    levothyroxine tablet 75 mcg, 75 mcg, Oral, Daily, Randa Panda PA-C    methocarbamol (ROBAXIN) tablet 500 mg, 500 mg, Oral, Q6H BRIAN, Randa Panda PA-C    metoprolol succinate (TOPROL-XL) 24 hr tablet 50 mg, 50 mg, Oral, Daily, Randa Panda PA-C    ondansetron (ZOFRAN) injection 4 mg, 4 mg, Intravenous, Q6H PRN, Randa Panda PA-C    oxyCODONE (ROXICODONE) IR tablet 10 mg, 10 mg, Oral, Q4H PRN, Randa Panda PA-C    oxyCODONE (ROXICODONE) IR tablet 5 mg, 5 mg, Oral, Q4H PRN, Randa Panda PA-C    PARoxetine (PAXIL) tablet 10 mg, 10 mg, Oral, Daily, Randa Panda PA-C    sodium chloride 0.9 % bolus 1,000 mL, 1,000 mL, Intravenous, Once PRN **AND** sodium chloride 0.9 % bolus 1,000 mL, 1,000 mL, Intravenous, Once PRN, Randa Panda PA-C    sodium chloride 0.9 % irrigation solution, , , PRN, Ignacio Leblanc MD, 1,000 mL at 02/28/24 1201    Blood Culture:   No results found for: \"BLOODCX\"    Wound Culture:   No results found for: \"WOUNDCULT\"    Ins and Outs:  I/O last 24 hours:  In: 2000 [I.V.:2000]  Out: 150 [Urine:150]          Physical:  Vitals:    02/28/24 0902   BP: 128/86   Pulse: 71   Resp: 18   Temp: (!) 97.1 °F (36.2 °C)   SpO2: 93%     Musculoskeletal: left Lower Extremity  Dressing clean/dry/intact  TTP geeta-incisional  Sensation intact to saphenous, sural, tibial, superficial peroneal nerve, and deep peroneal  Motor intact to +FHL/EHL, +ankle dorsi/plantar flexion  2+ DP pulse  Digits warm and well perfused  Capillary refill < 2 seconds    Assessment:    63 y.o.female postop day 1 from left total knee arthroplasty.  Doing well today, will work with physical therapy.  Drain to be pulled today..     Plan:  Weightbearing as tolerated to left lower extremity  Will monitor for ABLA and administer IVF/prbc as indicated for Greater than 2 gram drop or Hgb < 7   PT/OT  Pain control  DVT ppx Lovenox  Medical co-morbidities are being managed per [facility] team    Judith Flores, " MD

## 2024-02-28 NOTE — PLAN OF CARE
Problem: PAIN - ADULT  Goal: Verbalizes/displays adequate comfort level or baseline comfort level  Description: Interventions:  - Encourage patient to monitor pain and request assistance  - Assess pain using appropriate pain scale  - Administer analgesics based on type and severity of pain and evaluate response  - Implement non-pharmacological measures as appropriate and evaluate response  - Consider cultural and social influences on pain and pain management  - Notify physician/advanced practitioner if interventions unsuccessful or patient reports new pain  Outcome: Progressing     Problem: INFECTION - ADULT  Goal: Absence or prevention of progression during hospitalization  Description: INTERVENTIONS:  - Assess and monitor for signs and symptoms of infection  - Monitor lab/diagnostic results  - Monitor all insertion sites, i.e. indwelling lines, tubes, and drains  - Monitor endotracheal if appropriate and nasal secretions for changes in amount and color  - [place] appropriate cooling/warming therapies per order  - Administer medications as ordered  - Instruct and encourage patient and family to use good hand hygiene technique  - Identify and instruct in appropriate isolation precautions for identified infection/condition  Outcome: Progressing     Problem: SAFETY ADULT  Goal: Patient will remain free of falls  Description: INTERVENTIONS:  - Educate patient/family on patient safety including physical limitations  - Instruct patient to call for assistance with activity   - Consult OT/PT to assist with strengthening/mobility   - Keep Call bell within reach  - Keep bed low and locked with side rails adjusted as appropriate  - Keep care items and personal belongings within reach  - Initiate and maintain comfort rounds  - Make Fall Risk Sign visible to staff  - Offer Toileting every 2-4 Hours, in advance of need  - Initiate/Maintain bed/chair alarm  - Obtain necessary fall risk management equipment: nonskid footwear    - Apply yellow socks and bracelet for high fall risk patients  - Consider moving patient to room near nurses station  Outcome: Progressing  Goal: Maintain or return to baseline ADL function  Description: INTERVENTIONS:  -  Assess patient's ability to carry out ADLs; assess patient's baseline for ADL function and identify physical deficits which impact ability to perform ADLs (bathing, care of mouth/teeth, toileting, grooming, dressing, etc.)  - Assess/evaluate cause of self-care deficits   - Assess range of motion  - Assess patient's mobility; develop plan if impaired  - Assess patient's need for assistive devices and provide as appropriate  - Encourage maximum independence but intervene and supervise when necessary  - Involve family in performance of ADLs  - Assess for home care needs following discharge   - Consider OT consult to assist with ADL evaluation and planning for discharge  - Provide patient education as appropriate  Outcome: Progressing  Goal: Maintains/Returns to pre admission functional level  Description: INTERVENTIONS:  - Perform AM-PAC 6 Click Basic Mobility/ Daily Activity assessment daily.  - Set and communicate daily mobility goal to care team and patient/family/caregiver.   - Collaborate with rehabilitation services on mobility goals if consulted  - Perform Range of Motion 3 times a day.  - Reposition patient every 2 hours.  - Dangle patient 3 times a day  - Stand patient 3 times a day  - Ambulate patient 3 times a day  - Out of bed to chair 3 times a day   - Out of bed for meals 3 times a day  - Out of bed for toileting  - Record patient progress and toleration of activity level   Outcome: Progressing     Problem: DISCHARGE PLANNING  Goal: Discharge to home or other facility with appropriate resources  Description: INTERVENTIONS:  - Identify barriers to discharge w/patient and caregiver  - Arrange for needed discharge resources and transportation as appropriate  - Identify discharge learning  needs (meds, wound care, etc.)  - Arrange for interpretive services to assist at discharge as needed  - Refer to Case Management Department for coordinating discharge planning if the patient needs post-hospital services based on physician/advanced practitioner order or complex needs related to functional status, cognitive ability, or social support system  Outcome: Progressing     Problem: Knowledge Deficit  Goal: Patient/family/caregiver demonstrates understanding of disease process, treatment plan, medications, and discharge instructions  Description: Complete learning assessment and assess knowledge base.  Interventions:  - Provide teaching at level of understanding  - Provide teaching via preferred learning methods  Outcome: Progressing

## 2024-02-28 NOTE — Clinical Note
In 14 days time, pt will:  Increase bed mobility to independent to decrease caregiver burden and improve functional mobility.  Increase transfer yamileth to independent to improve functional mobility and increase independence.   Improve amb yamileth to over 150 ft modified independently to increase functinal mobility, increase independence and decrease caregiver burden.  Improve stair/elevation yamileth to a level of supervision x 7 steps to increase functional mobility and maximize independence to negotiate home and perform ADLs.  Increase balance by 1/2 grade to increase independence and decrease fall risk and increase safety in home/community.       Pt is a 63 y.o. F, admitted on 2/28/2024, with L TKA. Symptoms  at admission consist of: pain and advanced L knee OA. Comorbidities/PMH consist of: unremarkable. Pt presentation consistent with high complexity due to POD#0, hemovac placement, continuous monitoring, WBing restriction and pain control. Upon evaluation pt required assist levels of S t/o evaluation. Pt required min VCs for transfer 2/2 hand placement and sequencing and RW use education. The current strength and endurance deficits lead to a limitation with independent amb and prolonged WBing, which restricts her from returning to independent ADLs and IADLs. The patient's AM-PAC Basic Mobility Inpatient Short Form Raw Score is 18. A Raw score of greater than or equal to 16 suggests the patient may benefit from discharge to home. Please also refer to the recommendation of the Physical Therapist for safe discharge planning. Despite current AM-PAC scoring, pt continues to be recommended to OPPT services 2/2 s/p L TKA. Pt was left in bedside chair with alarm donned, call bell and phone within reach. Pt would benefit from skilled PT intervention to address above mentioned functional deficits to maximize their safety in home and the community, increase WBing and amb yamileth, improve functional mobility, minimize caregiver burden  and maximize return to PLOF.

## 2024-02-28 NOTE — ANESTHESIA PREPROCEDURE EVALUATION
Procedure:  ARTHROPLASTY KNEE TOTAL W ROBOT (Left: Knee)    Relevant Problems   CARDIO   (+) Mixed hyperlipidemia   (+) Primary hypertension   (+) Raynaud's syndrome without gangrene   (+) SVT (supraventricular tachycardia)      ENDO   (+) Acquired hypothyroidism      GI/HEPATIC   (+) Gastroesophageal reflux disease   (+) Hepatic steatosis   (+) Hiatal hernia      MUSCULOSKELETAL   (+) Hiatal hernia   (+) Primary osteoarthritis of both knees   (+) Primary osteoarthritis of left knee      NEURO/PSYCH   (+) Single major depressive episode, in full remission (HCC)        Physical Exam    Airway    Mallampati score: II  TM Distance: >3 FB  Neck ROM: full     Dental       Cardiovascular      Pulmonary      Other Findings  post-pubertal.      Anesthesia Plan  ASA Score- 2     Anesthesia Type- spinal with ASA Monitors.         Additional Monitors:     Airway Plan:     Comment: Patient took ACE-I and HCTZ this morning despite nursing instructions to not take day of surgery. Thorough discussion with patient concerning risks of perioperative YAIMA and hypotension and she prefers to proceed..       Plan Factors-    Chart reviewed.                      Induction- intravenous.    Postoperative Plan-     Informed Consent- Anesthetic plan and risks discussed with patient.  I personally reviewed this patient with the CRNA. Discussed and agreed on the Anesthesia Plan with the CRNA..

## 2024-02-28 NOTE — PROGRESS NOTES
Progress Note - Orthopedics   Delia Morin 63 y.o. female MRN: [id number]  Unit/Bed#: -01      Subjective:    63 y.o.female POD 0 L TKA No acute events, no new complaints. Pain well controlled. Denies fevers, chills, CP, SOB, N/V, numbness or tingling. Patient reports no issues with urination or bowel movements.     Labs:  0   Lab Value Date/Time    HCT 40.5 02/07/2024 0816    HCT 38.6 10/04/2023 0827    HCT 41.5 10/29/2015 0914    HCT 40.7 08/07/2014 1043    HGB 13.7 02/07/2024 0816    HGB 12.9 10/04/2023 0827    HGB 14.2 10/29/2015 0914    HGB 13.2 08/07/2014 1043    INR 0.96 02/07/2024 0816    WBC 5.70 02/07/2024 0816    WBC 6.69 10/04/2023 0827    WBC 5.65 10/29/2015 0914    WBC 3.99 (L) 08/07/2014 1043       Meds:    Current Facility-Administered Medications:     acetaminophen (TYLENOL) tablet 975 mg, 975 mg, Oral, Q8H BRIAN, CIELO Perales, 975 mg at 02/28/24 1412    calcium carbonate (TUMS) chewable tablet 1,000 mg, 1,000 mg, Oral, Daily PRN, Randa Panda PA-C    ceFAZolin (ANCEF) IVPB (premix in dextrose) 1,000 mg 50 mL, 1,000 mg, Intravenous, Once, Randa Panda PA-C    docusate sodium (COLACE) capsule 100 mg, 100 mg, Oral, BID, Randa Panda PA-C    [START ON 2/29/2024] enoxaparin (LOVENOX) subcutaneous injection 40 mg, 40 mg, Subcutaneous, Daily, Randa Panda PA-C    [START ON 2/29/2024] ezetimibe (ZETIA) tablet 10 mg, 10 mg, Oral, Daily, Randa Panda PA-C    gabapentin (NEURONTIN) capsule 100 mg, 100 mg, Oral, TID, Amanda Dale, CRNP    hydrALAZINE (APRESOLINE) tablet 25 mg, 25 mg, Oral, Q8H PRN, MARIZA PeralesNP    HYDROmorphone (DILAUDID) injection 0.5 mg, 0.5 mg, Intravenous, Q2H PRN, Randa Ciano, PA-C    lactated ringers bolus 1,000 mL, 1,000 mL, Intravenous, Once PRN **AND** lactated ringers bolus 1,000 mL, 1,000 mL, Intravenous, Once PRN, Randa Ciano, PA-C    lactated ringers infusion, 125 mL/hr, Intravenous, Continuous, Randa Ciano, PA-C    lactated ringers infusion, 125 mL/hr,  "Intravenous, Continuous, Jacob Yao PA-C, Last Rate: 125 mL/hr at 02/28/24 1059, New Bag at 02/28/24 1226    [START ON 2/29/2024] levothyroxine tablet 75 mcg, 75 mcg, Oral, Early Morning, Randa Panda PA-C    methocarbamol (ROBAXIN) tablet 500 mg, 500 mg, Oral, Q6H PRN, Amanda Dale, CRNP, 500 mg at 02/28/24 1414    [START ON 2/29/2024] metoprolol succinate (TOPROL-XL) 24 hr tablet 50 mg, 50 mg, Oral, Daily, Randa Panda PA-C    ondansetron (ZOFRAN) injection 4 mg, 4 mg, Intravenous, Q6H PRN, Randa Panda PA-C    oxyCODONE (ROXICODONE) immediate release tablet 10 mg, 10 mg, Oral, Q4H PRN, Randa Panda PA-C    oxyCODONE (ROXICODONE) IR tablet 5 mg, 5 mg, Oral, Q4H PRN, Randa Panda PA-C    [START ON 2/29/2024] PARoxetine (PAXIL) tablet 10 mg, 10 mg, Oral, Daily, Randa Panda PA-C    sodium chloride 0.9 % bolus 1,000 mL, 1,000 mL, Intravenous, Once PRN **AND** sodium chloride 0.9 % bolus 1,000 mL, 1,000 mL, Intravenous, Once PRN, Randa Panda PA-C    Blood Culture:   No results found for: \"BLOODCX\"    Wound Culture:   No results found for: \"WOUNDCULT\"    Ins and Outs:  I/O last 24 hours:  In: 2000 [I.V.:2000]  Out: 150 [Urine:150]          Physical:  Vitals:    02/28/24 1412   BP: 117/72   Pulse: 66   Resp: 17   Temp:    SpO2: 94%     Musculoskeletal: left Lower Extremity  Dressing c/d/i  TTP Anterior knee  Drain in place with serosanguinous output  Sensation intact to saphenous, sural, tibial, superficial peroneal nerve, and deep peroneal  Motor intact to +FHL/EHL, +ankle dorsi/plantar flexion  2+ DP pulse  Digits warm and well perfused  Capillary refill < 2 seconds    Assessment:    63 y.o.female POD 0 Left TKA. Patient doing well .     Plan:  WBAT LLE  Will monitor for ABLA and administer IVF/prbc as indicated for Greater than 2 gram drop or Hgb < 7   PT/OT  Pain control  DVT ppx Lovenox  Appreciate assistance of internal medicine with medical management  Dispo: Ortho will follow    Kevin Cardenas MD      "

## 2024-02-28 NOTE — ASSESSMENT & PLAN NOTE
Hold diovan/hctz to avoid YAIMA in the post operative setting  OK to resume in 48 hours if bmp is stable or on DC  Add hydralazine as needed for SBP >160

## 2024-02-28 NOTE — OP NOTE
OPERATIVE REPORT  PATIENT NAME: Delia Morin  : 1960  MRN: [id number]  Pt Location:  [facility] OR ROOM 18    Surgery Date: 2024    Surgeons and Role:     * Ignacio Leblanc MD - Primary     * Kevin Cardenas MD - Assisting     * Randa Panda PA-C - Assisting     Preop Diagnosis:  Arthritis of left knee [M17.12]  Chronic pain of both knees [M25.561, M25.562, G89.29]    Post-Op Diagnosis Codes:     * Arthritis of left knee [M17.12]     * Chronic pain of both knees [M25.561, M25.562, G89.29]    Procedure(s):  Left - ARTHROPLASTY KNEE TOTAL W ROBOT    Specimens:  * No specimens in log *    Estimated Blood Loss:   Minimal    Drains:  Closed/Suction Drain Anterior;Left Knee Accordion 10 Fr. (Active)   Number of days: 0       Urethral Catheter Non-latex 16 Fr. (Active)   Number of days: 0       Anesthesia Type:   Choice     Operative Indications:  Arthritis of left knee [M17.12]  Chronic pain of both knees [M25.561, M25.562, G89.29]    Operative Findings:  Depuy attune   Femur-5N   Poly-10   Tibia-4   Patella-35  Bone-on-bone apposition in the patellofemoral compartment, and full-thickness cartilage defect lateral femoral condyle proximal lateral tibia that were large in size  Complications:   None    Knee Technique: Suture (direct) Repair  Knee Approach: Medial Parapatellar    Procedure and Technique:  Following duction adequate level of spinal anesthesia, a Rebolledo catheter was then sterilely introduced in this patient's bladder.  Antibiotics administered.  The left thigh was then fitted with a thigh-high tourniquet.  The left lower extremity then underwent sterile prep and drape.  The left lower extremity was exsanguinated gravity, tourniquet inflated to 300 mmHg.  A midline knee incision was carried the knee in flexion.  Full-thickness flaps were raised when accessed the extensor mechanism.  A medial arthrotomy was created open up the knee joint.  2 half pins were placed in proximal tibia, 2 pins in place  with distal femur.  In doing so, modules were created.  The arrays were then attached to the modules.  Checkpoints were in the proximal tibia distal femur.  The knee was then registered.  The surgery was planned out on the computer, the plan was finalized.  The robot was docked.  The first maneuver involve the distal femoral cut probably anterior posterior cuts.  The chamfer cuts completed the process.  The proximal tibia cut was made next.  Care was taken preserve the taken medial collateral, lateral collateral, posterior structures during these maneuvers.  The box cut was then made for the posterior stabilized unit, remnant medial and lateral discectomies and performed.  Trials were inserted, and he was taken the range of motion, found to be capable of full extension, good flexion, stable throughout.  The patella was then resurfaced while utilizing manufactures equipment, is found to be a size 35 mm button.  The trial components removed and the knee was prepared for insertion of cemented components.  The cemented tibia, cemented femur, trial poly-, cemented patella placed.  Excess cement was removed, the knee was brought into extension.  The cement was at a cure.  The trial poly was taken out, the knee was packed off.  The tourniquet was deflated, hemostasis was secured.  The insert polyethylene was then snapped into position.  The knee was taken their final range of motion, found to be A full extension, good flexion, stable throughout, and excellent patella tracking.  Satisfied with the extent of surgery, the wounds were flushed with saline and closed.  A Betadine soak initiated.  A drain is placed deep brought via cephalad stab incision.  The arthrotomy was closed with number Vicryl suture.  The subcu tissue was closed with 2-0 Vicryl suture.  The skin was opposed to staples.  Sterile dressings were applied.  She was then transferred to recovery room in stable condition plans to include physical therapy  weightbearing times, she will require DVT prophylaxis with Lovenox   I was present for the entire procedure.    Patient Disposition:  PACU             SIGNATURE: Ignacio Leblanc MD  DATE: February 28, 2024  TIME: 12:21 PM

## 2024-02-28 NOTE — PLAN OF CARE
Problem: PHYSICAL THERAPY ADULT  Goal: Performs mobility at highest level of function for planned discharge setting.  See evaluation for individualized goals.  Description: Treatment/Interventions: Functional transfer training, LE strengthening/ROM, Elevations, Therapeutic exercise, Endurance training, Patient/family training, Equipment eval/education, Bed mobility, Gait training, Spoke to nursing  Equipment Recommended: Walker       See flowsheet documentation for full assessment, interventions and recommendations.  Note: Prognosis: Good  Problem List: Decreased strength, Decreased endurance, Decreased mobility, Impaired sensation, Orthopedic restrictions, Pain  Assessment: Pt is a 63 y.o. F, admitted on 2/28/2024, with L TKA. Symptoms  at admission consist of: pain and advanced L knee OA. Comorbidities/PMH consist of: unremarkable. Pt presentation consistent with high complexity due to POD#0, hemovac placement, continuous monitoring, WBing restriction and pain control. Upon evaluation pt required assist levels of minAx1 t/o evaluation. Pt required min VCs for transfer 2/2 hand placement and sequencing and RW use education. The current strength and endurance deficits lead to a limitation with independent amb and prolonged WBing, which restricts her from returning to independent ADLs and IADLs. The patient's AM-PAC Basic Mobility Inpatient Short Form Raw Score is 18. A Raw score of greater than or equal to 16 suggests the patient may benefit from discharge to home. Please also refer to the recommendation of the Physical Therapist for safe discharge planning. Despite current AM-PAC scoring, pt continues to be recommended to OPPT services 2/2 s/p L TKA. Pt was left in bedside chair with alarm donned, call bell and phone within reach. Pt would benefit from skilled PT intervention to address above mentioned functional deficits to maximize their safety in home and the community, increase WBing and amb yamileth, improve  functional mobility, minimize caregiver burden and maximize return to PLOF.  Barriers to Discharge: None     Rehab Resource Intensity Level, PT: III (Minimum Resource Intensity)    See flowsheet documentation for full assessment.

## 2024-02-28 NOTE — PROGRESS NOTES
Progress Note - Orthopedics   Delia Morin 63 y.o. female MRN: [id number]  Unit/Bed#: PACU 14      Subjective:    63 y.o.female postop day 1 for total knee arthroplasty of the left knee.  No acute events, no new complaints. Pain well controlled. Denies fevers, chills, CP, SOB, N/V, numbness or tingling.    Labs:  0   Lab Value Date/Time    HCT 40.5 02/07/2024 0816    HCT 38.6 10/04/2023 0827    HCT 41.5 10/29/2015 0914    HCT 40.7 08/07/2014 1043    HGB 13.7 02/07/2024 0816    HGB 12.9 10/04/2023 0827    HGB 14.2 10/29/2015 0914    HGB 13.2 08/07/2014 1043    INR 0.96 02/07/2024 0816    WBC 5.70 02/07/2024 0816    WBC 6.69 10/04/2023 0827    WBC 5.65 10/29/2015 0914    WBC 3.99 (L) 08/07/2014 1043       Meds:    Current Facility-Administered Medications:     acetaminophen (TYLENOL) tablet 650 mg, 650 mg, Oral, Q6H PRN, Randa Panda PA-C    calcium carbonate (TUMS) chewable tablet 1,000 mg, 1,000 mg, Oral, Daily PRN, Randa Panda PA-C    ceFAZolin (ANCEF) IVPB (premix in dextrose) 1,000 mg 50 mL, 1,000 mg, Intravenous, Once, Randa Panda PA-C    docusate sodium (COLACE) capsule 100 mg, 100 mg, Oral, BID, Randa Panda PA-C    [START ON 2/29/2024] enoxaparin (LOVENOX) subcutaneous injection 40 mg, 40 mg, Subcutaneous, Daily, Randa Panda PA-C    ezetimibe (ZETIA) tablet 10 mg, 10 mg, Oral, Daily, Randa Panda PA-C    gabapentin (NEURONTIN) capsule 100 mg, 100 mg, Oral, Q8H, Randa Panda PA-C    hydroCHLOROthiazide tablet 25 mg, 25 mg, Oral, Daily, Randa Panda PA-C    HYDROmorphone (DILAUDID) injection 0.5 mg, 0.5 mg, Intravenous, Q2H PRN, Randa Panda PA-C    HYDROmorphone (DILAUDID) injection 0.5 mg, 0.5 mg, Intravenous, Q5 Min PRN, Win Rosales MD    lactated ringers bolus 1,000 mL, 1,000 mL, Intravenous, Once PRN **AND** lactated ringers bolus 1,000 mL, 1,000 mL, Intravenous, Once PRN, Randa Panda PA-C    lactated ringers infusion, 125 mL/hr, Intravenous, Continuous, Randa Panda PA-C    lactated  "ringers infusion, 125 mL/hr, Intravenous, Continuous, Jacob Yao PA-C, Last Rate: 125 mL/hr at 02/28/24 1059, New Bag at 02/28/24 1226    levothyroxine tablet 75 mcg, 75 mcg, Oral, Daily, Randa Panda PA-C    methocarbamol (ROBAXIN) tablet 500 mg, 500 mg, Oral, Q6H BRIAN, Randa Panda PA-C    metoclopramide (REGLAN) injection 10 mg, 10 mg, Intravenous, Once PRN, Win Rosales MD    metoprolol succinate (TOPROL-XL) 24 hr tablet 50 mg, 50 mg, Oral, Daily, Randa Panda PA-C    ondansetron (ZOFRAN) injection 4 mg, 4 mg, Intravenous, Q6H PRN, Randa Panda PA-C    ondansetron (ZOFRAN) injection 4 mg, 4 mg, Intravenous, Once PRN, Win Rosales MD    oxyCODONE (ROXICODONE) IR tablet 10 mg, 10 mg, Oral, Q4H PRN, Randa Panda PA-C    oxyCODONE (ROXICODONE) IR tablet 5 mg, 5 mg, Oral, Q4H PRN, Randa Panda PA-C    PARoxetine (PAXIL) tablet 10 mg, 10 mg, Oral, Daily, Randa Panda PA-C    sodium chloride 0.9 % bolus 1,000 mL, 1,000 mL, Intravenous, Once PRN **AND** sodium chloride 0.9 % bolus 1,000 mL, 1,000 mL, Intravenous, Once PRN, Randa Panda PA-C    Blood Culture:   No results found for: \"BLOODCX\"    Wound Culture:   No results found for: \"WOUNDCULT\"    Ins and Outs:  I/O last 24 hours:  In: 2000 [I.V.:2000]  Out: 150 [Urine:150]          Physical:  Vitals:    02/28/24 1310   BP: 109/64   Pulse: 66   Resp: 16   Temp: (!) 97.3 °F (36.3 °C)   SpO2: 96%     Musculoskeletal: left Lower Extremity  Dressing clean/dry/intact  TTP geeta-incisional  Sensation intact to saphenous, sural, tibial, superficial peroneal nerve, and deep peroneal  Motor intact to +FHL/EHL, +ankle dorsi/plantar flexion  2+ DP pulse  Digits warm and well perfused  Capillary refill < 2 seconds    Assessment:    63 y.o.female postop day 1 from a left total knee arthroplasty.  Doing well, will work with physical therapy.  Drain to be pulled today.     Plan:  Weightbearing as tolerated to left lower extremity  Will monitor for ABLA and administer " IVF/prbc as indicated for Greater than 2 gram drop or Hgb < 7   PT/OT  Pain control  DVT ppx Lovenox  Medical co-morbidities are being managed per primary team      Judith Flores MD

## 2024-02-29 VITALS
BODY MASS INDEX: 30.53 KG/M2 | RESPIRATION RATE: 16 BRPM | HEART RATE: 81 BPM | HEIGHT: 66 IN | OXYGEN SATURATION: 91 % | DIASTOLIC BLOOD PRESSURE: 68 MMHG | TEMPERATURE: 98.2 F | SYSTOLIC BLOOD PRESSURE: 111 MMHG | WEIGHT: 190 LBS

## 2024-02-29 PROBLEM — D62 ACUTE BLOOD LOSS ANEMIA: Status: ACTIVE | Noted: 2024-02-29

## 2024-02-29 PROBLEM — D72.829 LEUKOCYTOSIS: Status: ACTIVE | Noted: 2024-02-29

## 2024-02-29 LAB
ANION GAP SERPL CALCULATED.3IONS-SCNC: 9 MMOL/L
BUN SERPL-MCNC: 13 MG/DL (ref 5–25)
CALCIUM SERPL-MCNC: 8.9 MG/DL (ref 8.4–10.2)
CHLORIDE SERPL-SCNC: 101 MMOL/L (ref 96–108)
CO2 SERPL-SCNC: 27 MMOL/L (ref 21–32)
CREAT SERPL-MCNC: 0.96 MG/DL (ref 0.6–1.3)
ERYTHROCYTE [DISTWIDTH] IN BLOOD BY AUTOMATED COUNT: 13 % (ref 11.6–15.1)
GFR SERPL CREATININE-BSD FRML MDRD: 62 ML/MIN/1.73SQ M
GLUCOSE P FAST SERPL-MCNC: 105 MG/DL (ref 65–99)
GLUCOSE SERPL-MCNC: 105 MG/DL (ref 65–140)
HCT VFR BLD AUTO: 31.6 % (ref 34.8–46.1)
HGB BLD-MCNC: 10.4 G/DL (ref 11.5–15.4)
MCH RBC QN AUTO: 35.6 PG (ref 26.8–34.3)
MCHC RBC AUTO-ENTMCNC: 32.9 G/DL (ref 31.4–37.4)
MCV RBC AUTO: 108 FL (ref 82–98)
PLATELET # BLD AUTO: 262 THOUSANDS/UL (ref 149–390)
PMV BLD AUTO: 9.9 FL (ref 8.9–12.7)
POTASSIUM SERPL-SCNC: 3.6 MMOL/L (ref 3.5–5.3)
RBC # BLD AUTO: 2.92 MILLION/UL (ref 3.81–5.12)
SODIUM SERPL-SCNC: 137 MMOL/L (ref 135–147)
WBC # BLD AUTO: 12.04 THOUSAND/UL (ref 4.31–10.16)

## 2024-02-29 PROCEDURE — 99024 POSTOP FOLLOW-UP VISIT: CPT | Performed by: ORTHOPAEDIC SURGERY

## 2024-02-29 PROCEDURE — 97166 OT EVAL MOD COMPLEX 45 MIN: CPT

## 2024-02-29 PROCEDURE — 99232 SBSQ HOSP IP/OBS MODERATE 35: CPT | Performed by: INTERNAL MEDICINE

## 2024-02-29 PROCEDURE — 80048 BASIC METABOLIC PNL TOTAL CA: CPT

## 2024-02-29 PROCEDURE — 97530 THERAPEUTIC ACTIVITIES: CPT

## 2024-02-29 PROCEDURE — 85027 COMPLETE CBC AUTOMATED: CPT

## 2024-02-29 PROCEDURE — NC001 PR NO CHARGE: Performed by: ORTHOPAEDIC SURGERY

## 2024-02-29 PROCEDURE — 97116 GAIT TRAINING THERAPY: CPT

## 2024-02-29 RX ADMIN — SODIUM CHLORIDE, SODIUM LACTATE, POTASSIUM CHLORIDE, AND CALCIUM CHLORIDE 125 ML/HR: .6; .31; .03; .02 INJECTION, SOLUTION INTRAVENOUS at 00:00

## 2024-02-29 RX ADMIN — METHOCARBAMOL 500 MG: 500 TABLET ORAL at 02:31

## 2024-02-29 RX ADMIN — ACETAMINOPHEN 975 MG: 325 TABLET, FILM COATED ORAL at 05:28

## 2024-02-29 RX ADMIN — LEVOTHYROXINE SODIUM 75 MCG: 75 TABLET ORAL at 05:28

## 2024-02-29 RX ADMIN — DOCUSATE SODIUM 100 MG: 100 CAPSULE, LIQUID FILLED ORAL at 08:14

## 2024-02-29 RX ADMIN — IRON SUCROSE 200 MG: 20 INJECTION, SOLUTION INTRAVENOUS at 09:49

## 2024-02-29 RX ADMIN — PAROXETINE HYDROCHLORIDE HEMIHYDRATE 10 MG: 10 TABLET, FILM COATED ORAL at 08:15

## 2024-02-29 RX ADMIN — ENOXAPARIN SODIUM 40 MG: 40 INJECTION SUBCUTANEOUS at 11:03

## 2024-02-29 RX ADMIN — EZETIMIBE 10 MG: 10 TABLET ORAL at 08:14

## 2024-02-29 RX ADMIN — OXYCODONE HYDROCHLORIDE 10 MG: 10 TABLET ORAL at 02:31

## 2024-02-29 RX ADMIN — GABAPENTIN 100 MG: 100 CAPSULE ORAL at 08:14

## 2024-02-29 RX ADMIN — OXYCODONE HYDROCHLORIDE 10 MG: 10 TABLET ORAL at 08:13

## 2024-02-29 NOTE — PLAN OF CARE
Problem: PAIN - ADULT  Goal: Verbalizes/displays adequate comfort level or baseline comfort level  Description: Interventions:  - Encourage patient to monitor pain and request assistance  - Assess pain using appropriate pain scale  - Administer analgesics based on type and severity of pain and evaluate response  - Implement non-pharmacological measures as appropriate and evaluate response  - Consider cultural and social influences on pain and pain management  - Notify physician/advanced practitioner if interventions unsuccessful or patient reports new pain  Outcome: Progressing     Problem: INFECTION - ADULT  Goal: Absence or prevention of progression during hospitalization  Description: INTERVENTIONS:  - Assess and monitor for signs and symptoms of infection  - Monitor lab/diagnostic results  - Monitor all insertion sites, i.e. indwelling lines, tubes, and drains  - Monitor endotracheal if appropriate and nasal secretions for changes in amount and color  - [place] appropriate cooling/warming therapies per order  - Administer medications as ordered  - Instruct and encourage patient and family to use good hand hygiene technique  - Identify and instruct in appropriate isolation precautions for identified infection/condition  Outcome: Progressing     Problem: SAFETY ADULT  Goal: Patient will remain free of falls  Description: INTERVENTIONS:  - Educate patient/family on patient safety including physical limitations  - Instruct patient to call for assistance with activity   - Consult OT/PT to assist with strengthening/mobility   - Keep Call bell within reach  - Keep bed low and locked with side rails adjusted as appropriate  - Keep care items and personal belongings within reach  - Initiate and maintain comfort rounds  - Make Fall Risk Sign visible to staff  - Offer Toileting every 2 Hours, in advance of need  - Initiate/Maintain 24/7 alarm  - Obtain necessary fall risk management equipment: rw  - Apply yellow socks  and bracelet for high fall risk patients  - Consider moving patient to room near nurses station  Outcome: Progressing

## 2024-02-29 NOTE — ASSESSMENT & PLAN NOTE
Failed outpatient conservative measures and opted for arthroplasty  Pain controlled  No post operative issues noted  Continue encourage incentive spirometry; monitor fever curve  DVT prophylaxis in place and reviewed  Results from last 7 days   Lab Units 02/29/24  0535   WBC Thousand/uL 12.04*   HEMOGLOBIN g/dL 10.4*   HEMATOCRIT % 31.6*   PLATELETS Thousands/uL 262

## 2024-02-29 NOTE — PROGRESS NOTES
Peripheral Nerve Block Follow-up Note - Acute Pain Service    Delia Morin 63 y.o. female MRN: [id number]  Unit/Bed#: -01 Encounter: [id number]      Assessment:   Principal Problem:    Primary osteoarthritis of left knee  Active Problems:    Primary hypertension    Acquired hypothyroidism    Gastroesophageal reflux disease    S/P total knee arthroplasty, left    Acute blood loss anemia    Leukocytosis    Delia Morin is a 63 y.o. year old female who has PMH of OA s/p L Knee TKA on 2/28/24 with Dr. Leblanc. Patient received a L adductor, L Ipack and spinal block single shot nerve blocks.   On my assessment this morning the patient was sitting up in a chair, rating her pain a 5/10 and is excited to go home.     Plan:   - Left adductor and Left Ipack as well as spinal  block has resolved appropriately with no residual deficits  - Recommend standard oral opioid regimen with oxycodone 5 mg/10 mg PO q4hrs PRN for moderate/severe pain, Dilaudid 0.5 mg IV q2hrs PRN for breakthrough pain    - Multimodal analgesia with:  - Tylenol 975 mg PO q8hrs standing  - Robaxin 500 mg Q6 hrs PRN   - Gabapentin 100 mg TID     APS will sign off at this time. Thank you for the consult. All opioids and other analgesics to be written at discretion of primary team. Please contact Acute Pain Service - [facility] via [facility] from [phone] with additional questions or concerns. See [facility] or Vickie for additional contacts and after hours information.    Pain History  Current pain location(s): left knee   Pain Scale:   5/10  Quality: aching, tight   24 hour history: s/p L TKA     Opioid requirement previous 24 hours:   100 mcg IV fentanyl  30 mg oxycodone     Meds/Allergies   all current active meds have been reviewed    Allergies   Allergen Reactions    Amlodipine Swelling and Other (See Comments)     Ankle swelling      Atorvastatin Cough and Myalgia     Cough    Penicillins Rash    Sulfa Antibiotics Rash       Objective     Temp:   [97.1 °F (36.2 °C)-98.6 °F (37 °C)] 98.2 °F (36.8 °C)  HR:  [64-82] 81  Resp:  [15-17] 16  BP: ()/(50-72) 111/68    Physical Exam  Constitutional:       Appearance: Normal appearance.   HENT:      Head: Normocephalic and atraumatic.      Right Ear: External ear normal.      Left Ear: External ear normal.      Nose: Nose normal.      Mouth/Throat:      Mouth: Mucous membranes are moist.      Pharynx: Oropharynx is clear.   Eyes:      Conjunctiva/sclera: Conjunctivae normal.      Pupils: Pupils are equal, round, and reactive to light.   Cardiovascular:      Rate and Rhythm: Normal rate and regular rhythm.      Pulses: Normal pulses.      Heart sounds: Normal heart sounds.   Pulmonary:      Effort: Pulmonary effort is normal.      Breath sounds: Normal breath sounds.   Abdominal:      General: Bowel sounds are normal.      Palpations: Abdomen is soft.   Musculoskeletal:         General: Normal range of motion.      Cervical back: Normal range of motion.      Comments: LLE limited ROM due to recent sx intervention    Skin:     General: Skin is warm and dry.      Capillary Refill: Capillary refill takes less than 2 seconds.      Comments: L knee midline incision with staples, clean/dry   Neurological:      General: No focal deficit present.      Mental Status: She is alert and oriented to person, place, and time.   Psychiatric:         Behavior: Behavior normal.         Thought Content: Thought content normal.           Lab Results:   Results from last 7 days   Lab Units 02/29/24  0535   WBC Thousand/uL 12.04*   HEMOGLOBIN g/dL 10.4*   HEMATOCRIT % 31.6*   PLATELETS Thousands/uL 262      Results from last 7 days   Lab Units 02/29/24  0535   POTASSIUM mmol/L 3.6   CHLORIDE mmol/L 101   CO2 mmol/L 27   BUN mg/dL 13   CREATININE mg/dL 0.96   CALCIUM mg/dL 8.9       Imaging Studies: I have personally reviewed pertinent reports.    EKG, Pathology, and Other Studies: I have personally reviewed pertinent reports.       Counseling / Coordination of Care  Total floor / unit time spent today 15 minutes. Greater than 50% of total time was spent with the patient and / or family counseling and / or coordination of care. A description of the counseling / coordination of care: s/p nerve blocks f/u Left TKA    Please note that the APS provides consultative services regarding pain management only.  With the exception of ketamine, peripheral nerve catheters, and epidural infusions (and except when indicated), final decisions regarding starting or changing doses of analgesic medications are at the discretion of the consulting service.  Off hours consultation and/or medication management is generally not available.    CIELO Singh  Acute Pain Service

## 2024-02-29 NOTE — ASSESSMENT & PLAN NOTE
Decrease in hgb levels from preop labs results; suspect due to post operation extravasation losses along with potential dilutional effects of fluids  Initiate surgery optimization venofer protocol/transfusion  Transfuse PRBC if hgb less then 8.0 (per ortho protocol)  or symptomatic  Monitor follow up CBC post intervention to trend effec

## 2024-02-29 NOTE — DISCHARGE SUMMARY
ORTHOPEDICS DISCHARGE SUMMARY  Delia Morin 63 y.o. female MRN: [id number]  Unit/Bed#: -01    Attending Physician: Benji    Admitting diagnosis: Arthritis of left knee [M17.12]  Chronic pain of both knees [M25.561, M25.562, G89.29]    Discharge diagnosis: Arthritis of left knee [M17.12]  Chronic pain of both knees [M25.561, M25.562, G89.29]    Date of admission: 2/28/2024    Date of discharge: 02/29/24         Procedure: left total knee arthroplasty    HPI:  This is a 63 y.o. year old female that presented to the office with signs and symptoms of left knee osteoarthritis. They tried and failed conservative treatment measures and wished to proceed with surgical intervention. The risks, benefits, and complications of the procedure were discussed with the patient and informed consent was obtained.    Hospital Course:  The patient was admitted to the hospital on 2/28/2024 and underwent an uncomplicated left total knee arthroplasty. They were transferred to the floor after a brief stay in the post-anesthesia care unit. Their pain was well managed with IV and oral pain medications. They began therapy on post operative day #1. Lovenox was also started for DVT prophylaxis 12 hours post operatively.  Hemovac drain was removed on POD1.  On discharge date pt was cleared by PT and the medicine team and determined to be safe for discharge.  Daily discussion was had with the patient, nursing staff, orthopaedic team, and family members if present.  All questions were answered to the patients satisifaction.      0   Lab Value Date/Time    HGB 10.4 (L) 02/29/2024 0535    HGB 13.7 02/07/2024 0816    HGB 12.9 10/04/2023 0827    HGB 14.2 10/29/2015 0914    HGB 13.2 08/07/2014 1043       Greater than 2 gram drop which qualifies for diagnosis of acute blood loss anemia.  Vital signs remained stable and pt was resuscitated with IVF as needed   Body mass index is 30.67 kg/m². moderately obese. Recommend behavior modifications,  nutrition, and physical activity.    Discharge Instructions:  The patient was discharged weight bearing as tolerated to the left lower extremity. Lovenox will be continued for 28 days. Continue PT/OT. Take pain medications as instructed.    Discharge Medications:  For the complete list of discharge medications, please refer to the patient's medication reconciliation.

## 2024-02-29 NOTE — PROGRESS NOTES
Progress Note - Orthopedics   Delia Morin 63 y.o. female MRN: [id number]  Unit/Bed#: -01      Subjective:    63 y.o.female POD 1 L TKA. No acute events, no new complaints. Pain well controlled. Denies numbness or tingling to left lower extremity. Was able to work with PT yesterday, rec home. Hgb pending this AM, was 13.7 pre-operatively. HV with 180/305 cc output 12/24 hours.    Labs:  0   Lab Value Date/Time    HCT 40.5 02/07/2024 0816    HCT 38.6 10/04/2023 0827    HCT 41.5 10/29/2015 0914    HCT 40.7 08/07/2014 1043    HGB 13.7 02/07/2024 0816    HGB 12.9 10/04/2023 0827    HGB 14.2 10/29/2015 0914    HGB 13.2 08/07/2014 1043    INR 0.96 02/07/2024 0816    WBC 5.70 02/07/2024 0816    WBC 6.69 10/04/2023 0827    WBC 5.65 10/29/2015 0914    WBC 3.99 (L) 08/07/2014 1043       Meds:    Current Facility-Administered Medications:     acetaminophen (TYLENOL) tablet 975 mg, 975 mg, Oral, Q8H BRIAN, Amanda Dale, CRNP, 975 mg at 02/28/24 1412    calcium carbonate (TUMS) chewable tablet 1,000 mg, 1,000 mg, Oral, Daily PRN, Randa Panda PA-C    ceFAZolin (ANCEF) IVPB (premix in dextrose) 1,000 mg 50 mL, 1,000 mg, Intravenous, Once, Randa Panda PA-C    docusate sodium (COLACE) capsule 100 mg, 100 mg, Oral, BID, Randa Panda PA-C    [START ON 2/29/2024] enoxaparin (LOVENOX) subcutaneous injection 40 mg, 40 mg, Subcutaneous, Daily, Randa Panda PA-C    [START ON 2/29/2024] ezetimibe (ZETIA) tablet 10 mg, 10 mg, Oral, Daily, Randa Panda PA-C    gabapentin (NEURONTIN) capsule 100 mg, 100 mg, Oral, TID, Amanda Dale, CRNP, 100 mg at 02/28/24 1603    hydrALAZINE (APRESOLINE) tablet 25 mg, 25 mg, Oral, Q8H PRN, Amanda Dale, CRNP    HYDROmorphone (DILAUDID) injection 0.5 mg, 0.5 mg, Intravenous, Q2H PRN, Randa Panda PA-C    lactated ringers bolus 1,000 mL, 1,000 mL, Intravenous, Once PRN **AND** lactated ringers bolus 1,000 mL, 1,000 mL, Intravenous, Once PRN, Randa Panda PA-C    lactated ringers infusion, 125 mL/hr,  "Intravenous, Continuous, Randa Panda PA-C    lactated ringers infusion, 125 mL/hr, Intravenous, Continuous, Jacob Yao PA-C, Last Rate: 125 mL/hr at 02/28/24 1059, New Bag at 02/28/24 1226    [START ON 2/29/2024] levothyroxine tablet 75 mcg, 75 mcg, Oral, Early Morning, Randa Panda PA-C    methocarbamol (ROBAXIN) tablet 500 mg, 500 mg, Oral, Q6H PRN, Amanda Dale, CRNP, 500 mg at 02/28/24 1414    [START ON 2/29/2024] metoprolol succinate (TOPROL-XL) 24 hr tablet 50 mg, 50 mg, Oral, Daily, Randa Panda PA-C    ondansetron (ZOFRAN) injection 4 mg, 4 mg, Intravenous, Q6H PRN, Randa Panda PA-C    oxyCODONE (ROXICODONE) immediate release tablet 10 mg, 10 mg, Oral, Q4H PRN, Randa Panda PA-C, 10 mg at 02/28/24 1603    oxyCODONE (ROXICODONE) IR tablet 5 mg, 5 mg, Oral, Q4H PRN, Randa Panda PA-C    [START ON 2/29/2024] PARoxetine (PAXIL) tablet 10 mg, 10 mg, Oral, Daily, Randa Panda PA-C    sodium chloride 0.9 % bolus 1,000 mL, 1,000 mL, Intravenous, Once PRN **AND** sodium chloride 0.9 % bolus 1,000 mL, 1,000 mL, Intravenous, Once PRN, Randa Panda PA-C    Blood Culture:   No results found for: \"BLOODCX\"    Wound Culture:   No results found for: \"WOUNDCULT\"    Ins and Outs:  I/O last 24 hours:  In: 2000 [I.V.:2000]  Out: 275 [Urine:150; Drains:125]          Physical:  Vitals:    02/28/24 1923   BP: 117/72   Pulse: 78   Resp: 16   Temp:    SpO2: 91%     Musculoskeletal: left Lower Extremity  Dressing C/D/I without strike thru  TTP geeta incisionally  Drain in place with serosanguinous output  Sensation intact to saphenous, sural, tibial, superficial peroneal nerve, and deep peroneal  Motor intact to +FHL/EHL, +ankle dorsi/plantar flexion  2+ DP pulse  Digits warm and well perfused  Capillary refill < 2 seconds    Assessment:    63 y.o.female POD 1 Left TKA. Patient doing well .     Plan:  WBAT LLE  Will monitor for ABLA and administer IVF/prbc as indicated for Greater than 2 gram drop or Hgb < 7   PT/OT  Pain " control  DVT ppx Lovenox  Appreciate assistance of internal medicine with medical management  Dispo: pending AM labs, PT recs, drain pull, medical clearance    Radha Reed MD

## 2024-02-29 NOTE — PROGRESS NOTES
[facility]  Progress Note  Name: Delia Morin I  MRN: [id number]  Unit/Bed#: -01 I Date of Admission: 2/28/2024   Date of Service: 2/29/2024 I Hospital Day: 0    Assessment/Plan   S/P total knee arthroplasty, left  Assessment & Plan  Failed outpatient conservative measures and opted for arthroplasty  Pain controlled  No post operative issues noted  Continue encourage incentive spirometry; monitor fever curve  DVT prophylaxis in place and reviewed  Results from last 7 days   Lab Units 02/29/24  0535   WBC Thousand/uL 12.04*   HEMOGLOBIN g/dL 10.4*   HEMATOCRIT % 31.6*   PLATELETS Thousands/uL 262         Leukocytosis  Assessment & Plan  Mild. Likely reactive. No signs of infx.    Acute blood loss anemia  Assessment & Plan  Decrease in hgb levels from preop labs results; suspect due to post operation extravasation losses along with potential dilutional effects of fluids  Initiate surgery optimization venofer protocol/transfusion  Transfuse PRBC if hgb less then 8.0 (per ortho protocol)  or symptomatic  Monitor follow up CBC post intervention to trend effec      Gastroesophageal reflux disease  Assessment & Plan  Cont PPI  Monitor for nausea/vomiting      Acquired hypothyroidism  Assessment & Plan  Cont levothyroxine as prescribed        Primary hypertension  Assessment & Plan  Hold diovan/hctz to avoid YAIMA in the post operative setting  OK to resume in 48 hours if bmp is stable or on DC  Add hydralazine as needed for SBP >160               The above assessment and plan was reviewed and updated as determined by my evaluation of the patient on 2/29/2024.    Labs:   Results from last 7 days   Lab Units 02/29/24  0535   WBC Thousand/uL 12.04*   HEMOGLOBIN g/dL 10.4*   HEMATOCRIT % 31.6*   PLATELETS Thousands/uL 262     Results from last 7 days   Lab Units 02/29/24  0535   SODIUM mmol/L 137   POTASSIUM mmol/L 3.6   CHLORIDE mmol/L 101   CO2 mmol/L 27   BUN mg/dL 13    CREATININE mg/dL 0.96   CALCIUM mg/dL 8.9             Results from last 7 days   Lab Units 02/28/24  1304   POC GLUCOSE mg/dl 121       Imaging  No orders to display       Review of Scheduled Meds:  Current Facility-Administered Medications   Medication Dose Route Frequency Provider Last Rate    acetaminophen  975 mg Oral Q8H BRIAN Amandarodrigo Dale, CRNP      calcium carbonate  1,000 mg Oral Daily PRN Randa Panda PA-C      docusate sodium  100 mg Oral BID Randa Panda, PA-C      enoxaparin  40 mg Subcutaneous Daily Randa Panda, PA-C      ezetimibe  10 mg Oral Daily Randa Panda, PA-C      gabapentin  100 mg Oral TID Amanda Chito, CIELO      hydrALAZINE  25 mg Oral Q8H PRN Amanda Dale, CIELO      HYDROmorphone  0.5 mg Intravenous Q2H PRN Randa Panda, PA-C      iron sucrose  200 mg Intravenous Daily WILMAR Cutler-C      lactated ringers  1,000 mL Intravenous Once PRN WILMAR Hugo-C      And    lactated ringers  1,000 mL Intravenous Once PRN Randa Panda PA-C      lactated ringers  125 mL/hr Intravenous Continuous WILMAR Hugo-C      lactated ringers  125 mL/hr Intravenous Continuous WILMAR Vale-C 125 mL/hr (02/29/24 0000)    levothyroxine  75 mcg Oral Early Morning WILMAR Hugo-C      methocarbamol  500 mg Oral Q6H PRN CIELO Perales      metoprolol succinate  50 mg Oral Daily Randa Panda PA-C      ondansetron  4 mg Intravenous Q6H PRN Randa Panda, PA-C      oxyCODONE  10 mg Oral Q4H PRN Randa Panda, PA-C      oxyCODONE  5 mg Oral Q4H PRN Randa Panda, PA-C      PARoxetine  10 mg Oral Daily Randa Panda, PA-C      sodium chloride  1,000 mL Intravenous Once PRN Randa Panda, PA-C      And    sodium chloride  1,000 mL Intravenous Once PRN Randa Panda, PA-C         Subjective/ HPI: Patient seen and examined. Patients overnight issues or events were reviewed with nursing staff. New or overnight issues include the following:     Pt seen in her room. She states that she is doing well. She denies any  current complaints.    ROS:   A 10 point ROS was performed; negative except as noted above.        *Labs /Radiology studies Reviewed  *Medications  reviewed and reconciled as needed  *Please refer to order section for additional ordered labs studies      Physical Examination:  Vitals:   Vitals:    02/28/24 1656 02/28/24 1923 02/28/24 2356 02/29/24 0730   BP: 103/63 117/72 101/65 111/68   BP Location: Right arm Right arm Right arm    Pulse: 82 78 81 81   Resp: 16 16 16 16   Temp:   98.6 °F (37 °C) 98.2 °F (36.8 °C)   TempSrc:   Oral Oral   SpO2: 92% 91%     Weight:       Height:           General Appearance: NAD; pleasant  HEENT: PERRLA, conjuctiva normal; mucous membranes moist; face symmetrical  Neck:  Supple  Lungs: clear bilaterally, normal respiratory effort, no retractions, expiratory effort normal, on room air  CV: regular rate and rhythm, no murmurs rubs or gallops noted   ABD: soft non tender, +BS x4  EXT: DP pulses intact, no lymphadenopathy, no edema, Lt knee dressed.  Skin: normal turgor, normal texture, no rash  Psych: affect normal, mood normal  Neuro: AAOx3       The above physical exam was reviewed and updated as determined by my evaluation of the patient on 2/29/2024.    Invasive Devices       Peripheral Intravenous Line  Duration             Peripheral IV 02/28/24 Right Hand <1 day              Drain  Duration             Closed/Suction Drain Anterior;Left Knee Accordion 10 Fr. <1 day                       VTE Pharmacologic Prophylaxis: Enoxaparin  Code Status: Level 1 - Full Code  Current Length of Stay: 0 day(s)    Total floor / unit time spent today 30 minutes  Coordination of patient's care was performed in conjunction with primary service. Time invested included review of patient's labs, vitals, and management of their comorbidities with continued monitoring, examination of patient as well as answering patient questions, documenting her findings and creating progress note in electronic medical  record,  ordering appropriate diagnostic testing.       ** Please Note:  voice to text software may have been used in the creation of this document. Although proof errors in transcription or interpretation are a potential of such software**

## 2024-02-29 NOTE — PLAN OF CARE
Problem: PAIN - ADULT  Goal: Verbalizes/displays adequate comfort level or baseline comfort level  Description: Interventions:  - Encourage patient to monitor pain and request assistance  - Assess pain using appropriate pain scale  - Administer analgesics based on type and severity of pain and evaluate response  - Implement non-pharmacological measures as appropriate and evaluate response  - Consider cultural and social influences on pain and pain management  - Notify physician/advanced practitioner if interventions unsuccessful or patient reports new pain  Outcome: Adequate for Discharge     Problem: INFECTION - ADULT  Goal: Absence or prevention of progression during hospitalization  Description: INTERVENTIONS:  - Assess and monitor for signs and symptoms of infection  - Monitor lab/diagnostic results  - Monitor all insertion sites, i.e. indwelling lines, tubes, and drains  - Monitor endotracheal if appropriate and nasal secretions for changes in amount and color  - [place] appropriate cooling/warming therapies per order  - Administer medications as ordered  - Instruct and encourage patient and family to use good hand hygiene technique  - Identify and instruct in appropriate isolation precautions for identified infection/condition  Outcome: Adequate for Discharge     Problem: SAFETY ADULT  Goal: Patient will remain free of falls  Description: INTERVENTIONS:  - Educate patient/family on patient safety including physical limitations  - Instruct patient to call for assistance with activity   - Consult OT/PT to assist with strengthening/mobility   - Keep Call bell within reach  - Keep bed low and locked with side rails adjusted as appropriate  - Keep care items and personal belongings within reach  - Initiate and maintain comfort rounds  - Make Fall Risk Sign visible to staff  - Offer Toileting every 2-4 Hours, in advance of need  - Initiate/Maintain bed/chair alarm  - Obtain necessary fall risk management  equipment: nonskid footwear   - Apply yellow socks and bracelet for high fall risk patients  - Consider moving patient to room near nurses station  Outcome: Adequate for Discharge  Goal: Maintain or return to baseline ADL function  Description: INTERVENTIONS:  -  Assess patient's ability to carry out ADLs; assess patient's baseline for ADL function and identify physical deficits which impact ability to perform ADLs (bathing, care of mouth/teeth, toileting, grooming, dressing, etc.)  - Assess/evaluate cause of self-care deficits   - Assess range of motion  - Assess patient's mobility; develop plan if impaired  - Assess patient's need for assistive devices and provide as appropriate  - Encourage maximum independence but intervene and supervise when necessary  - Involve family in performance of ADLs  - Assess for home care needs following discharge   - Consider OT consult to assist with ADL evaluation and planning for discharge  - Provide patient education as appropriate  Outcome: Adequate for Discharge  Goal: Maintains/Returns to pre admission functional level  Description: INTERVENTIONS:  - Perform AM-PAC 6 Click Basic Mobility/ Daily Activity assessment daily.  - Set and communicate daily mobility goal to care team and patient/family/caregiver.   - Collaborate with rehabilitation services on mobility goals if consulted  - Perform Range of Motion 3 times a day.  - Reposition patient every 2 hours.  - Dangle patient 3 times a day  - Stand patient 3 times a day  - Ambulate patient 3 times a day  - Out of bed to chair 3 times a day   - Out of bed for meals 3 times a day  - Out of bed for toileting  - Record patient progress and toleration of activity level   Outcome: Adequate for Discharge     Problem: DISCHARGE PLANNING  Goal: Discharge to home or other facility with appropriate resources  Description: INTERVENTIONS:  - Identify barriers to discharge w/patient and caregiver  - Arrange for needed discharge resources and  transportation as appropriate  - Identify discharge learning needs (meds, wound care, etc.)  - Arrange for interpretive services to assist at discharge as needed  - Refer to Case Management Department for coordinating discharge planning if the patient needs post-hospital services based on physician/advanced practitioner order or complex needs related to functional status, cognitive ability, or social support system  Outcome: Adequate for Discharge     Problem: Knowledge Deficit  Goal: Patient/family/caregiver demonstrates understanding of disease process, treatment plan, medications, and discharge instructions  Description: Complete learning assessment and assess knowledge base.  Interventions:  - Provide teaching at level of understanding  - Provide teaching via preferred learning methods  Outcome: Adequate for Discharge

## 2024-02-29 NOTE — OCCUPATIONAL THERAPY NOTE
Occupational Therapy Evaluation     Patient Name: Delia Morin  Today's Date: 2/29/2024  Problem List  Principal Problem:    Primary osteoarthritis of left knee  Active Problems:    Primary hypertension    Acquired hypothyroidism    Gastroesophageal reflux disease    S/P total knee arthroplasty, left    Past Medical History  Past Medical History:   Diagnosis Date    Disease of thyroid gland     GERD (gastroesophageal reflux disease)     History of hepatitis B     Hyperlipidemia     Hypertension      Past Surgical History  Past Surgical History:   Procedure Laterality Date    ABCESS DRAINAGE      tonsil at age 3    COLONOSCOPY           02/29/24 0833   OT Last Visit   OT Visit Date 02/29/24   Note Type   Note type Evaluation   Pain Assessment   Pain Assessment Tool 0-10   Pain Score 7   Pain Location/Orientation Orientation: Left;Location: Knee   Patient's Stated Pain Goal No pain   Hospital Pain Intervention(s) Repositioned;Ambulation/increased activity;Emotional support   Restrictions/Precautions   Weight Bearing Precautions Per Order Yes   LLE Weight Bearing Per Order WBAT   Other Precautions Multiple lines;Fall Risk;Pain;WBS  (L knee hemovac)   Home Living   Type of Home House   Home Layout Two level;Able to live on main level with bedroom/bathroom  (2 thor)   Bathroom Shower/Tub Walk-in shower   Bathroom Toilet Raised   Bathroom Equipment Built-in shower seat   Bathroom Accessibility Accessible   Prior Function   Level of [place] Independent with ADLs;Independent with functional mobility;Independent with IADLS   Lives With Spouse   Receives Help From Family   IADLs Independent with driving;Independent with medication management;Independent with meal prep   Falls in the last 6 months 0   Vocational Retired   Lifestyle   Autonomy Pta, pt was IW ADL/IADL, no AD mobility. +    Reciprocal Relationships supportive spouse and friends who can provide assistance as needed.   Service to Others retired  "[employer]   Intrinsic Gratification [place]   Subjective   Subjective \"My knee hurts but I'll do it\"   ADL   Where Assessed Edge of bed   Eating Assistance 7  Independent   Grooming Assistance 7  Independent   UB Bathing Assistance 6  Modified Independent   LB Bathing Assistance 5  Supervision/Setup   UB Dressing Assistance 6  Modified independent   LB Dressing Assistance 5  Supervision/Setup   Toileting Assistance  5  Supervision/Setup   Functional Assistance 5  Supervision/Setup   Additional Comments provided edu regarding LB dressing technqiues and avoiding twisting at knee.   Bed Mobility   Additional Comments found and left in chair w all needs in reach   Transfers   Sit to Stand 5  Supervision   Additional items Assist x 1;Increased time required;Verbal cues   Stand to Sit 5  Supervision   Additional items Assist x 1;Increased time required;Verbal cues   Toilet transfer 5  Supervision   Additional items Increased time required;Standard toilet   Additional Comments c rw, G safety awareness and insight to condition t/o session.   Functional Mobility   Functional Mobility 5  Supervision   Additional Comments household distance.   Additional items Rolling walker   Balance   Static Sitting Good   Dynamic Sitting Fair +   Static Standing Fair +   Dynamic Standing Fair   Ambulatory Fair   Activity Tolerance   Activity Tolerance Patient tolerated treatment well   Nurse Made Aware ok per RN   RUE Assessment   RUE Assessment WFL   LUE Assessment   LUE Assessment WFL   Hand Function   Gross Motor Coordination Functional   Fine Motor Coordination Functional   Cognition   Overall Cognitive Status WFL   Arousal/Participation Alert;Responsive;Cooperative   Attention Within functional limits   Orientation Level Oriented X4   Memory Within functional limits   Following Commands Follows all commands and directions without difficulty   Comments pt pleasant and cooperative, overall fair safety awareness and " insight to condition t/o session.   Assessment   Prognosis Good   Assessment Pt is a 63 y.o. female admitted 2/28/24 for planned L TKA. Pt underwent said procedure on date of admission, POD #1 w active OT eval and treat orders, WBAT in LLE orders. PMH includes  has a past medical history of Disease of thyroid gland, GERD (gastroesophageal reflux disease), History of hepatitis B, Hyperlipidemia, and Hypertension. Pt lives w spouse in a 2 sh with 2 thor, reports she can stay on the first floor with walk in shower and built in shower chair, raised toilet. Pta, pt was independent w/ ADL/IADL and functional mobility, was driving and was not using any DME at baseline. Currently, pt is mod I for UB ADL,  S for LB ADL and completed transfers/FM w S w RW for support. Toilet transfers completed w same level of assist. From OT standpoint, pt is functioning at baseline level and does not appear to require additional acute OT at this time. Discussed pt's comfort with d/c from OT and any concerns with returning to previous environment; pt does not report any at this time. The patient's raw score on the AM-PAC Daily Activity inpatient short form is 23, standardized score is 51.12, greater than 39.4. Patients at this level are likely to benefit from discharge to home. Please refer to the recommendation of the Occupational Therapist for safe discharge planning. From OT perspective, pt should D/C HOME when medically stable. Acute OT no longer rq at this time, D/C OT.   Goals   [place]   Discharge Recommendation   Rehab Resource Intensity Level, OT No post-acute rehabilitation needs   AM-PAC Daily Activity Inpatient   Lower Body Dressing 3   Bathing 4   Toileting 4   Upper Body Dressing 4   Grooming 4   Eating 4   Daily Activity Raw Score 23   Daily Activity Standardized Score (Calc for Raw Score >=11) 51.12   AM-PAC Applied Cognition Inpatient   Following a Speech/Presentation 4   Understanding Ordinary  Conversation 4   Taking Medications 4   Remembering Where Things Are Placed or Put Away 4   Remembering List of 4-5 Errands 4   Taking Care of Complicated Tasks 4   Applied Cognition Raw Score 24   Applied Cognition Standardized Score 62.21   Modified [place] Scale   Modified [place] Scale 3   End of Consult   Education Provided Yes   Patient Position at End of Consult Bedside chair;All needs within reach   Nurse Communication Nurse aware of consult       XAVIER Mclaughlin, OTR/L

## 2024-02-29 NOTE — PHYSICAL THERAPY NOTE
Physical Therapy Progress Note     02/29/24 0855   PT Last Visit   PT Visit Date 02/29/24   Note Type   Note Type Treatment   Pain Assessment   Pain Assessment Tool FLACC   Pain Rating: FLACC (Rest) - Face 1   Pain Rating: FLACC (Rest) - Legs 1   Pain Rating: FLACC (Rest) - Activity 0   Pain Rating: FLACC (Rest) - Cry 1   Pain Rating: FLACC (Rest) - Consolability 0   Score: FLACC (Rest) 3   Pain Rating: FLACC (Activity) - Face 1   Pain Rating: FLACC (Activity) - Legs 1   Pain Rating: FLACC (Activity) - Activity 1   Pain Rating: FLACC (Activity) - Cry 1   Pain Rating: FLACC (Activity) - Consolability 0   Score: FLACC (Activity) 4   Restrictions/Precautions   LLE Weight Bearing Per Order WBAT   Other Precautions Pain;Fall Risk;WBS;Multiple lines  (hemovac)   Subjective   Subjective Pt encountered seated in recliner, pleasant and agreeable to treatment.  Reports controlled pain, but does have occasional quadriceps muscle spasms since surgery.   Transfers   Sit to Stand 6  Modified independent   Stand to Sit 6  Modified independent   Ambulation/Elevation   Gait pattern Short stride;Inconsistent shahla;Decreased foot clearance;Improper Weight shift   Gait Assistance 5  Supervision   Additional items Assist x 1   Assistive Device Rolling walker   Distance 80' x 2   Stair Management Assistance 5  Supervision   Additional items Assist x 1   Stair Management Technique Two rails;Step to pattern;Foreward   Number of Stairs 5   Balance   Static Sitting Good   Static Standing Fair +   Ambulatory Fair   Activity Tolerance   Activity Tolerance Patient tolerated treatment well;Patient limited by fatigue;Patient limited by pain   Nurse Made Aware yes   Assessment   Prognosis Good   Problem List Decreased strength;Decreased endurance;Decreased mobility;Impaired sensation;Orthopedic restrictions;Pain   Assessment Pt demonstrated improved functional endurance & mobility this date, ambulating repeated household distances & negotiated  steps without need for seated rest or offering complaints of excessive pain.  She does so with reciprocal gait pattern on levels & likely has ability to attempt reciprocal pattern on steps in terms of strength, but this method was deferred in favor of step to pattern due to standard concerns regarding post op knee strength, AROM & pain that could put her at risk for buckling.  Pt given TKR handout post activity with instructions for frequent mobility & stair sequencing to further reinforce the same.  Also reviewed HEP exercises with handout for pt to practice at home in anticipation of follow up OPPT next week.  Pt erports confidence with all mobiitly taks with family's supprot prn & offers no further questions or concerns at this time.  From PT perspective, pt has demonstrated sufficient progress to d/c home with family support & OPPT follow up to progress to PLOF.   Goals   Patient Goals to golf this summer   STG Expiration Date 03/13/24   PT Treatment Day 1   Plan   Treatment/Interventions Functional transfer training;LE strengthening/ROM;Elevations;Therapeutic exercise;Endurance training;Patient/family training;Bed mobility;Equipment eval/education;Gait training   Progress Progressing toward goals   PT Frequency Twice a day   Discharge Recommendation   Rehab Resource Intensity Level, PT III (Minimum Resource Intensity)   Equipment Recommended   (pt owns DME)   AM-PAC Basic Mobility Inpatient   Turning in Flat Bed Without Bedrails 4   Lying on Back to Sitting on Edge of Flat Bed Without Bedrails 4   Moving Bed to Chair 4   Standing Up From Chair Using Arms 4   Walk in Room 4   Climb 3-5 Stairs With Railing 3   Basic Mobility Inpatient Raw Score 23   Basic Mobility Standardized Score 50.88   Highest Level Of Mobility   JH-HLM Goal 7: Walk 25 feet or more   JH-HLM Achieved 7: Walk 25 feet or more       Edson Farah PTA    An [facility] Basic Mobility Raw Score less than 17 suggests pt would benefit from post acute  rehab.  Please also refer to the recommendation of the Physical Therapist for safe discharge planning.

## 2024-02-29 NOTE — PLAN OF CARE
Problem: INFECTION - ADULT  Goal: Absence or prevention of progression during hospitalization  Description: INTERVENTIONS:  - Assess and monitor for signs and symptoms of infection  - Monitor lab/diagnostic results  - Monitor all insertion sites, i.e. indwelling lines, tubes, and drains  - Monitor endotracheal if appropriate and nasal secretions for changes in amount and color  - [place] appropriate cooling/warming therapies per order  - Administer medications as ordered  - Instruct and encourage patient and family to use good hand hygiene technique  - Identify and instruct in appropriate isolation precautions for identified infection/condition  2/29/2024 1054 by Jojo Garcia RN  Outcome: Adequate for Discharge  2/29/2024 1053 by Jojo Garcia RN  Outcome: Adequate for Discharge     Problem: SAFETY ADULT  Goal: Patient will remain free of falls  Description: INTERVENTIONS:  - Educate patient/family on patient safety including physical limitations  - Instruct patient to call for assistance with activity   - Consult OT/PT to assist with strengthening/mobility   - Keep Call bell within reach  - Keep bed low and locked with side rails adjusted as appropriate  - Keep care items and personal belongings within reach  - Initiate and maintain comfort rounds  - Make Fall Risk Sign visible to staff  - Offer Toileting every 2-4 Hours, in advance of need  - Initiate/Maintain bed/chair alarm  - Obtain necessary fall risk management equipment: nonskid footwear   - Apply yellow socks and bracelet for high fall risk patients  - Consider moving patient to room near nurses station  2/29/2024 1054 by Jojo Garcia RN  Outcome: Adequate for Discharge  2/29/2024 1053 by Jojo Garcia RN  Outcome: Adequate for Discharge  Goal: Maintain or return to baseline ADL function  Description: INTERVENTIONS:  -  Assess patient's ability to carry out ADLs; assess patient's baseline for ADL function and identify physical deficits which  impact ability to perform ADLs (bathing, care of mouth/teeth, toileting, grooming, dressing, etc.)  - Assess/evaluate cause of self-care deficits   - Assess range of motion  - Assess patient's mobility; develop plan if impaired  - Assess patient's need for assistive devices and provide as appropriate  - Encourage maximum independence but intervene and supervise when necessary  - Involve family in performance of ADLs  - Assess for home care needs following discharge   - Consider OT consult to assist with ADL evaluation and planning for discharge  - Provide patient education as appropriate  2/29/2024 1054 by Jojo Garcia RN  Outcome: Adequate for Discharge  2/29/2024 1053 by Jojo Garcia RN  Outcome: Adequate for Discharge  Goal: Maintains/Returns to pre admission functional level  Description: INTERVENTIONS:  - Perform AM-PAC 6 Click Basic Mobility/ Daily Activity assessment daily.  - Set and communicate daily mobility goal to care team and patient/family/caregiver.   - Collaborate with rehabilitation services on mobility goals if consulted  - Perform Range of Motion 3 times a day.  - Reposition patient every 2 hours.  - Dangle patient 63 times a day  - Stand patient 3 times a day  - Ambulate patient 3 times a day  - Out of bed to chair 3 times a day   - Out of bed for meals 3 times a day  - Out of bed for toileting  - Record patient progress and toleration of activity level   2/29/2024 1054 by Jojo Garcia RN  Outcome: Adequate for Discharge  2/29/2024 1053 by Jojo Garcia RN  Outcome: Adequate for Discharge     Problem: DISCHARGE PLANNING  Goal: Discharge to home or other facility with appropriate resources  Description: INTERVENTIONS:  - Identify barriers to discharge w/patient and caregiver  - Arrange for needed discharge resources and transportation as appropriate  - Identify discharge learning needs (meds, wound care, etc.)  - Arrange for interpretive services to assist at discharge as needed  - Refer  to Case Management Department for coordinating discharge planning if the patient needs post-hospital services based on physician/advanced practitioner order or complex needs related to functional status, cognitive ability, or social support system  2/29/2024 1054 by Jojo Garcia RN  Outcome: Adequate for Discharge  2/29/2024 1053 by Jojo Garcia RN  Outcome: Adequate for Discharge     Problem: Knowledge Deficit  Goal: Patient/family/caregiver demonstrates understanding of disease process, treatment plan, medications, and discharge instructions  Description: Complete learning assessment and assess knowledge base.  Interventions:  - Provide teaching at level of understanding  - Provide teaching via preferred learning methods  2/29/2024 1054 by Jojo Garcia RN  Outcome: Adequate for Discharge  2/29/2024 1053 by Jojo Garcia RN  Outcome: Adequate for Discharge     Problem: PAIN - ADULT  Goal: Verbalizes/displays adequate comfort level or baseline comfort level  Description: Interventions:  - Encourage patient to monitor pain and request assistance  - Assess pain using appropriate pain scale  - Administer analgesics based on type and severity of pain and evaluate response  - Implement non-pharmacological measures as appropriate and evaluate response  - Consider cultural and social influences on pain and pain management  - Notify physician/advanced practitioner if interventions unsuccessful or patient reports new pain  2/29/2024 1054 by Jojo Garcia RN  Outcome: Adequate for Discharge  2/29/2024 1053 by Jojo Garcia RN  Outcome: Adequate for Discharge

## 2024-02-29 NOTE — PLAN OF CARE
Problem: PHYSICAL THERAPY ADULT  Goal: Performs mobility at highest level of function for planned discharge setting.  See evaluation for individualized goals.  Description: Treatment/Interventions: Functional transfer training, LE strengthening/ROM, Elevations, Therapeutic exercise, Endurance training, Patient/family training, Equipment eval/education, Bed mobility, Gait training, Spoke to nursing  Equipment Recommended: Walker       See flowsheet documentation for full assessment, interventions and recommendations.  Outcome: Progressing  Note: Prognosis: Good  Problem List: Decreased strength, Decreased endurance, Decreased mobility, Impaired sensation, Orthopedic restrictions, Pain  Assessment: Pt demonstrated improved functional endurance & mobility this date, ambulating repeated household distances & negotiated steps without need for seated rest or offering complaints of excessive pain.  She does so with reciprocal gait pattern on levels & likely has ability to attempt reciprocal pattern on steps in terms of strength, but this method was deferred in favor of step to pattern due to standard concerns regarding post op knee strength, AROM & pain that could put her at risk for buckling.  Pt given TKR handout post activity with instructions for frequent mobility & stair sequencing to further reinforce the same.  Also reviewed HEP exercises with handout for pt to practice at home in anticipation of follow up OPPT next week.  Pt erports confidence with all mobiitly taks with family's supprot prn & offers no further questions or concerns at this time.  From PT perspective, pt has demonstrated sufficient progress to d/c home with family support & OPPT follow up to progress to PLOF.  Barriers to Discharge: None     Rehab Resource Intensity Level, PT: III (Minimum Resource Intensity)    See flowsheet documentation for full assessment.

## 2024-03-01 ENCOUNTER — TELEPHONE (OUTPATIENT)
Dept: OBGYN CLINIC | Facility: HOSPITAL | Age: 64
End: 2024-03-01

## 2024-03-01 NOTE — TELEPHONE ENCOUNTER
"Patient contacted for a postoperative follow up assessment. Patient reports doing  \"good, really good.\" She reports she is up and walking every 30 minutes, and inside is cruising furniture, and working on balance in each leg.   She plans to use the RW outside the home.   Patient states \"minimal at best\" pain, and \"more like an ache.\" Patient \"not much swelling\" and more noticeable at the foot. She states  her dressing is clean, dry and intact. Patient is icing the site regularly, and we discussed continuing to ice areas of pain and swelling.     We reviewed patients AVS medication list. Patient is taking Tylenol 1000mg every 8 hours, Gabapentin 100mg TID,  Oxycodone 5mg PRN, lovenox daily (starting this AM). She is using miralax, and had a BM- educated to continue that until going regularly.     Patient denies nausea, vomiting, abdominal pain, chest pain, shortness of breath, fever, dizziness and calf pain. Patient does not have any other questions or concerns at this time. Pt was encouraged to call with any questions, concerns or issues.    "

## 2024-03-04 ENCOUNTER — OFFICE VISIT (OUTPATIENT)
Dept: PHYSICAL THERAPY | Facility: CLINIC | Age: 64
End: 2024-03-04
Payer: MEDICARE

## 2024-03-04 DIAGNOSIS — G89.29 CHRONIC PAIN OF BOTH KNEES: Primary | ICD-10-CM

## 2024-03-04 DIAGNOSIS — M25.561 CHRONIC PAIN OF BOTH KNEES: Primary | ICD-10-CM

## 2024-03-04 DIAGNOSIS — M25.562 CHRONIC PAIN OF BOTH KNEES: Primary | ICD-10-CM

## 2024-03-04 PROCEDURE — 97140 MANUAL THERAPY 1/> REGIONS: CPT | Performed by: PHYSICAL THERAPIST

## 2024-03-04 PROCEDURE — 97161 PT EVAL LOW COMPLEX 20 MIN: CPT | Performed by: PHYSICAL THERAPIST

## 2024-03-04 NOTE — PROGRESS NOTES
PT Evaluation     Today's date: 3/4/2024  Patient name: Delia Morin  : 1960  MRN: [id number]  Referring provider: Jacob Yao PA-C  Dx:   Encounter Diagnosis     ICD-10-CM    1. Chronic pain of both knees  M25.561     M25.562     G89.29                      Assessment  Assessment details: Pt is a 63 y.o. female who presents to outpatient PT s/p TKA with pain, decreased rom, decreased strength and decreased functional mobility. She will benefit from skilled PT to address these deficits in order to achieve her goals and maximize her functional mobility.             Plan  Planned therapy interventions: manual therapy, massage, strengthening, stretching, therapeutic activities, therapeutic exercise, therapeutic training, transfer training, gait training, home exercise program and IADL retraining  Frequency: 2x week  Duration in weeks: 8        Subjective Evaluation    History of Present Illness  Mechanism of injury: 24 underwent uncomplicated TKA. Arrives to the clinic without AD and reports that she is walking at home and occasionally touches on furniture for balance.  Reports that she utilizes Rx medication at night to help with sleep but does not need it t/o the day.             Reports that she would like to return playing golf 3-4x's/week and working out with her .    Patient Goals  Patient goals for therapy: decreased edema, decreased pain, increased motion, increased strength, independence with ADLs/IADLs and return to sport/leisure activities    Pain  Current pain rating: 3  At best pain ratin  At worst pain ratin          Objective  L knee  Post-op    ROM   Flexion: 87   Extension: -23    Strength:   Flexion:  3+/5   Extension:  3+/5    Mod edema  Fair quad set  Neg howman's sign  Ambulates with antalgic gait pattern             Precautions: B/L Knee OA, L TKA 24, HTN      Manuals 3/4            prom kl                                                   Neuro Re-Ed                                                                                                         Ther Ex             Heels slides             saq             Slr flexion             Prone TKE             Step up             Mini squats             Cone taps                          Ther Activity             bike Rom 5'                         Gait Training                                       Modalities             Cp with extension hang 10'

## 2024-03-06 ENCOUNTER — HOSPITAL ENCOUNTER (OUTPATIENT)
Dept: RADIOLOGY | Facility: HOSPITAL | Age: 64
Discharge: HOME | End: 2024-03-06
Attending: ORTHOPAEDIC SURGERY
Payer: MEDICARE

## 2024-03-06 ENCOUNTER — OFFICE VISIT (OUTPATIENT)
Dept: OBGYN CLINIC | Facility: HOSPITAL | Age: 64
End: 2024-03-06

## 2024-03-06 VITALS
SYSTOLIC BLOOD PRESSURE: 125 MMHG | DIASTOLIC BLOOD PRESSURE: 71 MMHG | HEART RATE: 80 BPM | BODY MASS INDEX: 30.67 KG/M2 | HEIGHT: 66 IN

## 2024-03-06 DIAGNOSIS — Z96.652 AFTERCARE FOLLOWING LEFT KNEE JOINT REPLACEMENT SURGERY: ICD-10-CM

## 2024-03-06 DIAGNOSIS — Z47.1 AFTERCARE FOLLOWING LEFT KNEE JOINT REPLACEMENT SURGERY: ICD-10-CM

## 2024-03-06 DIAGNOSIS — Z47.1 AFTERCARE FOLLOWING LEFT KNEE JOINT REPLACEMENT SURGERY: Primary | ICD-10-CM

## 2024-03-06 DIAGNOSIS — Z96.652 AFTERCARE FOLLOWING LEFT KNEE JOINT REPLACEMENT SURGERY: Primary | ICD-10-CM

## 2024-03-06 PROCEDURE — 73560 X-RAY EXAM OF KNEE 1 OR 2: CPT

## 2024-03-06 PROCEDURE — 99024 POSTOP FOLLOW-UP VISIT: CPT | Performed by: PHYSICIAN ASSISTANT

## 2024-03-07 ENCOUNTER — APPOINTMENT (OUTPATIENT)
Dept: PHYSICAL THERAPY | Facility: CLINIC | Age: 64
End: 2024-03-07
Payer: MEDICARE

## 2024-03-07 NOTE — PROGRESS NOTES
63 y.o. female presenting to the office for 1 week follow up status post left total knee arthroplasty (dos: 2/28/24) Patient states that her pain is resolved. She is not taking medications for pain. Patient denies functional limitations from the knee. She is not using an assistive device for ambulation. Patient denies any other radicular symptoms. Patient denies symptoms of an infection. Patient denies any instability. Patient denies any other acute or associated complaints.      Past Medical History:   Diagnosis Date    Disease of thyroid gland     GERD (gastroesophageal reflux disease)     History of hepatitis B     Hyperlipidemia     Hypertension      Past Surgical History:   Procedure Laterality Date    ABCESS DRAINAGE      tonsil at age 3    COLONOSCOPY      [place] ARTHRP KNE CONDYLE&PLATU MEDIAL&LAT COMPARTMENTS Left 2/28/2024    Procedure: ARTHROPLASTY KNEE TOTAL W ROBOT;  Surgeon: Ignacio Leblanc MD;  Location: [facility];  Service: Orthopedics     Results Reviewed       None            Physical Exam  Physical Exam  Constitutional:       Appearance: She is well-developed.   HENT:      Head: Normocephalic and atraumatic.   Eyes:      Pupils: Pupils are equal, round, and reactive to light.   Neck:      Trachea: No tracheal deviation.   Cardiovascular:      Rate and Rhythm: Normal rate and regular rhythm.   Pulmonary:      Effort: Pulmonary effort is normal.      Breath sounds: Normal breath sounds.   Musculoskeletal:      Cervical back: Neck supple.      Left knee: No effusion.   Skin:     General: Skin is warm and dry.   Neurological:      Mental Status: She is alert and oriented to person, place, and time.   Psychiatric:         Behavior: Behavior normal.       Left Knee Exam     Muscle Strength   The patient has normal left knee strength.    Range of Motion   Extension:  0   Flexion:  90     Other   Erythema: absent  Scars: present  Swelling: mild  Effusion: no effusion present            Imaging  I  personally reviewed these images. : joint prosthesis in good position    Assessment/Plan  63 y.o. female 1 week follow up status post left total knee arthroplasty   Continue with activities as tolerated  Continue with physical therapy activities  Complete DVT ppx (4 weeks post-op)  Hold off on dental appointments for 12 weeks post-op  Follow up in 1 week for evaluation without x-ray

## 2024-03-08 ENCOUNTER — OFFICE VISIT (OUTPATIENT)
Dept: PHYSICAL THERAPY | Facility: CLINIC | Age: 64
End: 2024-03-08
Payer: MEDICARE

## 2024-03-08 DIAGNOSIS — M25.561 CHRONIC PAIN OF BOTH KNEES: Primary | ICD-10-CM

## 2024-03-08 DIAGNOSIS — M17.12 ARTHRITIS OF LEFT KNEE: ICD-10-CM

## 2024-03-08 DIAGNOSIS — G89.29 CHRONIC PAIN OF BOTH KNEES: Primary | ICD-10-CM

## 2024-03-08 DIAGNOSIS — M25.562 CHRONIC PAIN OF BOTH KNEES: Primary | ICD-10-CM

## 2024-03-08 PROCEDURE — 97140 MANUAL THERAPY 1/> REGIONS: CPT

## 2024-03-08 PROCEDURE — 97110 THERAPEUTIC EXERCISES: CPT

## 2024-03-08 NOTE — PROGRESS NOTES
"Daily Note     Today's date: 3/8/2024  Patient name: Delia Morin  : 1960  MRN: [id number]  Referring provider: Jacob Yao PA-C  Dx:   Encounter Diagnosis     ICD-10-CM    1. Chronic pain of both knees  M25.561     M25.562     G89.29       2. Arthritis of left knee  M17.12           Start Time: 1430  Stop Time: 1516  Total time in clinic (min): 46 minutes      Subjective: Patient reports her left knee still \"tightens up.\"      Objective: See treatment diary below.      Assessment: Patient's history of right knee pain/osteoarthritis may limit her ability to tolerate TE in standing.  Patient would benefit from continued PT to restore left knee ROM and strength.      Plan: Continue treatment as per PT plan of care.       Precautions: B/L Knee OA, L TKA 24, HTN      Manuals 3/4 3/8           prom kl JLW                                                  Neuro Re-Ed                                                                                                        Ther Ex             Heel slides  5\"x20           saq  5\"x20           Slr flexion  20           Prone TKE  5\"x20           Step ups  6\"  20           Mini squats             hr  30           Cone taps             Sit to stand  2 airex on chair  10           bike  ROM  8'                                     Ther Activity             bike Rom 5'                         Gait Training                                       Modalities             CP with extension hang 10' 7'                               "

## 2024-03-12 ENCOUNTER — OFFICE VISIT (OUTPATIENT)
Dept: PHYSICAL THERAPY | Facility: CLINIC | Age: 64
End: 2024-03-12
Payer: MEDICARE

## 2024-03-12 DIAGNOSIS — M25.561 CHRONIC PAIN OF BOTH KNEES: Primary | ICD-10-CM

## 2024-03-12 DIAGNOSIS — G89.29 CHRONIC PAIN OF BOTH KNEES: Primary | ICD-10-CM

## 2024-03-12 DIAGNOSIS — M25.562 CHRONIC PAIN OF BOTH KNEES: Primary | ICD-10-CM

## 2024-03-12 DIAGNOSIS — M17.12 ARTHRITIS OF LEFT KNEE: ICD-10-CM

## 2024-03-12 PROCEDURE — 97110 THERAPEUTIC EXERCISES: CPT

## 2024-03-12 PROCEDURE — 97140 MANUAL THERAPY 1/> REGIONS: CPT

## 2024-03-12 NOTE — PROGRESS NOTES
"Daily Note     Today's date: 3/12/2024  Patient name: Delia Morin  : 1960  MRN: [id number]  Referring provider: Jacob Yao PA-C  Dx:   Encounter Diagnosis     ICD-10-CM    1. Chronic pain of both knees  M25.561     M25.562     G89.29       2. Arthritis of left knee  M17.12           Start Time: 1227  Stop Time: 1315  Total time in clinic (min): 48 minutes      Subjective: Patient reports she felt okay after the last PT session.  Patient reports she is mostly compliant with the HEP as well as walking.      Objective: See treatment diary below.      Assessment: Patient is recovering well s/p left TKA.  Progression of TE program is tolerated well.  Left knee ROM and strength improving.      Plan: Continue treatment as per PT plan of care.       Precautions: B/L Knee OA, L TKA 24, HTN      Manuals 3/4 3/8 3/12          prom kl JLW JLW                                                 Neuro Re-Ed                                                                                                        Ther Ex             Heel slides  5\"x20 5\"x20          saq  5\"x20 5\"x20          Slr flexion  20 20          Prone TKE  5\"x20 5\"x20          Step ups  6\"  20 6\"  20          Mini squats             hr  30 30          Cone taps   10 on floor  10 on airex          Sit to stand  2 airex on chair  10           leg press single leg squat   20#  20          bike  ROM  8' ROM  8'                                    Ther Activity             bike Rom 5'                         Gait Training                                       Modalities             CP with extension hang 10' 7'                                 "
ambulatory

## 2024-03-13 ENCOUNTER — OFFICE VISIT (OUTPATIENT)
Dept: OBGYN CLINIC | Facility: HOSPITAL | Age: 64
End: 2024-03-13

## 2024-03-13 VITALS
BODY MASS INDEX: 30.67 KG/M2 | HEIGHT: 66 IN | DIASTOLIC BLOOD PRESSURE: 84 MMHG | SYSTOLIC BLOOD PRESSURE: 129 MMHG | HEART RATE: 83 BPM

## 2024-03-13 DIAGNOSIS — Z47.1 AFTERCARE FOLLOWING LEFT KNEE JOINT REPLACEMENT SURGERY: Primary | ICD-10-CM

## 2024-03-13 DIAGNOSIS — Z96.652 AFTERCARE FOLLOWING LEFT KNEE JOINT REPLACEMENT SURGERY: Primary | ICD-10-CM

## 2024-03-13 PROCEDURE — 99024 POSTOP FOLLOW-UP VISIT: CPT | Performed by: PHYSICIAN ASSISTANT

## 2024-03-15 ENCOUNTER — OFFICE VISIT (OUTPATIENT)
Dept: PHYSICAL THERAPY | Facility: CLINIC | Age: 64
End: 2024-03-15
Payer: MEDICARE

## 2024-03-15 DIAGNOSIS — M17.12 ARTHRITIS OF LEFT KNEE: ICD-10-CM

## 2024-03-15 DIAGNOSIS — M25.561 CHRONIC PAIN OF BOTH KNEES: Primary | ICD-10-CM

## 2024-03-15 DIAGNOSIS — G89.29 CHRONIC PAIN OF BOTH KNEES: Primary | ICD-10-CM

## 2024-03-15 DIAGNOSIS — M25.562 CHRONIC PAIN OF BOTH KNEES: Primary | ICD-10-CM

## 2024-03-15 PROCEDURE — 97140 MANUAL THERAPY 1/> REGIONS: CPT

## 2024-03-15 PROCEDURE — 97110 THERAPEUTIC EXERCISES: CPT

## 2024-03-15 NOTE — PROGRESS NOTES
"Daily Note     Today's date: 3/15/2024  Patient name: Delia Morin  : 1960  MRN: [id number]  Referring provider: Jacob Yao PA-C  Dx:   Encounter Diagnosis     ICD-10-CM    1. Chronic pain of both knees  M25.561     M25.562     G89.29       2. Arthritis of left knee  M17.12           Start Time: 08  Stop Time: 935  Total time in clinic (min): 40 minutes    Subjective: Patient reports that left knee is more stiff today. No adverse reactions to previous session reported.       Objective: See treatment diary below      Assessment: Patient was able to achieve full revolution on bike today. Good flexion ROM noted actively and passively. However, patient continues to lack TKE. Edema geeta-patellar noted with good reduction after manuals. Tolerated treatment well. Patient demonstrated fatigue post treatment, exhibited good technique with therapeutic exercises, and would benefit from continued PT      Plan: Continue per plan of care.      Precautions: B/L Knee OA, L TKA 24, HTN      Manuals 3/4 3/8 3/12 3/15         prom kl JLW JLW JL (edema massage posterior knee, HS stretch)                                                Neuro Re-Ed                                                                                                        Ther Ex             Heel slides  5\"x20 5\"x20 5\"x20         saq  5\"x20 5\"x20 5\" x20         Slr flexion  20 20 x20         Prone TKE  5\"x20 5\"x20          Step ups  6\"  20 6\"  20 6\" 20x         Mini squats             hr  30 30 30         Cone taps   10 on floor  10 on airex X15 on airex (2 cones)         Sit to stand  2 airex on chair  10  2 airex x5         leg press single leg squat   20#  20 NV         bike  ROM  8' ROM  8' ROM/Full Rev 8'                                   Ther Activity             bike Rom 5'                         Gait Training                                       Modalities             CP with extension hang 10' 7'                                 "

## 2024-03-19 ENCOUNTER — OFFICE VISIT (OUTPATIENT)
Dept: PHYSICAL THERAPY | Facility: CLINIC | Age: 64
End: 2024-03-19
Payer: MEDICARE

## 2024-03-19 DIAGNOSIS — G89.29 CHRONIC PAIN OF BOTH KNEES: Primary | ICD-10-CM

## 2024-03-19 DIAGNOSIS — M25.562 CHRONIC PAIN OF BOTH KNEES: Primary | ICD-10-CM

## 2024-03-19 DIAGNOSIS — M17.12 ARTHRITIS OF LEFT KNEE: ICD-10-CM

## 2024-03-19 DIAGNOSIS — M25.561 CHRONIC PAIN OF BOTH KNEES: Primary | ICD-10-CM

## 2024-03-19 PROCEDURE — 97110 THERAPEUTIC EXERCISES: CPT

## 2024-03-19 PROCEDURE — 97140 MANUAL THERAPY 1/> REGIONS: CPT

## 2024-03-19 NOTE — PROGRESS NOTES
"Daily Note     Today's date: 3/19/2024  Patient name: Delia Morin  : 1960  MRN: [id number]  Referring provider: Jacob Yao PA-C  Dx:   Encounter Diagnosis     ICD-10-CM    1. Chronic pain of both knees  M25.561     M25.562     G89.29       2. Arthritis of left knee  M17.12           Start Time: 1305  Stop Time: 1358  Total time in clinic (min): 53 minutes      Subjective: Patient reports she has difficulty getting into a comfortable position both sitting and in bed.  Patient reports left knee pain upon standing.      Objective: See treatment diary below.      Assessment: Treatment is tolerated well.  Left knee ROM and strength improving.      Plan: Continue treatment as per PT plan of care.       Precautions: B/L Knee OA, L TKA 24, HTN      Manuals 3/4 3/8 3/12 3/15 3/19        prom kl JLW JLW JL (edema massage posterior knee, HS stretch) PROM only  JLW                                               Neuro Re-Ed                                                                                                        Ther Ex             Heel slides  5\"x20 5\"x20 5\"x20 5\"x20        saq  5\"x20 5\"x20 5\"x20 2#  5\"x20        Slr flexion  20 20 x20 20        Prone quad stretch     30\"x3        Prone TKE  5\"x20 5\"x20  5\"x20        Step ups  6\"  20 6\"  20 6\" 20x 8\"  20        Mini squats             hr  30 30 30 30        Cone taps   10 on floor  10 on airex X15 on airex (2 cones) 15 ea on airex        Sit to stand  2 airex on chair  10  2 airex x5         leg press single leg squat   20#  20 NV 30#  20        bike  ROM  8' ROM  8' ROM/Full Rev 8' 8'                                  Ther Activity             bike Rom 5'                         Gait Training                                       Modalities             CP with extension hang 10' 7'   10'                            "

## 2024-03-22 ENCOUNTER — OFFICE VISIT (OUTPATIENT)
Dept: PHYSICAL THERAPY | Facility: CLINIC | Age: 64
End: 2024-03-22
Payer: MEDICARE

## 2024-03-22 DIAGNOSIS — M17.12 ARTHRITIS OF LEFT KNEE: ICD-10-CM

## 2024-03-22 DIAGNOSIS — M25.561 CHRONIC PAIN OF BOTH KNEES: Primary | ICD-10-CM

## 2024-03-22 DIAGNOSIS — M25.562 CHRONIC PAIN OF BOTH KNEES: Primary | ICD-10-CM

## 2024-03-22 DIAGNOSIS — G89.29 CHRONIC PAIN OF BOTH KNEES: Primary | ICD-10-CM

## 2024-03-22 PROCEDURE — 97140 MANUAL THERAPY 1/> REGIONS: CPT | Performed by: PHYSICAL THERAPIST

## 2024-03-22 PROCEDURE — 97110 THERAPEUTIC EXERCISES: CPT | Performed by: PHYSICAL THERAPIST

## 2024-03-22 NOTE — PROGRESS NOTES
"Daily Note     Today's date: 3/22/2024  Patient name: Delia Morin  : 1960  MRN: [id number]  Referring provider: Jacob Yao PA-C  Dx:   Encounter Diagnosis     ICD-10-CM    1. Chronic pain of both knees  M25.561     M25.562     G89.29       2. Arthritis of left knee  M17.12                        Subjective: Patient reports she has difficulty getting into a comfortable position both sitting and in bed.  Patient reports left knee pain upon standing.      Objective: See treatment diary below.      Assessment: rom is improving nicely but continues to be limited in flexion and extension.    Flexion: 110 prior to manual stretching      Plan: Continue treatment as per PT plan of care.       Precautions: B/L Knee OA, L TKA 24, HTN      Manuals 3/4 3/8 3/12 3/15 3/19 3/22       prom kl JLW JLW JL (edema massage posterior knee, HS stretch) PROM only  JLW kl                                              Neuro Re-Ed                                                                                                        Ther Ex             Heel slides  5\"x20 5\"x20 5\"x20 5\"x20        saq  5\"x20 5\"x20 5\"x20 2#  5\"x20        Slr flexion  20 20 x20 20 x30       Prone quad stretch     30\"x3 30\"x3       Prone TKE  5\"x20 5\"x20  5\"x20 [place] 14.0 5\"x20       Step ups  6\"  20 6\"  20 6\" 20x 8\"  20        Mini squats             hr  30 30 30 30        Cone taps   10 on floor  10 on airex X15 on airex (2 cones) 15 ea on airex        Sit to stand  2 airex on chair  10  2 airex x5         leg press single leg squat   20#  20 NV 30#  20 30#x20       bike  ROM  8' ROM  8' ROM/Full Rev 8' 8' 8'       sls      Airex 30\"x4       Lateral step downs      6\"x10       Ther Activity             bike Rom 5'                         Gait Training                                       Modalities             CP with extension hang 10' 7'   10' 10'                           "

## 2024-04-01 NOTE — PROGRESS NOTES
63 y.o. female presenting to the office for 2 week follow up status post left total knee arthroplasty (dos: 2/28/24) Patient states that her pain is resolved. She is not taking medications for pain. Patient denies functional limitations from the knee. Patient continues to have numbness surrounding the surgical site. Patient denies any other radicular symptoms. Patient denies symptoms of an infection. Patient denies any instability. Patient denies any other acute or associated complaints.      Past Medical History:   Diagnosis Date    Disease of thyroid gland     GERD (gastroesophageal reflux disease)     History of hepatitis B     Hyperlipidemia     Hypertension      Past Surgical History:   Procedure Laterality Date    ABCESS DRAINAGE      tonsil at age 3    COLONOSCOPY      [facility] ARTHRP KNE CONDYLE&PLATU MEDIAL&LAT COMPARTMENTS Left 2/28/2024    Procedure: ARTHROPLASTY KNEE TOTAL W ROBOT;  Surgeon: Ignacio Leblanc MD;  Location: [facility];  Service: Orthopedics     Results Reviewed       None            Physical Exam  Physical Exam  Constitutional:       Appearance: She is well-developed.   HENT:      Head: Normocephalic and atraumatic.   Eyes:      Pupils: Pupils are equal, round, and reactive to light.   Neck:      Trachea: No tracheal deviation.   Cardiovascular:      Rate and Rhythm: Normal rate and regular rhythm.   Pulmonary:      Effort: Pulmonary effort is normal.      Breath sounds: Normal breath sounds.   Musculoskeletal:      Cervical back: Neck supple.      Left knee: No effusion.   Skin:     General: Skin is warm and dry.   Neurological:      Mental Status: She is alert and oriented to person, place, and time.   Psychiatric:         Behavior: Behavior normal.       Left Knee Exam     Range of Motion   Extension:  0   Flexion:  100     Other   Erythema: absent  Scars: present  Swelling: none  Effusion: no effusion present    Comments:  Staples removed today        Assessment/Plan  63 y.o. female 2  week follow up status post left total knee arthroplasty   Continue with activities as tolerated  Continue with physical therapy activities  Complete DVT ppx (4 weeks post-op)  Hold off on dental appointments for 12 weeks post-op  Follow up in 4 weeks for evaluation without x-ray

## 2024-04-02 ENCOUNTER — OFFICE VISIT (OUTPATIENT)
Dept: PHYSICAL THERAPY | Facility: CLINIC | Age: 64
End: 2024-04-02
Payer: MEDICARE

## 2024-04-02 DIAGNOSIS — G89.29 CHRONIC PAIN OF BOTH KNEES: Primary | ICD-10-CM

## 2024-04-02 DIAGNOSIS — M25.562 CHRONIC PAIN OF BOTH KNEES: Primary | ICD-10-CM

## 2024-04-02 DIAGNOSIS — M17.12 ARTHRITIS OF LEFT KNEE: ICD-10-CM

## 2024-04-02 DIAGNOSIS — M25.561 CHRONIC PAIN OF BOTH KNEES: Primary | ICD-10-CM

## 2024-04-02 PROCEDURE — 97140 MANUAL THERAPY 1/> REGIONS: CPT

## 2024-04-02 PROCEDURE — 97110 THERAPEUTIC EXERCISES: CPT

## 2024-04-02 NOTE — PROGRESS NOTES
"Daily Note     Today's date: 2024  Patient name: Delia Morin  : 1960  MRN: [id number]  Referring provider: Jacob Yao PA-C  Dx:   Encounter Diagnosis     ICD-10-CM    1. Chronic pain of both knees  M25.561     M25.562     G89.29       2. Arthritis of left knee  M17.12           Start Time: 1309  Stop Time: 1354  Total time in clinic (min): 45 minutes      Subjective: Patient reports she strained her left quadriceps muscle last Wednesday when stepping up onto a step stool.  Patient reports her left knee seems to be getting straighter.      Objective: See treatment diary below.      Assessment: Fatigue noted when performing lateral step downs.  Left knee ROM and strength improving.      Plan: Continue treatment as per PT plan of care.       Precautions: B/L Knee OA, L TKA 24, HTN      Manuals 3/4 3/8 3/12 3/15 3/19 3/22 4/2      prom kl JLW JLW JL (edema massage posterior knee, HS stretch) PROM only  JLW kl JLW                                             Neuro Re-Ed                                                                                                        Ther Ex             Heel slides  5\"x20 5\"x20 5\"x20 5\"x20        saq  5\"x20 5\"x20 5\"x20 2#  5\"x20  5\"x20      Slr flexion  20 20 x20 20 x30 30      Prone quad stretch     30\"x3 30\"x3 30\"x4      Prone TKE  5\"x20 5\"x20  5\"x20 [place] 14.0 5\"x20 [place]  14.6  5\"x20      Step ups  6\"  20 6\"  20 6\" 20x 8\"  20  8\"  20      Mini squats             hr  30 30 30 30        Cone taps   10 on floor  10 on airex X15 on airex (2 cones) 15 ea on airex        Sit to stand  2 airex on chair  10  2 airex x5         leg press single leg squat   20#  20 NV 30#  20 30#x20 30#  20      bike  ROM  8' ROM  8' ROM/Full Rev 8' 8' 8' 8'      sls      Airex 30\"x4 airex 30\"x3      lateral step downs      6\"x10 6\"  20                                Ther Activity             bike Rom 5'                         Gait Training                                     "   Modalities             CP with extension hang 10' 7'   10' 10' 5'

## 2024-04-04 DIAGNOSIS — E78.2 MIXED HYPERLIPIDEMIA: Primary | ICD-10-CM

## 2024-04-04 DIAGNOSIS — F41.9 ANXIETY: ICD-10-CM

## 2024-04-04 RX ORDER — EZETIMIBE 10 MG/1
10 TABLET ORAL DAILY
Qty: 90 TABLET | Refills: 3 | Status: SHIPPED | OUTPATIENT
Start: 2024-04-04

## 2024-04-04 RX ORDER — PAROXETINE 10 MG/1
10 TABLET, FILM COATED ORAL DAILY
Qty: 90 TABLET | Refills: 0 | Status: SHIPPED | OUTPATIENT
Start: 2024-04-04

## 2024-04-05 ENCOUNTER — OFFICE VISIT (OUTPATIENT)
Dept: PHYSICAL THERAPY | Facility: CLINIC | Age: 64
End: 2024-04-05
Payer: MEDICARE

## 2024-04-05 DIAGNOSIS — M25.561 CHRONIC PAIN OF BOTH KNEES: ICD-10-CM

## 2024-04-05 DIAGNOSIS — G89.29 CHRONIC PAIN OF LEFT KNEE: Primary | ICD-10-CM

## 2024-04-05 DIAGNOSIS — M25.562 CHRONIC PAIN OF LEFT KNEE: Primary | ICD-10-CM

## 2024-04-05 DIAGNOSIS — G89.29 CHRONIC PAIN OF BOTH KNEES: ICD-10-CM

## 2024-04-05 DIAGNOSIS — M25.562 CHRONIC PAIN OF BOTH KNEES: ICD-10-CM

## 2024-04-05 PROCEDURE — 97140 MANUAL THERAPY 1/> REGIONS: CPT | Performed by: PHYSICAL THERAPIST

## 2024-04-05 PROCEDURE — 97110 THERAPEUTIC EXERCISES: CPT | Performed by: PHYSICAL THERAPIST

## 2024-04-05 NOTE — PROGRESS NOTES
Daily Note     Today's date: 2024  Patient name: Delia Morin  : 1960  MRN: [id number]  Referring provider: Jacob Yao PA-C  Dx:   Encounter Diagnosis     ICD-10-CM    1. Chronic pain of left knee  M25.562     G89.29       2. Chronic pain of both knees  M25.561     M25.562     G89.29             Start Time: 1300  Stop Time: 1345  Total time in clinic (min): 45 minutes      Subjective: Patient reports she strained her left quadriceps muscle two weeks ago. Patient reports having a sharp pain in the medial side of her knee over the past couple of days. Patient reports that the uninvolved leg has been causing them more trouble than the involved side. Patient reports her left knee seems to be getting straighter and that she is able to flex her knee fully.       Objective: See treatment diary below.      Assessment: Patient tolerated treatment well. Patient has increased her knee flexion ROM to 120 degrees and is even to the uninvolved side. Patient is still lacking knee extension and would benefit from ROM exercises there. Patient did step ups forward and laterally on the bosu. Patient was able to maintain balance on bosu with no pain on the involved side, but some pain in the uninvolved knee. Patient also did slr flexion with 3lb and was able to do with minimal pain. Patient also did sidesteps with a green TB. Patient was minimally fatigued after exercise. Patient still displayed a need to work on strength, ROM, and balance which follows the exercise program below.         Plan: Continue treatment as per PT plan of care.       Treatment performed by  Jyotsna Short SPT, I attest that this treatment was directly supervised by Ced Espinosa DPT.         Precautions: B/L Knee OA, L TKA 24, HTN      Manuals 3/4 3/8 3/12 3/15 3/19 3/22 4/2 2024       prom kl JLW JLW JL (edema massage posterior knee, HS stretch) PROM only  JLW kl JLW MK                                            Neuro Re-Ed         "                                                                                                Ther Ex             Heel slides  5\"x20 5\"x20 5\"x20 5\"x20        saq  5\"x20 5\"x20 5\"x20 2#  5\"x20  5\"x20      Slr flexion  20 20 x20 20 x30 30 3lb x20     Prone quad stretch     30\"x3 30\"x3 30\"x4 30\"x4     Prone TKE  5\"x20 5\"x20  5\"x20 [place] 14.0 5\"x20 [place]  14.6  5\"x20 [place]  14.6   5\"x20     Step ups  6\"  20 6\"  20 6\" 20x 8\"  20  8\"  20 Step ups on bosu x20     Mini squats             hr  30 30 30 30        Cone taps   10 on floor  10 on airex X15 on airex (2 cones) 15 ea on airex        Sit to stand  2 airex on chair  10  2 airex x5         leg press single leg squat   20#  20 NV 30#  20 30#x20 30#  20 30#  20     bike  ROM  8' ROM  8' ROM/Full Rev 8' 8' 8' 8' 8'     sls      Airex 30\"x4 airex 30\"x3 Airex 30\" x3     lateral step downs      6\"x10 6\"  20 On bosu  X20 ea     Sidesteps         8 laps                  Ther Activity             bike Rom 5'                         Gait Training                                       Modalities             CP with extension hang 10' 7'   10' 10' 5'                          "

## 2024-04-09 ENCOUNTER — OFFICE VISIT (OUTPATIENT)
Dept: PHYSICAL THERAPY | Facility: CLINIC | Age: 64
End: 2024-04-09
Payer: MEDICARE

## 2024-04-09 DIAGNOSIS — Z96.652 S/P TOTAL KNEE REPLACEMENT, LEFT: Primary | ICD-10-CM

## 2024-04-09 PROCEDURE — 97110 THERAPEUTIC EXERCISES: CPT | Performed by: PHYSICAL THERAPIST

## 2024-04-09 NOTE — PROGRESS NOTES
Daily Note     Today's date: 2024  Patient name: Delia Morin  : 1960  MRN: [id number]  Referring provider: Jacob Yao PA-C  Dx:   Encounter Diagnosis     ICD-10-CM    1. S/P total knee replacement, left [Z96.652]  Z96.652               Start Time: 1315  Stop Time: 1400  Total time in clinic (min): 45 minutes      Subjective: Patient reports that she hasn't had any pain. Patient reports that the uninvolved leg has been causing them more trouble than the involved side and she is getting injections later this week for the other knee. Patient reports her left knee is now able to be straight and that she is able to flex her knee fully. Patient stated at the end of exercise the top of her knee patella was slightly painful with pressure on it       Objective: See treatment diary below.      Assessment: Patient tolerated treatment well. Patient is able to reach full ROM of knee flexion and extension. Patient did step ups forward and laterally on the bosu well and was progressed to the rebounder in a single leg stance. Originally, the exercise was difficult, but after a few repitions the patient was able to maintain balance with some instability. Patient also picked up cones in a sls. This would be able focus on the patients strength, mobility, and functionality of replicated ADL. Patient tolerated exercise well with minimal fatigue. Patient demonstrated good functionality and should be RA next appointment for DC.       Plan: Continue treatment as per PT plan of care.       Treatment performed by  Jyotsna Short SPT, I attest that this treatment was directly supervised by Ced Espinosa DPT.         Precautions: B/L Knee OA, L TKA 24, HTN      Manuals 3/4 3/8 3/12 3/15 3/19 3/22 4/2 2024       prom kl JLW JLW JL (edema massage posterior knee, HS stretch) PROM only  JLW kl JLW MK                                            Neuro Re-Ed                                                                   "                                      Ther Ex             Heel slides  5\"x20 5\"x20 5\"x20 5\"x20   5''x20      saq  5\"x20 5\"x20 5\"x20 2#  5\"x20  5\"x20      Slr flexion  20 20 x20 20 x30 30 X20      Prone quad stretch     30\"x3 30\"x3 30\"x4 30\"x4     Prone TKE  5\"x20 5\"x20  5\"x20 [place] 14.0 5\"x20 [place]  14.6  5\"x20      Step ups  6\"  20 6\"  20 6\" 20x 8\"  20  8\"  20 Step ups on bosu x20     Mini squats             hr  30 30 30 30        Cone taps   10 on floor  10 on airex X15 on airex (2 cones) 15 ea on airex        Sit to stand  2 airex on chair  10  2 airex x5         leg press single leg squat   20#  20 NV 30#  20 30#x20 30#  20 30#  20     bike  ROM  8' ROM  8' ROM/Full Rev 8' 8' 8' 8' 8'     sls      Airex 30\"x4 airex 30\"x3 Airex 30\" x3     lateral step downs      6\"x10 6\"  20 On bosu  X20 ea     Sidesteps         GTB 8 laps     Rebounder ball toss Red Ball        X20      Cone pick ups sls        2 racks      Balance board         30'x3     Ther Activity             bike Rom 5'                         Gait Training                                       Modalities             CP with extension hang 10' 7'   10' 10' 5'                          "

## 2024-04-10 ENCOUNTER — OFFICE VISIT (OUTPATIENT)
Dept: OBGYN CLINIC | Facility: HOSPITAL | Age: 64
End: 2024-04-10
Payer: MEDICARE

## 2024-04-10 VITALS
HEIGHT: 66 IN | DIASTOLIC BLOOD PRESSURE: 77 MMHG | HEART RATE: 87 BPM | SYSTOLIC BLOOD PRESSURE: 121 MMHG | BODY MASS INDEX: 30.67 KG/M2

## 2024-04-10 DIAGNOSIS — M19.09 PRIMARY OSTEOARTHRITIS, OTHER SPECIFIED SITE: ICD-10-CM

## 2024-04-10 DIAGNOSIS — M17.11 PRIMARY OSTEOARTHRITIS OF RIGHT KNEE: Primary | ICD-10-CM

## 2024-04-10 DIAGNOSIS — M24.19 OTHER ARTICULAR CARTILAGE DISORDERS, OTHER SPECIFIED SITE: ICD-10-CM

## 2024-04-10 DIAGNOSIS — Z01.812 PRE-OPERATIVE LABORATORY EXAMINATION: ICD-10-CM

## 2024-04-10 PROCEDURE — 20610 DRAIN/INJ JOINT/BURSA W/O US: CPT | Performed by: PHYSICIAN ASSISTANT

## 2024-04-10 PROCEDURE — 99024 POSTOP FOLLOW-UP VISIT: CPT | Performed by: PHYSICIAN ASSISTANT

## 2024-04-10 RX ORDER — SODIUM CHLORIDE, SODIUM LACTATE, POTASSIUM CHLORIDE, CALCIUM CHLORIDE 600; 310; 30; 20 MG/100ML; MG/100ML; MG/100ML; MG/100ML
125 INJECTION, SOLUTION INTRAVENOUS CONTINUOUS
OUTPATIENT
Start: 2024-04-10

## 2024-04-10 RX ORDER — GABAPENTIN 300 MG/1
300 CAPSULE ORAL ONCE
OUTPATIENT
Start: 2024-04-10 | End: 2024-04-10

## 2024-04-10 RX ORDER — CEFAZOLIN SODIUM 2 G/50ML
2000 SOLUTION INTRAVENOUS ONCE
OUTPATIENT
Start: 2024-04-10 | End: 2024-04-10

## 2024-04-10 RX ORDER — TRANEXAMIC ACID 10 MG/ML
1000 INJECTION, SOLUTION INTRAVENOUS ONCE
OUTPATIENT
Start: 2024-04-10 | End: 2024-04-10

## 2024-04-10 RX ORDER — ASCORBIC ACID 500 MG
500 TABLET ORAL 2 TIMES DAILY
Qty: 60 TABLET | Refills: 2 | Status: SHIPPED | OUTPATIENT
Start: 2024-04-10

## 2024-04-10 RX ORDER — ACETAMINOPHEN 325 MG/1
975 TABLET ORAL ONCE
OUTPATIENT
Start: 2024-04-10 | End: 2024-04-10

## 2024-04-10 RX ORDER — FOLIC ACID 1 MG/1
1 TABLET ORAL DAILY
Qty: 30 TABLET | Refills: 2 | Status: SHIPPED | OUTPATIENT
Start: 2024-04-10

## 2024-04-10 RX ORDER — CHLORHEXIDINE GLUCONATE ORAL RINSE 1.2 MG/ML
15 SOLUTION DENTAL ONCE
OUTPATIENT
Start: 2024-04-10 | End: 2024-04-10

## 2024-04-10 RX ORDER — MULTIVIT-MIN/IRON FUM/FOLIC AC 7.5 MG-4
1 TABLET ORAL DAILY
Qty: 30 TABLET | Refills: 2 | Status: SHIPPED | OUTPATIENT
Start: 2024-04-10

## 2024-04-10 RX ORDER — FERROUS SULFATE 324(65)MG
324 TABLET, DELAYED RELEASE (ENTERIC COATED) ORAL
Qty: 30 TABLET | Refills: 2 | Status: SHIPPED | OUTPATIENT
Start: 2024-04-10

## 2024-04-10 RX ADMIN — LIDOCAINE HYDROCHLORIDE 2 ML: 10 INJECTION, SOLUTION INFILTRATION; PERINEURAL at 13:45

## 2024-04-10 RX ADMIN — BUPIVACAINE HYDROCHLORIDE 2 ML: 2.5 INJECTION, SOLUTION INFILTRATION; PERINEURAL at 13:45

## 2024-04-10 RX ADMIN — BETAMETHASONE SODIUM PHOSPHATE AND BETAMETHASONE ACETATE 12 MG: 3; 3 INJECTION, SUSPENSION INTRA-ARTICULAR; INTRALESIONAL; INTRAMUSCULAR; SOFT TISSUE at 13:45

## 2024-04-12 ENCOUNTER — OFFICE VISIT (OUTPATIENT)
Dept: PHYSICAL THERAPY | Facility: CLINIC | Age: 64
End: 2024-04-12
Payer: MEDICARE

## 2024-04-12 DIAGNOSIS — Z96.652 S/P TOTAL KNEE REPLACEMENT, LEFT: Primary | ICD-10-CM

## 2024-04-12 PROCEDURE — 97110 THERAPEUTIC EXERCISES: CPT | Performed by: PHYSICAL THERAPIST

## 2024-04-12 NOTE — PROGRESS NOTES
"Daily Note     Today's date: 2024  Patient name: Delia Morin  : 1960  MRN: [id number]  Referring provider: Jacob Yao PA-C  Dx:   Encounter Diagnosis     ICD-10-CM    1. S/P total knee replacement, left [Z96.652]  Z96.652                            Subjective: Patient reports that her surgeon is pleased with her progress, she is compliant with her hep and she has returned to working out with her .        Objective: See treatment diary below.  L knee:   ROM   Flexion: 124   Extension: -5    Strength:   Flexion:  4+/5   Extension:  4+/5      R knee:    ROM   Flexion: 128   Extension: -8    Strength:   Flexion:  4/5   Extension:  4/5    Crepitus noted with arom          Assessment: Pt has achieved a majority of her goals set at the start of therapy, is independent with her hep, and will be Dc'd at this time. She is instructed to call the clinic if she has question with her hep or if symptoms return.  Thank you for this referral.        Plan: DC to HEP               Precautions: B/L Knee OA, L TKA 24, HTN      Manuals 3/4 3/8 3/12 3/15 3/19 3/22 4/2 2024       prom kl JLW JLW JL (edema massage posterior knee, HS stretch) PROM only  JLW kl JLW kl                                            Neuro Re-Ed                                                                                                        Ther Ex             Heel slides  5\"x20 5\"x20 5\"x20 5\"x20   5''x20      saq  5\"x20 5\"x20 5\"x20 2#  5\"x20  5\"x20      Slr flexion  20 20 x20 20 x30 30 X20      Prone quad stretch     30\"x3 30\"x3 30\"x4 30\"x4     Prone TKE  5\"x20 5\"x20  5\"x20 [place] 14.0 5\"x20 julian  14.6  5\"x20      Step ups  6\"  20 6\"  20 6\" 20x 8\"  20  8\"  20 Step ups on bosu x20     Mini squats             hr  30 30 30 30        Cone taps   10 on floor  10 on airex X15 on airex (2 cones) 15 ea on airex        Sit to stand  2 airex on chair  10  2 airex x5         leg press single leg squat   20#  20 NV 30#  20 30#x20 " "30#  20 30#  20     bike  ROM  8' ROM  8' ROM/Full Rev 8' 8' 8' 8' 8'     sls      Airex 30\"x4 airex 30\"x3 Airex 30\" x3     lateral step downs      6\"x10 6\"  20 On bosu  X20 ea     Sidesteps         GTB 8 laps     Rebounder ball toss Red Ball        X20      Cone pick ups sls        2 racks  2 stacks    Balance board         30'x3     Review of hep         kl    Ther Activity             bike Rom 5'        8'                 Gait Training                                       Modalities             CP with extension hang 10' 7'   10' 10' 5'                          "

## 2024-04-15 RX ORDER — BETAMETHASONE SODIUM PHOSPHATE AND BETAMETHASONE ACETATE 3; 3 MG/ML; MG/ML
12 INJECTION, SUSPENSION INTRA-ARTICULAR; INTRALESIONAL; INTRAMUSCULAR; SOFT TISSUE
Status: COMPLETED | OUTPATIENT
Start: 2024-04-10 | End: 2024-04-10

## 2024-04-15 RX ORDER — BUPIVACAINE HYDROCHLORIDE 2.5 MG/ML
2 INJECTION, SOLUTION INFILTRATION; PERINEURAL
Status: COMPLETED | OUTPATIENT
Start: 2024-04-10 | End: 2024-04-10

## 2024-04-15 RX ORDER — LIDOCAINE HYDROCHLORIDE 10 MG/ML
2 INJECTION, SOLUTION INFILTRATION; PERINEURAL
Status: COMPLETED | OUTPATIENT
Start: 2024-04-10 | End: 2024-04-10

## 2024-04-15 NOTE — PROGRESS NOTES
63 y.o. female presenting to the office for 6 week follow up status post left total knee arthroplasty (dos: 2/28/24) Patient states that her pain is minimal. She notes that the right knee is more painful at this time, and would like a right knee corticosteroid injection. She is not taking medications for pain. Patient denies functional limitations from the knee. Patient continues to have numbness surrounding the surgical site. Patient denies any other radicular symptoms. Patient denies symptoms of an infection. Patient denies any instability. Patient denies any other acute or associated complaints.    Past Medical History:   Diagnosis Date    Disease of thyroid gland     GERD (gastroesophageal reflux disease)     History of hepatitis B     Hyperlipidemia     Hypertension      Past Surgical History:   Procedure Laterality Date    ABCESS DRAINAGE      tonsil at age 3    COLONOSCOPY      UT ARTHRP KNE CONDYLE&PLATU MEDIAL&LAT COMPARTMENTS Left 2/28/2024    Procedure: ARTHROPLASTY KNEE TOTAL W ROBOT;  Surgeon: Ignacio Leblanc MD;  Location: [facility];  Service: Orthopedics     Results Reviewed       None            Physical Exam  Physical Exam  Constitutional:       Appearance: She is well-developed.   HENT:      Head: Normocephalic and atraumatic.   Eyes:      Pupils: Pupils are equal, round, and reactive to light.   Neck:      Trachea: No tracheal deviation.   Cardiovascular:      Rate and Rhythm: Normal rate and regular rhythm.   Pulmonary:      Effort: Pulmonary effort is normal.      Breath sounds: Normal breath sounds.   Musculoskeletal:      Cervical back: Neck supple.      Right knee: No effusion.      Left knee: No effusion.   Skin:     General: Skin is warm and dry.   Neurological:      Mental Status: She is alert and oriented to person, place, and time.   Psychiatric:         Behavior: Behavior normal.       Right Knee Exam     Tenderness   The patient is experiencing tenderness in the lateral joint  "line and medial joint line.    Range of Motion   Extension:  0   Flexion:  120     Other   Erythema: absent  Swelling: none  Effusion: no effusion present      Left Knee Exam     Range of Motion   Extension:  0   Flexion:  110     Other   Erythema: absent  Scars: present  Swelling: none  Effusion: no effusion present    Comments:  Well healed surgical incision          Large joint arthrocentesis: R knee  Universal Protocol:  Consent: Verbal consent obtained.  Consent given by: patient  Time out: Immediately prior to procedure a \"time out\" was called to verify the correct patient, procedure, equipment, support staff and site/side marked as required.  Site marked: the operative site was marked  Supporting Documentation  Indications: pain   Procedure Details  Location: knee - R knee  Preparation: Patient was prepped and draped in the usual sterile fashion  Needle size: 22 G  Ultrasound guidance: no  Approach: anterolateral  Medications administered: 2 mL bupivacaine 0.25 %; 2 mL lidocaine 1 %; 12 mg betamethasone acetate-betamethasone sodium phosphate 6 (3-3) mg/mL    Patient tolerance: patient tolerated the procedure well with no immediate complications  Dressing:  Sterile dressing applied        Assessment/Plan  63 y.o. female 6 week follow up status post left total knee arthroplasty   Continue with activities as tolerated  Continue with physical therapy activities  Hold off on dental appointments for 12 weeks post-op  Follow up in 6 weeks for evaluation with x-ray  Right knee corticosteroid injection provided today.  - patient would like to schedule a right knee surgery for TKR in November, will discuss and gain consent at next visit    "

## 2024-05-15 DIAGNOSIS — Z96.652 AFTERCARE FOLLOWING LEFT KNEE JOINT REPLACEMENT SURGERY: Primary | ICD-10-CM

## 2024-05-15 DIAGNOSIS — Z47.1 AFTERCARE FOLLOWING LEFT KNEE JOINT REPLACEMENT SURGERY: Primary | ICD-10-CM

## 2024-06-02 ENCOUNTER — HOSPITAL ENCOUNTER (EMERGENCY)
Facility: HOSPITAL | Age: 64
Discharge: HOME | End: 2024-06-02
Attending: EMERGENCY MEDICINE | Admitting: EMERGENCY MEDICINE
Payer: MEDICARE

## 2024-06-02 VITALS
SYSTOLIC BLOOD PRESSURE: 115 MMHG | DIASTOLIC BLOOD PRESSURE: 66 MMHG | RESPIRATION RATE: 16 BRPM | OXYGEN SATURATION: 97 % | TEMPERATURE: 97.8 F | HEART RATE: 84 BPM

## 2024-06-02 DIAGNOSIS — S81.811A LACERATION OF RIGHT LOWER EXTREMITY, INITIAL ENCOUNTER: Primary | ICD-10-CM

## 2024-06-02 PROCEDURE — 12001 RPR S/N/AX/GEN/TRNK 2.5CM/<: CPT | Performed by: EMERGENCY MEDICINE

## 2024-06-02 PROCEDURE — 99283 EMERGENCY DEPT VISIT LOW MDM: CPT

## 2024-06-02 PROCEDURE — 99284 EMERGENCY DEPT VISIT MOD MDM: CPT | Performed by: EMERGENCY MEDICINE

## 2024-06-02 RX ORDER — LIDOCAINE HYDROCHLORIDE AND EPINEPHRINE 10; 10 MG/ML; UG/ML
10 INJECTION, SOLUTION INFILTRATION; PERINEURAL ONCE
Status: COMPLETED | OUTPATIENT
Start: 2024-06-02 | End: 2024-06-02

## 2024-06-02 RX ADMIN — LIDOCAINE HYDROCHLORIDE,EPINEPHRINE BITARTRATE 10 ML: 10; .01 INJECTION, SOLUTION INFILTRATION; PERINEURAL at 16:09

## 2024-06-02 NOTE — ED ATTENDING ATTESTATION
6/2/2024  IParish DO, saw and evaluated the patient. I have discussed the patient with the resident/non-physician practitioner and agree with the resident's/non-physician practitioner's findings, Plan of Care, and MDM as documented in the resident's/non-physician practitioner's note, except where noted. All available labs and Radiology studies were reviewed.  I was present for key portions of any procedure(s) performed by the resident/non-physician practitioner and I was immediately available to provide assistance.       At this point I agree with the current assessment done in the Emergency Department.  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  She is up-to-date on tetanus, 4 years ago was her last Tdap.    PE:  The patient is well appearing, non-toxic, in NAD. Head: normocephalic, atraumatic. HEENT: mucous membranes moist.  Neuro: CN2-12 intact, GCS 15. Normal strength and sensation, normal speech and gait. Cap refill < 2 sec.  There is an avulsion laceration, V shaped, to the medial distal right lower leg.    Lac repaired by resident with 5 x prolene sutures, simple interrupted. Stable for d/c home.      ED Course         Critical Care Time  Procedures

## 2024-06-02 NOTE — ED PROVIDER NOTES
History  Chief Complaint   Patient presents with    Leg Injury     Pt presents to the ED with leg injury to the RLE. Pt reports was accidentally assaulted with a piece of rebar that was in the ground. Unknown last tetanus     63-year-old female with no significant past medical history presents to the ED with complaints of right leg laceration.  Patient states that she was hit with a metal rebar pole on her right leg when a coworker was taking out a sign.  Bleeding controlled on arrival.  Pain well-controlled, without prior intervention.  Denies numbness, tingling, redness, swelling, purulent drainage, focal neurological deficits.        Prior to Admission Medications   Prescriptions Last Dose Informant Patient Reported? Taking?   Calcium Carb-Cholecalciferol 600-400 MG-UNIT TABS  Self Yes No   Sig: Take by mouth   Multiple Vitamins-Minerals (multivitamin with minerals) tablet   No No   Sig: Take 1 tablet by mouth daily   PARoxetine (PAXIL) 10 mg tablet   No No   Sig: Take 1 tablet (10 mg total) by mouth daily   acetaminophen (TYLENOL) 650 mg CR tablet   No No   Sig: Take 1 tablet (650 mg total) by mouth every 8 (eight) hours as needed for mild pain   ascorbic acid (VITAMIN C) 500 MG tablet   No No   Sig: Take 1 tablet (500 mg total) by mouth 2 (two) times a day   ascorbic acid (VITAMIN C) 500 mg tablet  Self No No   Sig: Take 1 tablet (500 mg total) by mouth 2 (two) times a day for 60 doses   cyanocobalamin (VITAMIN B-12) 1000 MCG tablet  Self Yes No   Sig: Take 1,000 mcg by mouth in the morning   ezetimibe (ZETIA) 10 mg tablet   No No   Sig: Take 1 tablet (10 mg total) by mouth in the morning   ferrous sulfate 324 (65 Fe) mg   No No   Sig: Take 1 tablet (324 mg total) by mouth daily before breakfast   ferrous sulfate 325 (65 Fe) mg tablet  Self Yes No   Sig: Take 325 mg by mouth daily with breakfast   folic acid (FOLVITE) 1 mg tablet  Self Yes No   Sig: Take 1 mg by mouth in the morning   folic acid (FOLVITE) 1 mg  tablet   No No   Sig: Take 1 tablet (1 mg total) by mouth daily   gabapentin (Neurontin) 100 mg capsule   No No   Sig: Take 1 capsule (100 mg total) by mouth 3 (three) times a day   hydrochlorothiazide (HYDRODIURIL) 25 mg tablet  Self No No   Sig: Take 1 tablet (25 mg total) by mouth daily   levothyroxine 75 mcg tablet  Self No No   Sig: Take 1 tablet (75 mcg total) by mouth daily   metoprolol succinate (TOPROL-XL) 50 mg 24 hr tablet  Self No No   Sig: Take 1 tablet (50 mg total) by mouth daily   omeprazole (PriLOSEC) 40 MG capsule  Self No No   Sig: Take 1 capsule (40 mg total) by mouth 2 (two) times a day for 30 days, THEN 1 capsule (40 mg total) daily.   triamcinolone (KENALOG) 0.1 % cream  Self Yes No   valsartan (DIOVAN) 160 mg tablet  Self No No   Sig: Take 1 tablet (160 mg total) by mouth daily      Facility-Administered Medications: None       Past Medical History:   Diagnosis Date    Disease of thyroid gland     GERD (gastroesophageal reflux disease)     History of hepatitis B     Hyperlipidemia     Hypertension        Past Surgical History:   Procedure Laterality Date    ABCESS DRAINAGE      tonsil at age 3    COLONOSCOPY      ID ARTHRP KNE CONDYLE&PLATU MEDIAL&LAT COMPARTMENTS Left 2/28/2024    Procedure: ARTHROPLASTY KNEE TOTAL W ROBOT;  Surgeon: Ignacio Lbelanc MD;  Location: [facility];  Service: Orthopedics       No family history on file.  I have reviewed and agree with the history as documented.    E-Cigarette/Vaping    E-Cigarette Use Never User      E-Cigarette/Vaping Substances     Social History     Tobacco Use    Smoking status: Never    Smokeless tobacco: Never   Vaping Use    Vaping status: Never Used   Substance Use Topics    Alcohol use: Yes     Alcohol/week: 7.0 standard drinks of alcohol     Types: 7 Glasses of wine per week    Drug use: Never        Review of Systems   Constitutional:  Negative for chills and fever.   Respiratory:  Negative for shortness of breath.     Cardiovascular:  Negative for chest pain.   Musculoskeletal:  Negative for myalgias.   Skin:  Positive for wound.   Neurological:  Negative for weakness and numbness.       Physical Exam  ED Triage Vitals [06/02/24 1532]   Temperature Pulse Respirations Blood Pressure SpO2   97.8 °F (36.6 °C) 84 16 115/66 97 %      Temp Source Heart Rate Source Patient Position - Orthostatic VS BP Location FiO2 (%)   Oral Monitor Sitting Right arm --      Pain Score       2             Orthostatic Vital Signs  Vitals:    06/02/24 1532   BP: 115/66   Pulse: 84   Patient Position - Orthostatic VS: Sitting       Physical Exam  Vitals and nursing note reviewed.   Constitutional:       General: She is not in acute distress.     Appearance: Normal appearance. She is not ill-appearing or toxic-appearing.   HENT:      Head: Normocephalic and atraumatic.      Right Ear: External ear normal.      Left Ear: External ear normal.      Nose: Nose normal. No congestion or rhinorrhea.      Mouth/Throat:      Mouth: Mucous membranes are moist.      Pharynx: Oropharynx is clear. No oropharyngeal exudate or posterior oropharyngeal erythema.   Eyes:      General:         Right eye: No discharge.         Left eye: No discharge.      Extraocular Movements: Extraocular movements intact.      Conjunctiva/sclera: Conjunctivae normal.      Pupils: Pupils are equal, round, and reactive to light.   Cardiovascular:      Rate and Rhythm: Normal rate and regular rhythm.      Pulses: Normal pulses.      Heart sounds: Normal heart sounds. No murmur heard.  Pulmonary:      Effort: Pulmonary effort is normal. No respiratory distress.      Breath sounds: Normal breath sounds.   Musculoskeletal:         General: Signs of injury present. No tenderness. Normal range of motion.      Cervical back: Normal range of motion.   Skin:     General: Skin is warm and dry.      Capillary Refill: Capillary refill takes less than 2 seconds.      Findings: Lesion present.       Comments: 2.5cm V shaped laceration that goes into the subcutaneous layer. No active bleeding. Mild surrounding eccymosis. No tendon or significant vascular involvement.    Neurological:      General: No focal deficit present.      Mental Status: She is alert and oriented to person, place, and time. Mental status is at baseline.         ED Medications  Medications   lidocaine-epinephrine (XYLOCAINE/EPINEPHRINE) 1 %-1:100,000 injection 10 mL (10 mL Infiltration Given by Other 6/2/24 1601)       Diagnostic Studies  Results Reviewed       None                   No orders to display         Procedures  Universal Protocol:  Consent: Verbal consent obtained.  Risks and benefits: risks, benefits and alternatives were discussed  Consent given by: patient  Patient understanding: patient states understanding of the procedure being performed  Patient identity confirmed: verbally with patient and arm band  Laceration repair    Date/Time: 6/2/2024 4:16 PM    Performed by: Reese Wen DO  Authorized by: Reese Wen DO  Body area: lower extremity  Location details: right lower leg  Laceration length: 2.5 cm  Foreign bodies: no foreign bodies  Tendon involvement: none  Nerve involvement: none  Vascular damage: no  Anesthesia: local infiltration    Anesthesia:  Local Anesthetic: lidocaine 1% with epinephrine  Anesthetic total: 8 mL    Sedation:  Patient sedated: no      Wound Dehiscence:  Superficial Wound Dehiscence: simple closure      Procedure Details:  Irrigation solution: saline  Irrigation method: syringe  Amount of cleaning: standard  Debridement: none  Degree of undermining: none  Skin closure: 4-0 Prolene  Number of sutures: 5  Technique: simple  Approximation: close  Approximation difficulty: simple  Dressing: gauze roll (non-adherent dressing.)  Patient tolerance: patient tolerated the procedure well with no immediate complications            ED Course                                       Medical Decision  "Making  Patient with history as above presented to triage with CC of \" Patient presents with:  Leg Injury: Pt presents to the ED with leg injury to the RLE. Pt reports was accidentally assaulted with a piece of rebar that was in the ground. Unknown last tetanus   \"    Hx obtained from pt    63-year-old female presenting with laceration to right lower extremity.  Tetanus up-to-date.  Distally neurovascularly intact.  2.5 cm laceration/avulsion involving subcutaneous tissue, but without tendon, nerve or major vascular involvement.  Bleeding controlled on arrival.  No bony tenderness, shared decision making was made to hold off on x-ray.  Plan to do laceration repair and have patient follow-up with outpatient for suture removal and further follow-up.    Laceration repaired as above.  Reviewed care instructions along with strict return precautions.  Patient agreeable discharge plan.    Patient was nontoxic appearing and stable. Exam as above. Ambulatory and Tolerating PO.     I have independently ordered, reviewed and interpreted the following: labs and/or imaging studies listed above.  Reviewed external records including notes, and prior labs/imaging results.    DDx including but not limited to: laceration, deep structure involvement, foreign body, fracture, wound infection. .     Consideration was given for admission, but the patient was stable for outpatient management.    Disposition: Discussed need for follow up with their primary doctor or specialist to review all results, including incidental findings as above. Patient discharged with explanation of ED workup and diagnosis, instructions on how to obtain outpatient follow up, care instructions at home, and strict return precautions if patient develops new or worsening symptoms. Patients questions answered and agreeable with discharge plan.     See ED Course for further MDM.      PLEASE NOTE:  This encounter was completed utilizing the M[employer] Direct " Speech Voice Recognition Software. Grammatical errors, random word insertions, pronoun errors and incomplete sentences are occasional inherent consequences of the system due to software limitations, ambient noise and hardware issues.These may be missed by proof reading prior to affixing electronic signature. Any questions or concerns about the content, text or information contained within the body of this dictation should be directly addressed to the physician for clarification. Please do not hesitate to call me directly if you have any questions or concerns.      Amount and/or Complexity of Data Reviewed  External Data Reviewed: notes.    Risk  Prescription drug management.          Disposition  Final diagnoses:   Laceration of right lower extremity, initial encounter     Time reflects when diagnosis was documented in both MDM as applicable and the Disposition within this note       Time User Action Codes Description Comment    6/2/2024  4:10 PM Reese Wen Add [S81.811A] Laceration of right lower extremity, initial encounter           ED Disposition       ED Disposition   Discharge    Condition   Stable    Date/Time   Sun Jun 2, 2024  4:48 PM    Comment   Delia Morin discharge to home/self care.                   Follow-up Information       Follow up With Specialties Details Why Contact Info    Todd Kendrick MD Internal Medicine  Please call tomorrow to schedule an appointment [address] [facility] [address]  [place] [id number] [phone]              Patient's Medications   Discharge Prescriptions    No medications on file     No discharge procedures on file.    PDMP Review       None             ED Provider  Attending physically available and evaluated Delia Morin. I managed the patient along with the ED Attending.    Electronically Signed by           Reese Wen DO  06/02/24 3489

## 2024-06-02 NOTE — DISCHARGE INSTRUCTIONS
Keep area covered & dry for ~24 hours.  Then cleanse wound 1-2 times daily with mild soap & water.  Pat dry and apply an over-the-counter antibacterial ointment (ie. bacitracin, triple antibiotic or neosporin) to the area to prevent infection.  Cover with a bandage to keep clean.  Sutures will need to be removed in 10-14 days.  This may be performed in your primary care physician's office or the Emergency Department. Have the wound checked sooner for any drainage other than a clear or bloody fluid if you increased pain, warmth or redness around the area.

## 2024-06-04 ENCOUNTER — HOSPITAL ENCOUNTER (OUTPATIENT)
Dept: RADIOLOGY | Facility: HOSPITAL | Age: 64
Discharge: HOME | End: 2024-06-04
Attending: ORTHOPAEDIC SURGERY
Payer: MEDICARE

## 2024-06-04 ENCOUNTER — OFFICE VISIT (OUTPATIENT)
Dept: OBGYN CLINIC | Facility: HOSPITAL | Age: 64
End: 2024-06-04

## 2024-06-04 VITALS
DIASTOLIC BLOOD PRESSURE: 65 MMHG | BODY MASS INDEX: 30.67 KG/M2 | HEART RATE: 82 BPM | SYSTOLIC BLOOD PRESSURE: 100 MMHG | HEIGHT: 66 IN

## 2024-06-04 DIAGNOSIS — Z47.1 AFTERCARE FOLLOWING LEFT KNEE JOINT REPLACEMENT SURGERY: ICD-10-CM

## 2024-06-04 DIAGNOSIS — Z96.652 S/P TOTAL KNEE ARTHROPLASTY, LEFT: Primary | ICD-10-CM

## 2024-06-04 DIAGNOSIS — Z96.652 AFTERCARE FOLLOWING LEFT KNEE JOINT REPLACEMENT SURGERY: ICD-10-CM

## 2024-06-04 PROCEDURE — 73560 X-RAY EXAM OF KNEE 1 OR 2: CPT

## 2024-06-04 PROCEDURE — 99024 POSTOP FOLLOW-UP VISIT: CPT | Performed by: ORTHOPAEDIC SURGERY

## 2024-06-04 NOTE — PROGRESS NOTES
63 y.o.female presents 3 months following left total knee replacement.  She describes ongoing relief of her preprocedural weightbearing pain in the left knee    Review of Systems  Review of systems negative unless otherwise specified in HPI    Past Medical History  Past Medical History:   Diagnosis Date    Disease of thyroid gland     GERD (gastroesophageal reflux disease)     History of hepatitis B     Hyperlipidemia     Hypertension        Past Surgical History  Past Surgical History:   Procedure Laterality Date    ABCESS DRAINAGE      tonsil at age 3    COLONOSCOPY      MA ARTHRP KNE CONDYLE&PLATU MEDIAL&LAT COMPARTMENTS Left 2/28/2024    Procedure: ARTHROPLASTY KNEE TOTAL W ROBOT;  Surgeon: Ignacio Leblanc MD;  [facility];  Service: Orthopedics       Current Medications  Current Outpatient Medications on File Prior to Visit   Medication Sig Dispense Refill    acetaminophen (TYLENOL) 650 mg CR tablet Take 1 tablet (650 mg total) by mouth every 8 (eight) hours as needed for mild pain 30 tablet 0    ascorbic acid (VITAMIN C) 500 mg tablet Take 1 tablet (500 mg total) by mouth 2 (two) times a day for 60 doses 60 tablet 0    ascorbic acid (VITAMIN C) 500 MG tablet Take 1 tablet (500 mg total) by mouth 2 (two) times a day 60 tablet 2    Calcium Carb-Cholecalciferol 600-400 MG-UNIT TABS Take by mouth      cyanocobalamin (VITAMIN B-12) 1000 MCG tablet Take 1,000 mcg by mouth in the morning      ezetimibe (ZETIA) 10 mg tablet Take 1 tablet (10 mg total) by mouth in the morning 90 tablet 3    ferrous sulfate 324 (65 Fe) mg Take 1 tablet (324 mg total) by mouth daily before breakfast 30 tablet 2    ferrous sulfate 325 (65 Fe) mg tablet Take 325 mg by mouth daily with breakfast      folic acid (FOLVITE) 1 mg tablet Take 1 mg by mouth in the morning      folic acid (FOLVITE) 1 mg tablet Take 1 tablet (1 mg total) by mouth daily 30 tablet 2    gabapentin (Neurontin) 100 mg capsule Take 1 capsule (100 mg  total) by mouth 3 (three) times a day 90 capsule 0    hydrochlorothiazide (HYDRODIURIL) 25 mg tablet Take 1 tablet (25 mg total) by mouth daily 90 tablet 3    levothyroxine 75 mcg tablet Take 1 tablet (75 mcg total) by mouth daily 90 tablet 3    metoprolol succinate (TOPROL-XL) 50 mg 24 hr tablet Take 1 tablet (50 mg total) by mouth daily 90 tablet 3    Multiple Vitamins-Minerals (multivitamin with minerals) tablet Take 1 tablet by mouth daily 30 tablet 2    omeprazole (PriLOSEC) 40 MG capsule Take 1 capsule (40 mg total) by mouth 2 (two) times a day for 30 days, THEN 1 capsule (40 mg total) daily. 90 capsule 3    PARoxetine (PAXIL) 10 mg tablet Take 1 tablet (10 mg total) by mouth daily 90 tablet 0    triamcinolone (KENALOG) 0.1 % cream       valsartan (DIOVAN) 160 mg tablet Take 1 tablet (160 mg total) by mouth daily 90 tablet 3     No current facility-administered medications on file prior to visit.       Recent Labs (HCT,HGB,PT,INR,ESR,CRP,GLU,HgA1C)  0   Lab Value Date/Time    HCT 31.6 (L) 02/29/2024 0535    HCT 41.5 10/29/2015 0914    HGB 10.4 (L) 02/29/2024 0535    HGB 14.2 10/29/2015 0914    WBC 12.04 (H) 02/29/2024 0535    WBC 5.65 10/29/2015 0914    INR 0.96 02/07/2024 0816    GLUCOSE 86 10/29/2015 0914    HGBA1C 5.5 02/07/2024 0816         Physical exam  General: Awake, Alert, Oriented  Eyes: Pupils equal, round and reactive to light  Heart: regular rate and rhythm  Lungs: No audible wheezing  Abdomen: soft  Left knee has a healed anterior scar.  The knee is neutral alignment.  Extension is good flexion is excellent.  There is no mid flexion valgus instability.  There is no tenderness left calf    Imaging  I have personally reviewed x-rays of the left knee and my interpretation as follows:  2 views of the left knee show total knee replacement satisfactory position    Procedure  Indicated performed today    Assessment/Plan:   63 y.o.female months following left total knee replacement with well clinical  radiographic appearance.  She will continue ad derek. use left knee.  Office follow-up in 3 months time is my recommendation.  X-rays 2 views left knee should be obtained upon arrival for her 6-month checkup

## 2024-06-05 ENCOUNTER — OFFICE VISIT (OUTPATIENT)
Dept: OBGYN CLINIC | Facility: CLINIC | Age: 64
End: 2024-06-05
Payer: MEDICARE

## 2024-06-05 VITALS
BODY MASS INDEX: 31.98 KG/M2 | HEIGHT: 66 IN | DIASTOLIC BLOOD PRESSURE: 78 MMHG | SYSTOLIC BLOOD PRESSURE: 120 MMHG | WEIGHT: 199 LBS

## 2024-06-05 DIAGNOSIS — Z12.31 ENCOUNTER FOR SCREENING MAMMOGRAM FOR MALIGNANT NEOPLASM OF BREAST: ICD-10-CM

## 2024-06-05 DIAGNOSIS — Z13.820 ENCOUNTER FOR SCREENING FOR OSTEOPOROSIS: ICD-10-CM

## 2024-06-05 DIAGNOSIS — N95.9 UNSPECIFIED MENOPAUSAL AND PERIMENOPAUSAL DISORDER: ICD-10-CM

## 2024-06-05 DIAGNOSIS — Z01.419 WELL WOMAN EXAM WITH ROUTINE GYNECOLOGICAL EXAM: Primary | ICD-10-CM

## 2024-06-05 PROCEDURE — G0101 CA SCREEN;PELVIC/BREAST EXAM: HCPCS

## 2024-06-05 NOTE — PROGRESS NOTES
ASSESSMENT & PLAN:   Diagnoses and all orders for this visit:    Well woman exam with routine gynecological exam    Encounter for screening mammogram for malignant neoplasm of breast  -     Mammo screening bilateral w 3d & cad; Future    Encounter for screening for osteoporosis  -     DXA bone density spine hip and pelvis; Future    Unspecified menopausal and perimenopausal disorder  -     DXA bone density spine hip and pelvis; Future      The following were reviewed in today's visit: ASCCP guidelines (Pap screen not indicated after age 65), STD testing breast self exam, mammography screening ordered, STD testing, exercise, and healthy diet.    Patient to return to office in yearly for annual exam.     All questions have been answered to her satisfaction.    CC:  Annual Gynecologic Examination  Chief Complaint   Patient presents with    Gynecologic Exam     Pt is here for her yearly exam. Pap UTD (last one )/ mammo ordered.  Last pap 2022 neg pap/ neg hpv (in care everywhere)   Last mammo 2023, 2-Benign- Repeat 1 yr   Last colonoscopy 2014          HPI: Delia Morin is a 63 y.o.  who presents for annual gynecologic examination.  She has the following concerns:  none. She is a new patient to our office, [place] here in [place]. She was previously seeing a GYN in [place]. Patient believes she had a colonoscopy ~2 years ago in [place], she will work on obtaining these records.       Health Maintenance:    Exercise: frequently  Breast exams/breast awareness: yes  Last mammogram: 2023, BIRADS2  Colorectal cancer screening: colonoscopy 2014, believes she had a colonoscopy about 2 years  DEXA: ~ 5 years ago, records not currently available. Ordered DEXA now for completion.    Past Medical History:   Diagnosis Date    Disease of thyroid gland     GERD (gastroesophageal reflux disease)     History of hepatitis B     Hyperlipidemia      Hypertension        Past Surgical History:   Procedure Laterality Date    ABCESS DRAINAGE      tonsil at age 3    COLONOSCOPY      NM ARTHRP KNE CONDYLE&PLATU MEDIAL&LAT COMPARTMENTS Left 2/28/2024    Procedure: ARTHROPLASTY KNEE TOTAL W ROBOT;  Surgeon: Ignacio Leblanc MD;  [facility];  Service: Orthopedics       Past OB/Gyn History:   No LMP recorded. Patient is postmenopausal.    Patient is menopausal.   Menopausal symptoms: None  Last Pap: 4/11/2022 : no abnormalities; further pap screening not indicated  History of abnormal Pap smear: no    Patient is currently sexually active.     Family History  History reviewed. No pertinent family history.    Family history of uterine or ovarian cancer: no  Family history of breast cancer: no  Family history of colon cancer: no    Social History:  Social History     Socioeconomic History    Marital status: /Civil Union     Spouse name: Not on file    Number of children: Not on file    Years of education: Not on file    Highest education level: Not on file   Occupational History    Not on file   Tobacco Use    Smoking status: Never    Smokeless tobacco: Never   Vaping Use    Vaping status: Never Used   Substance and Sexual Activity    Alcohol use: Yes     Alcohol/week: 7.0 standard drinks of alcohol     Types: 7 Glasses of wine per week     Comment: social    Drug use: Never    Sexual activity: Yes     Partners: Male   Other Topics Concern    Not on file   Social History Narrative    Not on file     Social Determinants of Health     Financial Resource Strain: Low Risk  (11/30/2023)    Received from [facility], [email or web address]    Overall Financial Resource Strain (CARDIA)     Difficulty of Paying Living Expenses: Not hard at all   Food Insecurity: No Food Insecurity (11/30/2023)    [facility], [facility]    Hunger Vital Sign     Worried About Running Out of Food in the Last Year: Never true     Ran Out of Food in the Last Year: Never true   Transportation Needs: No Transportation Needs (11/30/2023)    [facility], [facility]    PRAPARE - Transportation     Lack of Transportation (Medical): No     Lack of Transportation (Non-Medical): No   Physical Activity: Sufficiently Active (11/30/2023)    Received from [facility], [facility]    Exercise Vital Sign     Days of Exercise per Week: 3 days     Minutes of Exercise per Session: 60 min   Stress: No Stress Concern Present (11/30/2023)    Received from [facility], [facility]    [employer] [place] of Occupational Health - Occupational Stress Questionnaire     Feeling of Stress : Not at all   Social Connections: Socially Integrated (11/30/2023)    [facility], [facility]    Social Connection and Isolation Panel [NHANES]     Frequency of Communication with Friends and Family: More than three times a week     Frequency of Social Gatherings with Friends and Family: More than three times a week     Attends Yarsanism Services: More than 4 times per year     Active Member of Clubs or Organizations: Yes      Attends Club or Organization Meetings: More than 4 times per year     Marital Status:    Intimate Partner Violence: Low Risk  (8/7/2023)    Received from [facility] [place], [facility] [place]    SDOH Do You Feel Safe At Home     Do you feel safe at home?: Yes   Housing Stability: Low Risk  (11/30/2023)    [facility], [facility] Subs[facility] and Affiliates    Housing Stability Vital Sign     Unable to Pay for Housing in the Last Year: No     Number of Places Lived in the Last Year: 2     In the last 12 months, was there a time when you did not have a steady place to sleep or slept in a shelter (including now)?: No     Domestic violence screen: negative    Allergies:  Allergies   Allergen Reactions    Amlodipine Swelling and Other (See Comments)     Ankle swelling      Atorvastatin Cough and Myalgia     Cough    Penicillins Rash    Sulfa Antibiotics Rash       Medications:    Current Outpatient Medications:     acetaminophen (TYLENOL) 650 mg CR tablet, Take 1 tablet (650 mg total) by mouth every 8 (eight) hours as needed for mild pain, Disp: 30 tablet, Rfl: 0    ascorbic acid (VITAMIN C) 500 MG tablet, Take 1 tablet (500 mg total) by mouth 2 (two) times a day, Disp: 60 tablet, Rfl: 2    Calcium Carb-Cholecalciferol 600-400 MG-UNIT TABS, Take by mouth, Disp: , Rfl:     cyanocobalamin (VITAMIN B-12) 1000 MCG tablet, Take 1,000 mcg by mouth in the morning, Disp: , Rfl:     ezetimibe (ZETIA) 10 mg tablet, Take 1 tablet (10 mg total) by mouth in the morning, Disp: 90 tablet, Rfl: 3    gabapentin (Neurontin) 100 mg capsule, Take 1 capsule (100 mg total) by mouth 3 (three) times a day, Disp: 90 capsule, Rfl: 0    hydrochlorothiazide (HYDRODIURIL) 25 mg tablet, Take 1 tablet (25 mg total) by mouth daily, Disp: 90 tablet, Rfl: 3    levothyroxine 75 mcg tablet, Take 1 tablet (75 mcg total) by  mouth daily, Disp: 90 tablet, Rfl: 3    metoprolol succinate (TOPROL-XL) 50 mg 24 hr tablet, Take 1 tablet (50 mg total) by mouth daily, Disp: 90 tablet, Rfl: 3    Multiple Vitamins-Minerals (multivitamin with minerals) tablet, Take 1 tablet by mouth daily, Disp: 30 tablet, Rfl: 2    PARoxetine (PAXIL) 10 mg tablet, Take 1 tablet (10 mg total) by mouth daily, Disp: 90 tablet, Rfl: 0    triamcinolone (KENALOG) 0.1 % cream, , Disp: , Rfl:     valsartan (DIOVAN) 160 mg tablet, Take 1 tablet (160 mg total) by mouth daily, Disp: 90 tablet, Rfl: 3    ascorbic acid (VITAMIN C) 500 mg tablet, Take 1 tablet (500 mg total) by mouth 2 (two) times a day for 60 doses, Disp: 60 tablet, Rfl: 0    ferrous sulfate 324 (65 Fe) mg, Take 1 tablet (324 mg total) by mouth daily before breakfast (Patient not taking: Reported on 6/5/2024), Disp: 30 tablet, Rfl: 2    ferrous sulfate 325 (65 Fe) mg tablet, Take 325 mg by mouth daily with breakfast (Patient not taking: Reported on 6/5/2024), Disp: , Rfl:     folic acid (FOLVITE) 1 mg tablet, Take 1 mg by mouth in the morning (Patient not taking: Reported on 6/5/2024), Disp: , Rfl:     folic acid (FOLVITE) 1 mg tablet, Take 1 tablet (1 mg total) by mouth daily (Patient not taking: Reported on 6/5/2024), Disp: 30 tablet, Rfl: 2    omeprazole (PriLOSEC) 40 MG capsule, Take 1 capsule (40 mg total) by mouth 2 (two) times a day for 30 days, THEN 1 capsule (40 mg total) daily., Disp: 90 capsule, Rfl: 3    Review of Systems:  Review of Systems   Constitutional:  Negative for chills and fever.   Respiratory:  Negative for shortness of breath.    Cardiovascular:  Negative for chest pain.   Gastrointestinal:  Negative for abdominal pain, constipation and diarrhea.   Genitourinary:  Negative for dysuria, frequency, pelvic pain, vaginal discharge and vaginal pain.   Psychiatric/Behavioral:  Negative for self-injury and suicidal ideas.          Physical Exam:  /78 (BP Location: Right arm, Patient  "Position: Sitting, Cuff Size: Standard)   Ht 5' 6\" (1.676 m)   Wt 90.3 kg (199 lb)   BMI 32.12 kg/m²    Physical Exam  Constitutional:       Appearance: Normal appearance.   Genitourinary:      Vulva and urethral meatus normal.      No labial fusion noted.      No vaginal erythema or tenderness.        Right Adnexa: not tender.     Left Adnexa: not tender.     No cervical discharge or friability.      Uterus is not tender.   Breasts:     Breasts are symmetrical.      Right: Normal.      Left: Normal.   HENT:      Head: Normocephalic and atraumatic.   Neck:      Thyroid: No thyroid mass or thyroid tenderness.   Cardiovascular:      Rate and Rhythm: Normal rate and regular rhythm.      Heart sounds: Normal heart sounds. No murmur heard.  Pulmonary:      Effort: Pulmonary effort is normal.      Breath sounds: Normal breath sounds. No wheezing.   Abdominal:      Palpations: Abdomen is soft. There is no mass.      Tenderness: There is no abdominal tenderness.   Lymphadenopathy:      Cervical: No cervical adenopathy.   Neurological:      General: No focal deficit present.      Mental Status: She is alert and oriented to person, place, and time.   Skin:     General: Skin is warm and dry.   Psychiatric:         Mood and Affect: Mood normal.         Behavior: Behavior normal.   Vitals and nursing note reviewed.                              "

## 2024-08-23 DIAGNOSIS — I10 PRIMARY HYPERTENSION: ICD-10-CM

## 2024-08-23 DIAGNOSIS — R79.89 ELEVATED TROPONIN: ICD-10-CM

## 2024-08-23 DIAGNOSIS — I47.10 SVT (SUPRAVENTRICULAR TACHYCARDIA): ICD-10-CM

## 2024-08-23 DIAGNOSIS — Z96.652 AFTERCARE FOLLOWING LEFT KNEE JOINT REPLACEMENT SURGERY: Primary | ICD-10-CM

## 2024-08-23 DIAGNOSIS — R06.09 DYSPNEA ON EXERTION: ICD-10-CM

## 2024-08-23 DIAGNOSIS — Z47.1 AFTERCARE FOLLOWING LEFT KNEE JOINT REPLACEMENT SURGERY: Primary | ICD-10-CM

## 2024-08-23 RX ORDER — AZITHROMYCIN 600 MG/1
TABLET, FILM COATED ORAL
Qty: 20 TABLET | Refills: 0 | Status: SHIPPED | OUTPATIENT
Start: 2024-08-23 | End: 2024-08-27 | Stop reason: SDUPTHER

## 2024-08-23 RX ORDER — VALSARTAN 160 MG/1
160 TABLET ORAL DAILY
Qty: 90 TABLET | Refills: 1 | Status: SHIPPED | OUTPATIENT
Start: 2024-08-23

## 2024-08-25 DIAGNOSIS — E03.9 ACQUIRED HYPOTHYROIDISM: ICD-10-CM

## 2024-08-25 DIAGNOSIS — I10 PRIMARY HYPERTENSION: ICD-10-CM

## 2024-08-25 DIAGNOSIS — R06.09 DYSPNEA ON EXERTION: ICD-10-CM

## 2024-08-25 DIAGNOSIS — R79.89 ELEVATED TROPONIN: ICD-10-CM

## 2024-08-25 DIAGNOSIS — I47.10 SVT (SUPRAVENTRICULAR TACHYCARDIA): ICD-10-CM

## 2024-08-26 DIAGNOSIS — Z47.1 AFTERCARE FOLLOWING LEFT KNEE JOINT REPLACEMENT SURGERY: ICD-10-CM

## 2024-08-26 DIAGNOSIS — Z96.652 AFTERCARE FOLLOWING LEFT KNEE JOINT REPLACEMENT SURGERY: ICD-10-CM

## 2024-08-26 RX ORDER — METOPROLOL SUCCINATE 50 MG/1
50 TABLET, EXTENDED RELEASE ORAL DAILY
Qty: 30 TABLET | Refills: 0 | Status: SHIPPED | OUTPATIENT
Start: 2024-08-26

## 2024-08-26 RX ORDER — LEVOTHYROXINE SODIUM 75 UG/1
75 TABLET ORAL DAILY
Qty: 90 TABLET | Refills: 1 | Status: SHIPPED | OUTPATIENT
Start: 2024-08-26

## 2024-08-26 RX ORDER — HYDROCHLOROTHIAZIDE 25 MG/1
25 TABLET ORAL DAILY
Qty: 30 TABLET | Refills: 0 | Status: SHIPPED | OUTPATIENT
Start: 2024-08-26

## 2024-08-27 DIAGNOSIS — Z47.1 AFTERCARE FOLLOWING LEFT KNEE JOINT REPLACEMENT SURGERY: ICD-10-CM

## 2024-08-27 DIAGNOSIS — Z96.652 AFTERCARE FOLLOWING LEFT KNEE JOINT REPLACEMENT SURGERY: ICD-10-CM

## 2024-08-27 RX ORDER — AZITHROMYCIN 600 MG/1
TABLET, FILM COATED ORAL
Qty: 20 TABLET | Refills: 0 | Status: SHIPPED | OUTPATIENT
Start: 2024-08-27 | End: 2024-08-27

## 2024-08-27 RX ORDER — AZITHROMYCIN 500 MG/1
TABLET, FILM COATED ORAL
Qty: 20 TABLET | Refills: 0 | Status: SHIPPED | OUTPATIENT
Start: 2024-08-27 | End: 2024-09-03

## 2024-08-27 NOTE — TELEPHONE ENCOUNTER
Refill must be reviewed and completed by the office or provider. The refill is unable to be approved or denied by the medication management team.    Off protocol       Product Backordered/Unavailable.

## 2024-08-27 NOTE — TELEPHONE ENCOUNTER
E-Prescribing Status    Outpatient Medication Detail      azithromycin (ZITHROMAX) 500 MG tablet        Sig: Take 2 tablets 1 hour prior to any dental procedures or cleanings        Sent to pharmacy as: Azithromycin 500 MG Oral Tablet (ZITHROMAX)        Class: Normal        E-Prescribing Status: Transmission to pharmacy failed (8/27/2024  8:09 AM EDT)

## 2024-08-29 ENCOUNTER — HOSPITAL ENCOUNTER (OUTPATIENT)
Dept: BONE DENSITY | Facility: IMAGING CENTER | Age: 64
Discharge: HOME | End: 2024-08-29
Payer: MEDICARE

## 2024-08-29 VITALS — WEIGHT: 198.4 LBS | BODY MASS INDEX: 31.88 KG/M2 | HEIGHT: 66 IN

## 2024-08-29 DIAGNOSIS — Z47.1 AFTERCARE FOLLOWING LEFT KNEE JOINT REPLACEMENT SURGERY: Primary | ICD-10-CM

## 2024-08-29 DIAGNOSIS — Z13.820 ENCOUNTER FOR SCREENING FOR OSTEOPOROSIS: ICD-10-CM

## 2024-08-29 DIAGNOSIS — Z96.652 AFTERCARE FOLLOWING LEFT KNEE JOINT REPLACEMENT SURGERY: Primary | ICD-10-CM

## 2024-08-29 DIAGNOSIS — N95.9 UNSPECIFIED MENOPAUSAL AND PERIMENOPAUSAL DISORDER: ICD-10-CM

## 2024-08-29 PROCEDURE — 77080 DXA BONE DENSITY AXIAL: CPT

## 2024-08-29 RX ORDER — CEPHALEXIN 500 MG/1
CAPSULE ORAL
Qty: 20 CAPSULE | Refills: 2 | Status: CANCELLED | OUTPATIENT
Start: 2024-08-29 | End: 2026-12-01

## 2024-08-29 RX ORDER — CEPHALEXIN 500 MG/1
CAPSULE ORAL
Qty: 40 CAPSULE | Refills: 0 | Status: SHIPPED | OUTPATIENT
Start: 2024-08-29 | End: 2026-08-26

## 2024-09-05 DIAGNOSIS — Z96.652 AFTERCARE FOLLOWING LEFT KNEE JOINT REPLACEMENT SURGERY: Primary | ICD-10-CM

## 2024-09-05 DIAGNOSIS — Z47.1 AFTERCARE FOLLOWING LEFT KNEE JOINT REPLACEMENT SURGERY: Primary | ICD-10-CM

## 2024-09-20 DIAGNOSIS — R06.09 DYSPNEA ON EXERTION: ICD-10-CM

## 2024-09-20 DIAGNOSIS — I47.10 SVT (SUPRAVENTRICULAR TACHYCARDIA) (HCC): ICD-10-CM

## 2024-09-20 DIAGNOSIS — R79.89 ELEVATED TROPONIN: ICD-10-CM

## 2024-09-20 DIAGNOSIS — I10 PRIMARY HYPERTENSION: ICD-10-CM

## 2024-09-23 DIAGNOSIS — R79.89 ELEVATED TROPONIN: ICD-10-CM

## 2024-09-23 DIAGNOSIS — I10 PRIMARY HYPERTENSION: ICD-10-CM

## 2024-09-23 DIAGNOSIS — I47.10 SVT (SUPRAVENTRICULAR TACHYCARDIA) (HCC): ICD-10-CM

## 2024-09-23 DIAGNOSIS — R06.09 DYSPNEA ON EXERTION: ICD-10-CM

## 2024-09-23 DIAGNOSIS — I47.10 SVT (SUPRAVENTRICULAR TACHYCARDIA) (HCC): Primary | ICD-10-CM

## 2024-09-23 RX ORDER — HYDROCHLOROTHIAZIDE 25 MG/1
25 TABLET ORAL DAILY
Qty: 90 TABLET | Refills: 3 | Status: SHIPPED | OUTPATIENT
Start: 2024-09-23

## 2024-09-23 RX ORDER — METOPROLOL SUCCINATE 50 MG/1
50 TABLET, EXTENDED RELEASE ORAL DAILY
Qty: 30 TABLET | Refills: 0 | Status: SHIPPED | OUTPATIENT
Start: 2024-09-23

## 2024-09-23 RX ORDER — METOPROLOL SUCCINATE 50 MG/1
50 TABLET, EXTENDED RELEASE ORAL DAILY
Qty: 90 TABLET | Refills: 3 | Status: SHIPPED | OUTPATIENT
Start: 2024-09-23 | End: 2024-09-24 | Stop reason: SDUPTHER

## 2024-09-24 RX ORDER — METOPROLOL SUCCINATE 50 MG/1
50 TABLET, EXTENDED RELEASE ORAL DAILY
Qty: 90 TABLET | Refills: 3 | Status: SHIPPED | OUTPATIENT
Start: 2024-09-24

## 2024-09-24 NOTE — TELEPHONE ENCOUNTER
Transmission to pharmacy error occurred. Medication resent to pharmacy. Receipt confirmed by pharmacy.

## 2024-09-25 ENCOUNTER — OFFICE VISIT (OUTPATIENT)
Age: 64
End: 2024-09-25
Payer: MEDICARE

## 2024-09-25 VITALS
WEIGHT: 199 LBS | BODY MASS INDEX: 31.98 KG/M2 | DIASTOLIC BLOOD PRESSURE: 78 MMHG | HEART RATE: 79 BPM | SYSTOLIC BLOOD PRESSURE: 132 MMHG | HEIGHT: 66 IN | OXYGEN SATURATION: 96 % | RESPIRATION RATE: 19 BRPM

## 2024-09-25 DIAGNOSIS — J20.9 ACUTE BRONCHITIS, UNSPECIFIED ORGANISM: Primary | ICD-10-CM

## 2024-09-25 DIAGNOSIS — F41.9 ANXIETY: ICD-10-CM

## 2024-09-25 PROCEDURE — G2211 COMPLEX E/M VISIT ADD ON: HCPCS | Performed by: INTERNAL MEDICINE

## 2024-09-25 PROCEDURE — 99214 OFFICE O/P EST MOD 30 MIN: CPT | Performed by: INTERNAL MEDICINE

## 2024-09-25 RX ORDER — PAROXETINE 10 MG/1
10 TABLET, FILM COATED ORAL DAILY
Qty: 90 TABLET | Refills: 1 | Status: SHIPPED | OUTPATIENT
Start: 2024-09-25

## 2024-09-25 RX ORDER — DOXYCYCLINE HYCLATE 100 MG
100 TABLET ORAL 2 TIMES DAILY
Qty: 14 TABLET | Refills: 0 | Status: SHIPPED | OUTPATIENT
Start: 2024-09-25 | End: 2024-10-02

## 2024-09-25 NOTE — PROGRESS NOTES
Ambulatory Visit  Name: Delia Morin      : 1960      MRN: [id number]  Encounter Provider: Mckenna Oh DO  Encounter Date: 2024   Encounter department: [facility] INTERNAL MEDICINE    Assessment & Plan  Acute bronchitis, unspecified organism  -productive cough, no fever  -at this time, can start on doxycycline 100mg bid x7d, SE discussed.  -use nasal spray.  Orders:    doxycycline hyclate (VIBRA-TABS) 100 mg tablet; Take 1 tablet (100 mg total) by mouth 2 (two) times a day for 7 days       History of Present Illness     HPI     Complains of cough, began 10d ago.  Initially had fever for one day and thought had sinus infection.  Took coracedin, symptoms improved and had head congestion.  Took mucinex with improvement but developed cough. Had teeth cleaning and took ppx cephlexin with some improvement.  Cough is minimally productive, Denies SOB    History obtained from : patient  Review of Systems   Constitutional:  Negative for fever.   HENT:  Positive for sneezing. Negative for congestion, postnasal drip, rhinorrhea and sore throat.    Respiratory:  Positive for cough (minimally productive). Negative for shortness of breath and wheezing.    Gastrointestinal:  Negative for abdominal pain, constipation, diarrhea, nausea and vomiting.   Neurological:  Negative for dizziness and headaches.     Medical History Reviewed by provider this encounter:       Current Outpatient Medications on File Prior to Visit   Medication Sig Dispense Refill    acetaminophen (TYLENOL) 650 mg CR tablet Take 1 tablet (650 mg total) by mouth every 8 (eight) hours as needed for mild pain 30 tablet 0    ascorbic acid (VITAMIN C) 500 MG tablet Take 1 tablet (500 mg total) by mouth 2 (two) times a day 60 tablet 2    Calcium Carb-Cholecalciferol 600-400 MG-UNIT TABS Take by mouth      cephalexin (KEFLEX) 500 mg capsule 4 tablets 1 hour before dental visit repeat is necessary 40 capsule 0    cyanocobalamin (VITAMIN  B-12) 1000 MCG tablet Take 1,000 mcg by mouth in the morning      ezetimibe (ZETIA) 10 mg tablet Take 1 tablet (10 mg total) by mouth in the morning 90 tablet 3    gabapentin (Neurontin) 100 mg capsule Take 1 capsule (100 mg total) by mouth 3 (three) times a day 90 capsule 0    hydroCHLOROthiazide 25 mg tablet TAKE 1 TABLET (25 MG TOTAL) BY MOUTH DAILY. 90 tablet 3    levothyroxine 75 mcg tablet TAKE 1 TABLET BY MOUTH EVERY DAY 90 tablet 1    metoprolol succinate (TOPROL-XL) 50 mg 24 hr tablet Take 1 tablet (50 mg total) by mouth daily 30 tablet 0    metoprolol succinate (TOPROL-XL) 50 mg 24 hr tablet Take 1 tablet (50 mg total) by mouth daily 90 tablet 3    Multiple Vitamins-Minerals (multivitamin with minerals) tablet Take 1 tablet by mouth daily 30 tablet 2    PARoxetine (PAXIL) 10 mg tablet Take 1 tablet (10 mg total) by mouth daily 90 tablet 0    triamcinolone (KENALOG) 0.1 % cream       valsartan (DIOVAN) 160 mg tablet TAKE 1 TABLET BY MOUTH EVERY DAY 90 tablet 1    ascorbic acid (VITAMIN C) 500 mg tablet Take 1 tablet (500 mg total) by mouth 2 (two) times a day for 60 doses 60 tablet 0    ferrous sulfate 324 (65 Fe) mg Take 1 tablet (324 mg total) by mouth daily before breakfast (Patient not taking: Reported on 6/5/2024) 30 tablet 2    ferrous sulfate 325 (65 Fe) mg tablet Take 325 mg by mouth daily with breakfast (Patient not taking: Reported on 6/5/2024)      folic acid (FOLVITE) 1 mg tablet Take 1 mg by mouth in the morning (Patient not taking: Reported on 6/5/2024)      folic acid (FOLVITE) 1 mg tablet Take 1 tablet (1 mg total) by mouth daily (Patient not taking: Reported on 6/5/2024) 30 tablet 2    omeprazole (PriLOSEC) 40 MG capsule Take 1 capsule (40 mg total) by mouth 2 (two) times a day for 30 days, THEN 1 capsule (40 mg total) daily. 90 capsule 3     No current facility-administered medications on file prior to visit.      Social History     Tobacco Use    Smoking status: Never    Smokeless  "tobacco: Never   Vaping Use    Vaping status: Never Used   Substance and Sexual Activity    Alcohol use: Yes     Alcohol/week: 7.0 standard drinks of alcohol     Types: 7 Glasses of wine per week     Comment: social    Drug use: Never    Sexual activity: Yes     Partners: Male         Objective     /78 (BP Location: Left arm, Patient Position: Sitting, Cuff Size: Large)   Pulse 79   Resp 19   Ht 5' 6\" (1.676 m)   Wt 90.3 kg (199 lb)   SpO2 96%   BMI 32.12 kg/m²     Physical Exam  Vitals reviewed.   Constitutional:       General: She is not in acute distress.  HENT:      Head: Normocephalic.      Right Ear: Tympanic membrane and ear canal normal.      Left Ear: Tympanic membrane and ear canal normal.      Nose: Rhinorrhea present.      Mouth/Throat:      Mouth: Mucous membranes are moist.   Cardiovascular:      Rate and Rhythm: Normal rate and regular rhythm.      Pulses: Normal pulses.      Heart sounds: Normal heart sounds.   Pulmonary:      Effort: Pulmonary effort is normal. No respiratory distress.      Breath sounds: No wheezing.      Comments: Moist cough  Musculoskeletal:      Cervical back: No tenderness.   Lymphadenopathy:      Cervical: No cervical adenopathy.   Neurological:      Mental Status: She is alert and oriented to person, place, and time.           "

## 2024-09-26 ENCOUNTER — HOSPITAL ENCOUNTER (OUTPATIENT)
Dept: RADIOLOGY | Facility: HOSPITAL | Age: 64
Discharge: HOME | End: 2024-09-26
Attending: ORTHOPAEDIC SURGERY
Payer: MEDICARE

## 2024-09-26 ENCOUNTER — PREP FOR PROCEDURE (OUTPATIENT)
Dept: OBGYN CLINIC | Facility: HOSPITAL | Age: 64
End: 2024-09-26

## 2024-09-26 ENCOUNTER — OFFICE VISIT (OUTPATIENT)
Dept: OBGYN CLINIC | Facility: HOSPITAL | Age: 64
End: 2024-09-26
Payer: MEDICARE

## 2024-09-26 VITALS
WEIGHT: 198 LBS | HEART RATE: 76 BPM | BODY MASS INDEX: 31.82 KG/M2 | HEIGHT: 66 IN | SYSTOLIC BLOOD PRESSURE: 132 MMHG | DIASTOLIC BLOOD PRESSURE: 80 MMHG

## 2024-09-26 DIAGNOSIS — Z47.1 AFTERCARE FOLLOWING LEFT KNEE JOINT REPLACEMENT SURGERY: ICD-10-CM

## 2024-09-26 DIAGNOSIS — Z01.810 PRE-OPERATIVE CARDIOVASCULAR EXAMINATION: ICD-10-CM

## 2024-09-26 DIAGNOSIS — Z96.652 AFTERCARE FOLLOWING LEFT KNEE JOINT REPLACEMENT SURGERY: Primary | ICD-10-CM

## 2024-09-26 DIAGNOSIS — Z96.652 AFTERCARE FOLLOWING LEFT KNEE JOINT REPLACEMENT SURGERY: ICD-10-CM

## 2024-09-26 DIAGNOSIS — M17.11 PRIMARY OSTEOARTHRITIS OF RIGHT KNEE: ICD-10-CM

## 2024-09-26 DIAGNOSIS — G89.29 CHRONIC PAIN OF RIGHT KNEE: ICD-10-CM

## 2024-09-26 DIAGNOSIS — Z01.812 PRE-OPERATIVE LABORATORY EXAMINATION: ICD-10-CM

## 2024-09-26 DIAGNOSIS — Z47.1 AFTERCARE FOLLOWING RIGHT KNEE JOINT REPLACEMENT SURGERY: ICD-10-CM

## 2024-09-26 DIAGNOSIS — Z47.1 AFTERCARE FOLLOWING LEFT KNEE JOINT REPLACEMENT SURGERY: Primary | ICD-10-CM

## 2024-09-26 DIAGNOSIS — Z96.651 AFTERCARE FOLLOWING RIGHT KNEE JOINT REPLACEMENT SURGERY: ICD-10-CM

## 2024-09-26 DIAGNOSIS — M25.561 CHRONIC PAIN OF RIGHT KNEE: ICD-10-CM

## 2024-09-26 PROCEDURE — 73560 X-RAY EXAM OF KNEE 1 OR 2: CPT

## 2024-09-26 PROCEDURE — 99214 OFFICE O/P EST MOD 30 MIN: CPT | Performed by: ORTHOPAEDIC SURGERY

## 2024-09-26 RX ORDER — FOLIC ACID 1 MG/1
1 TABLET ORAL DAILY
Qty: 30 TABLET | Refills: 0 | Status: SHIPPED | OUTPATIENT
Start: 2024-09-26

## 2024-09-26 RX ORDER — ENOXAPARIN SODIUM 100 MG/ML
40 INJECTION SUBCUTANEOUS DAILY
Qty: 11.2 ML | Refills: 0 | Status: SHIPPED | OUTPATIENT
Start: 2024-09-26 | End: 2024-10-24

## 2024-09-26 RX ORDER — ASCORBIC ACID 500 MG
500 TABLET ORAL 2 TIMES DAILY
Qty: 60 TABLET | Refills: 0 | Status: SHIPPED | OUTPATIENT
Start: 2024-09-26

## 2024-09-26 RX ORDER — FERROUS SULFATE 324(65)MG
TABLET, DELAYED RELEASE (ENTERIC COATED) ORAL
Qty: 30 TABLET | Refills: 0 | Status: SHIPPED | OUTPATIENT
Start: 2024-09-26

## 2024-09-26 NOTE — PROGRESS NOTES
Assessment:   Diagnosis ICD-10-CM Associated Orders   1. Aftercare following left knee joint replacement surgery  Z47.1     Z96.652       2. Primary osteoarthritis of right knee  M17.11       3. Chronic pain of right knee  M25.561     G89.29       4. Pre-operative laboratory examination  Z01.812       5. Pre-operative cardiovascular examination  Z01.810       6. Aftercare following right knee joint replacement surgery  Z47.1     Z96.651           Plan:  Diagnostics reviewed and physical exam performed.  Diagnosis, treatment options and associated risks were discussed with the patient including no treatment, nonsurgical treatment and potential for surgical intervention.  The patient was given the opportunity to ask questions regarding each.  Several months status post left total knee arthroplasty and overall doing well.   In regards to her right knee known osteoarthritis.  Quality of life decision to pursue elective right total knee arthroplasty.  Risks and benefits of right total knee arthroplasty were discussed.  Consents obtained. Case request was placed 4/10  Preoperative vitamins and postoperative Lovenox sent to her pharmacy of record.    To do next visit:  Return for post-op with x-rays upon arrival right knee.    The above stated was discussed in layman's terms and the patient expressed understanding.  All questions were answered to the patient's satisfaction.       Scribe Attestation      I,:  Sal Campbell am acting as a scribe while in the presence of the attending physician.:       I,:  Ignacio Leblanc MD personally performed the services described in this documentation    as scribed in my presence.:               Subjective:   Delia Morin is a 64 y.o. female who presents today for repeat evaluation of her bilateral knees.  She is 7 months status post left total knee arthroplasty and overall doing very well and is pleased.  She states that the surgery has improved her overall function however  her right knee has been limiting her.  She has osteoarthritis and chronic pain at her right knee.  Her symptoms at her right knee increased with weightbearing activities.  She is stiffness getting up with her prolonged sedentary positions.  Pain scale 9/10 at times she finds that her right knee symptoms do limit her day-to-day activities as well as impedes on her quality of life.  She previously had injections of cortisone with minimal lasting relief.  She is tentatively scheduled for an elective right total knee arthroplasty 11/4.  She has a PCP preop clearance appointment 10/11    She will be out of town the 2 weeks prior to her November 4th surgical date      Review of systems negative unless otherwise specified in HPI  Review of Systems    Past Medical History:   Diagnosis Date    Disease of thyroid gland     GERD (gastroesophageal reflux disease)     History of hepatitis B     Hyperlipidemia     Hypertension        Past Surgical History:   Procedure Laterality Date    ABCESS DRAINAGE      tonsil at age 3    COLONOSCOPY      SD ARTHRP KNE CONDYLE&PLATU MEDIAL&LAT COMPARTMENTS Left 2/28/2024    Procedure: ARTHROPLASTY KNEE TOTAL W ROBOT;  Surgeon: Ignacio Leblanc MD;  [facility];  Service: Orthopedics       No family history on file.    Social History     Occupational History    Not on file   Tobacco Use    Smoking status: Never    Smokeless tobacco: Never   Vaping Use    Vaping status: Never Used   Substance and Sexual Activity    Alcohol use: Yes     Alcohol/week: 7.0 standard drinks of alcohol     Types: 7 Glasses of wine per week     Comment: social    Drug use: Never    Sexual activity: Yes     Partners: Male         Current Outpatient Medications:     acetaminophen (TYLENOL) 650 mg CR tablet, Take 1 tablet (650 mg total) by mouth every 8 (eight) hours as needed for mild pain, Disp: 30 tablet, Rfl: 0    ascorbic acid (VITAMIN C) 500 mg tablet, Take 1 tablet (500 mg total) by mouth 2 (two)  times a day for 60 doses, Disp: 60 tablet, Rfl: 0    ascorbic acid (VITAMIN C) 500 MG tablet, Take 1 tablet (500 mg total) by mouth 2 (two) times a day, Disp: 60 tablet, Rfl: 2    Calcium Carb-Cholecalciferol 600-400 MG-UNIT TABS, Take by mouth, Disp: , Rfl:     cephalexin (KEFLEX) 500 mg capsule, 4 tablets 1 hour before dental visit repeat is necessary, Disp: 40 capsule, Rfl: 0    cyanocobalamin (VITAMIN B-12) 1000 MCG tablet, Take 1,000 mcg by mouth in the morning, Disp: , Rfl:     doxycycline hyclate (VIBRA-TABS) 100 mg tablet, Take 1 tablet (100 mg total) by mouth 2 (two) times a day for 7 days, Disp: 14 tablet, Rfl: 0    ezetimibe (ZETIA) 10 mg tablet, Take 1 tablet (10 mg total) by mouth in the morning, Disp: 90 tablet, Rfl: 3    ferrous sulfate 324 (65 Fe) mg, Take 1 tablet (324 mg total) by mouth daily before breakfast (Patient not taking: Reported on 6/5/2024), Disp: 30 tablet, Rfl: 2    ferrous sulfate 325 (65 Fe) mg tablet, Take 325 mg by mouth daily with breakfast (Patient not taking: Reported on 6/5/2024), Disp: , Rfl:     folic acid (FOLVITE) 1 mg tablet, Take 1 mg by mouth in the morning (Patient not taking: Reported on 6/5/2024), Disp: , Rfl:     folic acid (FOLVITE) 1 mg tablet, Take 1 tablet (1 mg total) by mouth daily (Patient not taking: Reported on 6/5/2024), Disp: 30 tablet, Rfl: 2    gabapentin (Neurontin) 100 mg capsule, Take 1 capsule (100 mg total) by mouth 3 (three) times a day, Disp: 90 capsule, Rfl: 0    hydroCHLOROthiazide 25 mg tablet, TAKE 1 TABLET (25 MG TOTAL) BY MOUTH DAILY., Disp: 90 tablet, Rfl: 3    levothyroxine 75 mcg tablet, TAKE 1 TABLET BY MOUTH EVERY DAY, Disp: 90 tablet, Rfl: 1    metoprolol succinate (TOPROL-XL) 50 mg 24 hr tablet, Take 1 tablet (50 mg total) by mouth daily, Disp: 30 tablet, Rfl: 0    metoprolol succinate (TOPROL-XL) 50 mg 24 hr tablet, Take 1 tablet (50 mg total) by mouth daily, Disp: 90 tablet, Rfl: 3    Multiple Vitamins-Minerals (multivitamin with  minerals) tablet, Take 1 tablet by mouth daily, Disp: 30 tablet, Rfl: 2    omeprazole (PriLOSEC) 40 MG capsule, Take 1 capsule (40 mg total) by mouth 2 (two) times a day for 30 days, THEN 1 capsule (40 mg total) daily., Disp: 90 capsule, Rfl: 3    PARoxetine (PAXIL) 10 mg tablet, Take 1 tablet (10 mg total) by mouth daily, Disp: 90 tablet, Rfl: 1    triamcinolone (KENALOG) 0.1 % cream, , Disp: , Rfl:     valsartan (DIOVAN) 160 mg tablet, TAKE 1 TABLET BY MOUTH EVERY DAY, Disp: 90 tablet, Rfl: 1    Allergies   Allergen Reactions    Amlodipine Swelling and Other (See Comments)     Ankle swelling      Atorvastatin Cough and Myalgia     Cough    Penicillins Rash    Sulfa Antibiotics Rash            Vitals:    09/26/24 1453   BP: 132/80   Pulse: 76       Body mass index is 31.96 kg/m².  Wt Readings from Last 3 Encounters:   09/26/24 89.8 kg (198 lb)   09/25/24 90.3 kg (199 lb)   08/29/24 90 kg (198 lb 6.4 oz)       Objective:                    Right Knee Exam     Muscle Strength   The patient has normal right knee strength.    Tenderness   The patient is experiencing tenderness in the lateral joint line.    Range of Motion   Extension:  0   Flexion:  120 (Mild crepitation and stiffness with flexion)     Other   Erythema: absent  Sensation: normal  Swelling: mild  Effusion: effusion (trace) present    Comments:    Intact extensor mechanism.  Mild valgus alignment      Left Knee Exam     Range of Motion   Extension:  0   Flexion:  120 (Patella tracks well)     Other   Erythema: absent  Swelling: mild  Effusion: no effusion present    Comments:    Intact extensor mechanism with a well-healed anterior incision.  Improved alignment.  Stable to varus and valgus stressing in extension and mid flexion.  Minimal warmth at most.  No signs of infection  Calf and thigh are soft and nontender no signs DVT            Diagnostics, reviewed and taken today if performed as documented:    The attending physician has personally reviewed  "the pertinent films in PACS and interpretation is as follows:    Left knee x-rays taken and reviewed in the office today and show: Well aligned, position, bonded left total knee prosthesis without signs of loosening or stress shielding      Procedures, if performed today:    Procedures    None performed      Portions of the record may have been created with voice recognition software.  Occasional wrong word or \"sound a like\" substitutions may have occurred due to the inherent limitations of voice recognition software.  Read the chart carefully and recognize, using context, where substitutions have occurred.  "

## 2024-10-01 ENCOUNTER — TELEPHONE (OUTPATIENT)
Dept: OBGYN CLINIC | Facility: HOSPITAL | Age: 64
End: 2024-10-01

## 2024-10-10 NOTE — PROGRESS NOTES
[facility]  OFF[facility]IT     PATIENT INFORMATION     Delia Morin   64 y.o. female   MRN: [id number]    ASSESSMENT/PLAN     Assessment & Plan  Pre-op examination  Patient presents to clinic today for preoperative examination for right total knee arthroplasty scheduled on 11/04/2024.  Patient was seen in the orthopedics office most recently on 09/26/2024.    At this time she is several months status post left total knee arthroplasty that she had done in February 2024 and doing well.    She had previously undergone corticosteroid injections with minimal lasting relief.  She will undergo right total knee arthroplasty with the same surgeon who performed her left total knee arthroplasty.  She has no questions or concerns about today's procedure.  Reviewed patient's chronic medical conditions and medications at this time.  Reviewed patient's recent blood work including CBC, CMP.  Patient's RCRI score 0 correlating to 3.9% risk of major adverse cardiac event in 30-day postoperative period.  Patient understands and accepts this risk.  Patient is at low risk for moderate risk procedure.  Patient medically optimized at this time.  Patient may proceed with scheduled right knee arthroplasty in 11/0/24 without any further testing or imaging.    Risk Factor Score (Yes=1, No=0)   Hx of TIA/CVA 0   Hx of prior ischemic heart disease (AMI, unstable angina, Q waves on EKG, CABG) 0   Hx of congestive heart failure 0   Serum Creatinine >2 mg/dl 0   Insulin dependent diabetes mellitus 0   Total Score 0     Risk of MACE (30-day)     Points Risk % (95% CI), Blu, 2017 Risk % (95% CI) Thuan, 1999   0 3.9 (2.8-5.4) 0.4 (0.05-1.5)   1 6 (4.9-7.4) 0.9 (0.3-2.1)   2 10.1 (8.1-12.6) 6.6 (3.9-10.3)   3 or more 15 (11.1-20) >11 (5.8-18.4)       Advice    In patients with elevated risk (RCRI >/= 2), assess functional capacity (METs/DASI).   If >4 METs functional capacity, may proceed to surgery. If unknown/poor  (<4 METs) functional capacity consider, may need preoperative cardiac testing depending on symptoms and if testing will .       Primary osteoarthritis of right knee  Patient was seen in the orthopedics office most recently on 09/26/2024.    At this time she is several months status post left total knee arthroplasty that she had done in February 2024 and doing well.    She has elected to pursue right total knee arthroplasty for quality of life improvements.    She has symptoms such as pain in her right knee with weightbearing activities.  And she states that her right knee symptoms limit her day-to-day activities and impedes on her quality of life.    She had previously undergone corticosteroid injections with minimal lasting relief.  Patient is scheduled for right total knee arthroplasty on 11/0/24.    Orders:    Ambulatory referral to Family Practice    Pre-operative laboratory examination  Preoperative lab testing reviewed.  No further imaging required at this time.  Orders:    Ambulatory referral to Family Practice    SVT (supraventricular tachycardia) (HCC)  Patient's heart regular rate and rhythm on examination today.  Reviewed patient's previous EKG.  Patient denies any new or ongoing symptoms such as chest pain, palpitations, shortness of breath, lower extremity edema.  Patient has no known history of CAD or CHF.  SVT stable at this time.            HEALTH MAINTENANCE     Immunization History   Administered Date(s) Administered    COVID-19 PFIZER VACCINE 0.3 ML IM 03/07/2021, 03/27/2021, 11/29/2021    COVID-19 Pfizer Vac BIVALENT Gio-sucrose 12 Yr+ IM 01/11/2023    INFLUENZA 11/11/2020, 11/15/2021, 10/11/2022    Tdap 08/22/2020    Zoster Vaccine Recombinant 01/14/2021, 12/08/2021         CHIEF COMPLAINT     Chief Complaint   Patient presents with    Pre-op Exam      HISTORY OF PRESENT ILLNESS     Ms. Delia Morin is a 64-year-old female with past medical history of SVT, hypertension,  hepatic steatosis, GERD, hypothyroidism depression hyperlipidemia, primary osteoarthritis of both knees status post left total knee arthroplasty.  Patient presents to clinic today for preoperative examination for right total knee arthroplasty scheduled on 11/0/2024.    Patient was seen in the orthopedics office most recently on 09/26/2024.  At this time she is several months status post left total knee arthroplasty that she had done in February 2024 and doing well.  She is elected to pursue right total knee arthroplasty for quality of life improvements.  She has symptoms such as pain in her right knee with weightbearing activities.  And she states that her right knee symptoms limit her day-to-day activities and impedes on her quality of life.  She had previously undergone corticosteroid injections with minimal lasting relief.    Patient completed preoperative blood work on 10/11/2024. Complete metabolic panel shows normal electrolytes, normal LFTs, and kidney function stable.  CBC is without anemia some chronic macrocytosis is noted.  An anemia panel is in process.  PT/INR and PTT normal.  Hemoglobin A1c is in process though previous A1c normal and patient without history of diabetes.    REVIEW OF SYSTEMS     Review of Systems   Constitutional:  Negative for chills and fever.   HENT:  Negative for congestion, rhinorrhea and sore throat.    Respiratory:  Negative for cough, shortness of breath and wheezing.    Cardiovascular:  Negative for chest pain and leg swelling.   Gastrointestinal:  Negative for abdominal distention, abdominal pain, constipation, diarrhea, nausea and vomiting.   Genitourinary:  Negative for difficulty urinating and dysuria.   Musculoskeletal:  Negative for arthralgias and back pain.   Skin:  Negative for rash and wound.   Neurological:  Negative for dizziness, syncope, weakness, light-headedness and headaches.   Psychiatric/Behavioral:  Negative for agitation, behavioral problems and confusion.  "     OBJECTIVE     Vitals:    10/11/24 1523   BP: 126/76   Pulse: 98   Resp: 16   SpO2: 95%   Weight: 90.3 kg (199 lb)   Height: 5' 6\" (1.676 m)     Physical Exam  Constitutional:       Appearance: Normal appearance.   HENT:      Right Ear: External ear normal.      Left Ear: External ear normal.   Cardiovascular:      Rate and Rhythm: Normal rate and regular rhythm.      Pulses: Normal pulses.      Heart sounds: Normal heart sounds. No murmur heard.  Pulmonary:      Effort: Pulmonary effort is normal. No respiratory distress.      Breath sounds: Normal breath sounds. No wheezing, rhonchi or rales.   Abdominal:      General: Abdomen is flat. Bowel sounds are normal. There is no distension.      Palpations: Abdomen is soft.      Tenderness: There is no abdominal tenderness.   Musculoskeletal:         General: Tenderness (R knee) present.      Right lower leg: No edema.      Left lower leg: No edema.   Skin:     General: Skin is warm and dry.   Neurological:      Mental Status: She is alert and oriented to person, place, and time.   Psychiatric:         Mood and Affect: Mood normal.         Behavior: Behavior normal.         Thought Content: Thought content normal.       CURRENT MEDICATIONS     Current Outpatient Medications:     acetaminophen (TYLENOL) 650 mg CR tablet, Take 1 tablet (650 mg total) by mouth every 8 (eight) hours as needed for mild pain, Disp: 30 tablet, Rfl: 0    ascorbic acid (VITAMIN C) 500 mg tablet, Take 1 tablet (500 mg total) by mouth 2 (two) times a day for 60 doses, Disp: 60 tablet, Rfl: 0    ascorbic acid (VITAMIN C) 500 MG tablet, Take 1 tablet (500 mg total) by mouth 2 (two) times a day, Disp: 60 tablet, Rfl: 2    ascorbic acid (VITAMIN C) 500 MG tablet, Take 1 tablet (500 mg total) by mouth 2 (two) times a day, Disp: 60 tablet, Rfl: 0    Calcium Carb-Cholecalciferol 600-400 MG-UNIT TABS, Take by mouth, Disp: , Rfl:     cephalexin (KEFLEX) 500 mg capsule, 4 tablets 1 hour before dental " visit repeat is necessary, Disp: 40 capsule, Rfl: 0    cyanocobalamin (VITAMIN B-12) 1000 MCG tablet, Take 1,000 mcg by mouth in the morning, Disp: , Rfl:     enoxaparin (LOVENOX) 40 mg/0.4 mL, Inject 0.4 mL (40 mg total) under the skin daily for 28 days To start postoperatively, Disp: 11.2 mL, Rfl: 0    ezetimibe (ZETIA) 10 mg tablet, Take 1 tablet (10 mg total) by mouth in the morning, Disp: 90 tablet, Rfl: 3    ferrous sulfate 324 (65 Fe) mg, Take 1 tablet (324 mg total) by mouth daily before breakfast (Patient not taking: Reported on 6/5/2024), Disp: 30 tablet, Rfl: 2    ferrous sulfate 324 (65 Fe) mg, Take one tablet daily., Disp: 30 tablet, Rfl: 0    ferrous sulfate 325 (65 Fe) mg tablet, Take 325 mg by mouth daily with breakfast (Patient not taking: Reported on 6/5/2024), Disp: , Rfl:     folic acid (FOLVITE) 1 mg tablet, Take 1 mg by mouth in the morning (Patient not taking: Reported on 6/5/2024), Disp: , Rfl:     folic acid (FOLVITE) 1 mg tablet, Take 1 tablet (1 mg total) by mouth daily (Patient not taking: Reported on 6/5/2024), Disp: 30 tablet, Rfl: 2    folic acid (FOLVITE) 1 mg tablet, Take 1 tablet (1 mg total) by mouth daily, Disp: 30 tablet, Rfl: 0    gabapentin (Neurontin) 100 mg capsule, Take 1 capsule (100 mg total) by mouth 3 (three) times a day, Disp: 90 capsule, Rfl: 0    hydroCHLOROthiazide 25 mg tablet, TAKE 1 TABLET (25 MG TOTAL) BY MOUTH DAILY., Disp: 90 tablet, Rfl: 3    levothyroxine 75 mcg tablet, TAKE 1 TABLET BY MOUTH EVERY DAY, Disp: 90 tablet, Rfl: 1    metoprolol succinate (TOPROL-XL) 50 mg 24 hr tablet, Take 1 tablet (50 mg total) by mouth daily, Disp: 30 tablet, Rfl: 0    metoprolol succinate (TOPROL-XL) 50 mg 24 hr tablet, Take 1 tablet (50 mg total) by mouth daily, Disp: 90 tablet, Rfl: 3    Multiple Vitamins-Minerals (multivitamin with minerals) tablet, Take 1 tablet by mouth daily, Disp: 30 tablet, Rfl: 2    omeprazole (PriLOSEC) 40 MG capsule, Take 1 capsule (40 mg total) by  mouth 2 (two) times a day for 30 days, THEN 1 capsule (40 mg total) daily., Disp: 90 capsule, Rfl: 3    PARoxetine (PAXIL) 10 mg tablet, Take 1 tablet (10 mg total) by mouth daily, Disp: 90 tablet, Rfl: 1    triamcinolone (KENALOG) 0.1 % cream, , Disp: , Rfl:     valsartan (DIOVAN) 160 mg tablet, TAKE 1 TABLET BY MOUTH EVERY DAY, Disp: 90 tablet, Rfl: 1    PAST MEDICAL & SURGICAL HISTORY     Past Medical History:   Diagnosis Date    Disease of thyroid gland     GERD (gastroesophageal reflux disease)     History of hepatitis B     Hyperlipidemia     Hypertension      Past Surgical History:   Procedure Laterality Date    ABCESS DRAINAGE      tonsil at age 3    COLONOSCOPY      AK ARTHRP KNE CONDYLE&PLATU MEDIAL&LAT COMPARTMENTS Left 2/28/2024    Procedure: ARTHROPLASTY KNEE TOTAL W ROBOT;  Surgeon: Ignacio Leblanc MD;  Location: [facility];  Service: Orthopedics     SOCIAL & FAMILY HISTORY     Social History     Socioeconomic History    Marital status: /Civil Union     Spouse name: Not on file    Number of children: Not on file    Years of education: Not on file    Highest education level: Not on file   Occupational History    Not on file   Tobacco Use    Smoking status: Never    Smokeless tobacco: Never   Vaping Use    Vaping status: Never Used   Substance and Sexual Activity    Alcohol use: Yes     Alcohol/week: 7.0 standard drinks of alcohol     Types: 7 Glasses of wine per week     Comment: social    Drug use: Never    Sexual activity: Yes     Partners: Male   Other Topics Concern    Not on file   Social History Narrative    Not on file     Social Determinants of Health     Financial Resource Strain: Low Risk  (11/30/2023)    Received from [facility], [facility] and [facility]    Overall Financial Resource Strain (CARDIA)     Difficulty of Paying Living Expenses: Not  hard at all   Food Insecurity: No Food Insecurity (11/30/2023)    [facility]    Hunger Vital Sign     Worried About Running Out of Food in the Last Year: Never true     Ran Out of Food in the Last Year: Never true   Transportation Needs: No Transportation Needs (11/30/2023)    [facility], [facility]    PRAPARE - Transportation     Lack of Transportation (Medical): No     Lack of Transportation (Non-Medical): No   Physical Activity: Sufficiently Active (11/30/2023)    [facility]    Exercise Vital Sign     Days of Exercise per Week: 3 days     Minutes of Exercise per Session: 60 min   Stress: No Stress Concern Present (11/30/2023)    [facility]    [employer] - Occupational Stress Questionnaire     Feeling of Stress : Not at all   Social Connections: Socially Integrated (11/30/2023)    [facility], [facility]    Social Connection and Isolation Panel [NHANES]     Frequency of Communication with Friends and Family: More than three times a week     Frequency of Social Gatherings with Friends and Family: More than three times a week     Attends Buddhist  Services: More than 4 times per year     Active Member of Clubs or Organizations: Yes     Attends Club or Organization Meetings: More than 4 times per year     Marital Status:    Intimate Partner Violence: Low Risk  (8/7/2023)    [facility], [facility]    SDOH Do You Feel Safe At Home     Do you feel safe at home?: Yes   Housing Stability: Not on file     Social History     Substance and Sexual Activity   Alcohol Use Yes    Alcohol/week: 7.0 standard drinks of alcohol    Types: 7 Glasses of wine per week    Comment: social       Social History     Substance and Sexual Activity   Drug Use Never     Social History     Tobacco Use   Smoking Status Never   Smokeless Tobacco Never     History reviewed. No pertinent family history.  ==  Dennis Beckwith DO  [facility] Internal Medicine  [address] [place] [id number]  Office: [phone]  Fax: [phone]

## 2024-10-11 ENCOUNTER — CONSULT (OUTPATIENT)
Age: 64
End: 2024-10-11
Payer: MEDICARE

## 2024-10-11 ENCOUNTER — APPOINTMENT (OUTPATIENT)
Dept: LAB | Facility: CLINIC | Age: 64
End: 2024-10-11
Payer: MEDICARE

## 2024-10-11 VITALS
SYSTOLIC BLOOD PRESSURE: 126 MMHG | DIASTOLIC BLOOD PRESSURE: 76 MMHG | OXYGEN SATURATION: 95 % | HEIGHT: 66 IN | BODY MASS INDEX: 31.98 KG/M2 | HEART RATE: 98 BPM | WEIGHT: 199 LBS | RESPIRATION RATE: 16 BRPM

## 2024-10-11 DIAGNOSIS — Z01.812 PRE-OPERATIVE LABORATORY EXAMINATION: ICD-10-CM

## 2024-10-11 DIAGNOSIS — Z01.818 PRE-OP EXAMINATION: Primary | ICD-10-CM

## 2024-10-11 DIAGNOSIS — M24.19 OTHER ARTICULAR CARTILAGE DISORDERS, OTHER SPECIFIED SITE: ICD-10-CM

## 2024-10-11 DIAGNOSIS — M17.11 PRIMARY OSTEOARTHRITIS OF RIGHT KNEE: ICD-10-CM

## 2024-10-11 DIAGNOSIS — I47.10 SVT (SUPRAVENTRICULAR TACHYCARDIA) (HCC): ICD-10-CM

## 2024-10-11 DIAGNOSIS — M19.09 PRIMARY OSTEOARTHRITIS, OTHER SPECIFIED SITE: ICD-10-CM

## 2024-10-11 LAB
ALBUMIN SERPL BCG-MCNC: 4.1 G/DL (ref 3.5–5)
ALP SERPL-CCNC: 83 U/L (ref 34–104)
ALT SERPL W P-5'-P-CCNC: 30 U/L (ref 7–52)
ANION GAP SERPL CALCULATED.3IONS-SCNC: 11 MMOL/L (ref 4–13)
APTT PPP: 24 SECONDS (ref 23–34)
AST SERPL W P-5'-P-CCNC: 27 U/L (ref 13–39)
BASOPHILS # BLD AUTO: 0.08 THOUSANDS/ΜL (ref 0–0.1)
BASOPHILS NFR BLD AUTO: 1 % (ref 0–1)
BILIRUB SERPL-MCNC: 0.59 MG/DL (ref 0.2–1)
BUN SERPL-MCNC: 16 MG/DL (ref 5–25)
CALCIUM SERPL-MCNC: 9.5 MG/DL (ref 8.4–10.2)
CHLORIDE SERPL-SCNC: 99 MMOL/L (ref 96–108)
CO2 SERPL-SCNC: 28 MMOL/L (ref 21–32)
CREAT SERPL-MCNC: 0.97 MG/DL (ref 0.6–1.3)
EOSINOPHIL # BLD AUTO: 0.26 THOUSAND/ΜL (ref 0–0.61)
EOSINOPHIL NFR BLD AUTO: 5 % (ref 0–6)
ERYTHROCYTE [DISTWIDTH] IN BLOOD BY AUTOMATED COUNT: 13.2 % (ref 11.6–15.1)
EST. AVERAGE GLUCOSE BLD GHB EST-MCNC: 117 MG/DL
GFR SERPL CREATININE-BSD FRML MDRD: 61 ML/MIN/1.73SQ M
GLUCOSE P FAST SERPL-MCNC: 94 MG/DL (ref 65–99)
HBA1C MFR BLD: 5.7 %
HCT VFR BLD AUTO: 40.3 % (ref 34.8–46.1)
HGB BLD-MCNC: 13.5 G/DL (ref 11.5–15.4)
IMM GRANULOCYTES # BLD AUTO: 0.03 THOUSAND/UL (ref 0–0.2)
IMM GRANULOCYTES NFR BLD AUTO: 1 % (ref 0–2)
INR PPP: 0.92 (ref 0.85–1.19)
LYMPHOCYTES # BLD AUTO: 1.6 THOUSANDS/ΜL (ref 0.6–4.47)
LYMPHOCYTES NFR BLD AUTO: 28 % (ref 14–44)
MCH RBC QN AUTO: 35 PG (ref 26.8–34.3)
MCHC RBC AUTO-ENTMCNC: 33.5 G/DL (ref 31.4–37.4)
MCV RBC AUTO: 104 FL (ref 82–98)
MONOCYTES # BLD AUTO: 0.55 THOUSAND/ΜL (ref 0.17–1.22)
MONOCYTES NFR BLD AUTO: 10 % (ref 4–12)
NEUTROPHILS # BLD AUTO: 3.18 THOUSANDS/ΜL (ref 1.85–7.62)
NEUTS SEG NFR BLD AUTO: 55 % (ref 43–75)
NRBC BLD AUTO-RTO: 0 /100 WBCS
PLATELET # BLD AUTO: 290 THOUSANDS/UL (ref 149–390)
PMV BLD AUTO: 10.7 FL (ref 8.9–12.7)
POTASSIUM SERPL-SCNC: 4.6 MMOL/L (ref 3.5–5.3)
PROT SERPL-MCNC: 6.7 G/DL (ref 6.4–8.4)
PROTHROMBIN TIME: 12.7 SECONDS (ref 12.3–15)
RBC # BLD AUTO: 3.86 MILLION/UL (ref 3.81–5.12)
SODIUM SERPL-SCNC: 138 MMOL/L (ref 135–147)
WBC # BLD AUTO: 5.7 THOUSAND/UL (ref 4.31–10.16)

## 2024-10-11 PROCEDURE — 80053 COMPREHEN METABOLIC PANEL: CPT

## 2024-10-11 PROCEDURE — 99214 OFFICE O/P EST MOD 30 MIN: CPT | Performed by: STUDENT IN AN ORGANIZED HEALTH CARE EDUCATION/TRAINING PROGRAM

## 2024-10-11 PROCEDURE — 85730 THROMBOPLASTIN TIME PARTIAL: CPT

## 2024-10-11 PROCEDURE — 86901 BLOOD TYPING SEROLOGIC RH(D): CPT | Performed by: PHYSICIAN ASSISTANT

## 2024-10-11 PROCEDURE — 83036 HEMOGLOBIN GLYCOSYLATED A1C: CPT

## 2024-10-11 PROCEDURE — 86900 BLOOD TYPING SEROLOGIC ABO: CPT | Performed by: PHYSICIAN ASSISTANT

## 2024-10-11 PROCEDURE — 86850 RBC ANTIBODY SCREEN: CPT | Performed by: PHYSICIAN ASSISTANT

## 2024-10-11 PROCEDURE — 85025 COMPLETE CBC W/AUTO DIFF WBC: CPT

## 2024-10-11 PROCEDURE — 85610 PROTHROMBIN TIME: CPT

## 2024-10-11 PROCEDURE — 36415 COLL VENOUS BLD VENIPUNCTURE: CPT

## 2024-10-11 NOTE — LETTER
October 11, 2024     Jb Viramontes PA-C  [address]  [facility] [address]    Patient: Delia Morin   YOB: 1960   Date of Visit: 10/11/2024       Dear Dr. Viramontes:    Thank you for referring Delia Morin to me for evaluation. Below are my notes for this consultation.    If you have questions, please do not hesitate to call me. I look forward to following your patient along with you.         Sincerely,        Dennis Beckwith DO        CC: No Recipients    Dennis Beckwith DO  10/11/2024  3:45 PM  Sign when Signing Visit    [facility] INTERNAL MEDICINE- [place]  OFFICE VISIT     PATIENT INFORMATION     Delia Morin   64 y.o. female   MRN: [id number]    ASSESSMENT/PLAN     Assessment & Plan  Pre-op examination  Patient presents to clinic today for preoperative examination for right total knee arthroplasty scheduled on 11/04/2024.  Patient was seen in the orthopedics office most recently on 09/26/2024.    At this time she is several months status post left total knee arthroplasty that she had done in February 2024 and doing well.    She had previously undergone corticosteroid injections with minimal lasting relief.  She will undergo right total knee arthroplasty with the same surgeon who performed her left total knee arthroplasty.  She has no questions or concerns about today's procedure.  Reviewed patient's chronic medical conditions and medications at this time.  Reviewed patient's recent blood work including CBC, CMP.  Patient's RCRI score 0 correlating to 3.9% risk of major adverse cardiac event in 30-day postoperative period.  Patient understands and accepts this risk.  Patient is at low risk for moderate risk procedure.  Patient medically optimized at this time.  Patient may proceed with scheduled right knee arthroplasty in 11/0/24 without any further testing or imaging.    Risk Factor Score (Yes=1, No=0)   Hx of TIA/CVA 0   Hx of prior ischemic heart disease (AMI,  unstable angina, Q waves on EKG, CABG) 0   Hx of congestive heart failure 0   Serum Creatinine >2 mg/dl 0   Insulin dependent diabetes mellitus 0   Total Score 0     Risk of MACE (30-day)     Points Risk % (95% CI), Blu, 2017 Risk % (95% CI) Thuan, 1999   0 3.9 (2.8-5.4) 0.4 (0.05-1.5)   1 6 (4.9-7.4) 0.9 (0.3-2.1)   2 10.1 (8.1-12.6) 6.6 (3.9-10.3)   3 or more 15 (11.1-20) >11 (5.8-18.4)       Advice    In patients with elevated risk (RCRI >/= 2), assess functional capacity (METs/DASI).   If >4 METs functional capacity, may proceed to surgery. If unknown/poor (<4 METs) functional capacity consider, may need preoperative cardiac testing depending on symptoms and if testing will .       Primary osteoarthritis of right knee  Patient was seen in the orthopedics office most recently on 09/26/2024.    At this time she is several months status post left total knee arthroplasty that she had done in February 2024 and doing well.    She has elected to pursue right total knee arthroplasty for quality of life improvements.    She has symptoms such as pain in her right knee with weightbearing activities.  And she states that her right knee symptoms limit her day-to-day activities and impedes on her quality of life.    She had previously undergone corticosteroid injections with minimal lasting relief.  Patient is scheduled for right total knee arthroplasty on 11/0/24.    Orders:  •  Ambulatory referral to Family Practice    Pre-operative laboratory examination  Preoperative lab testing reviewed.  No further imaging required at this time.  Orders:  •  Ambulatory referral to Family Practice    SVT (supraventricular tachycardia) (HCC)  Patient's heart regular rate and rhythm on examination today.  Reviewed patient's previous EKG.  Patient denies any new or ongoing symptoms such as chest pain, palpitations, shortness of breath, lower extremity edema.  Patient has no known history of CAD or CHF.  SVT stable at this  time.            HEALTH MAINTENANCE     Immunization History   Administered Date(s) Administered   • COVID-19 PFIZER VACCINE 0.3 ML IM 03/07/2021, 03/27/2021, 11/29/2021   • COVID-19 Pfizer Vac BIVALENT Gio-sucrose 12 Yr+ IM 01/11/2023   • INFLUENZA 11/11/2020, 11/15/2021, 10/11/2022   • Tdap 08/22/2020   • Zoster Vaccine Recombinant 01/14/2021, 12/08/2021         CHIEF COMPLAINT     Chief Complaint   Patient presents with   • Pre-op Exam      HISTORY OF PRESENT ILLNESS     Ms. Delia Morin is a 64-year-old female with past medical history of SVT, hypertension, hepatic steatosis, GERD, hypothyroidism depression hyperlipidemia, primary osteoarthritis of both knees status post left total knee arthroplasty.  Patient presents to clinic today for preoperative examination for right total knee arthroplasty scheduled on 11/0/2024.    Patient was seen in the orthopedics office most recently on 09/26/2024.  At this time she is several months status post left total knee arthroplasty that she had done in February 2024 and doing well.  She is elected to pursue right total knee arthroplasty for quality of life improvements.  She has symptoms such as pain in her right knee with weightbearing activities.  And she states that her right knee symptoms limit her day-to-day activities and impedes on her quality of life.  She had previously undergone corticosteroid injections with minimal lasting relief.    Patient completed preoperative blood work on 10/11/2024. Complete metabolic panel shows normal electrolytes, normal LFTs, and kidney function stable.  CBC is without anemia some chronic macrocytosis is noted.  An anemia panel is in process.  PT/INR and PTT normal.  Hemoglobin A1c is in process though previous A1c normal and patient without history of diabetes.    REVIEW OF SYSTEMS     Review of Systems   Constitutional:  Negative for chills and fever.   HENT:  Negative for congestion, rhinorrhea and sore throat.    Respiratory:   "Negative for cough, shortness of breath and wheezing.    Cardiovascular:  Negative for chest pain and leg swelling.   Gastrointestinal:  Negative for abdominal distention, abdominal pain, constipation, diarrhea, nausea and vomiting.   Genitourinary:  Negative for difficulty urinating and dysuria.   Musculoskeletal:  Negative for arthralgias and back pain.   Skin:  Negative for rash and wound.   Neurological:  Negative for dizziness, syncope, weakness, light-headedness and headaches.   Psychiatric/Behavioral:  Negative for agitation, behavioral problems and confusion.      OBJECTIVE     Vitals:    10/11/24 1523   BP: 126/76   Pulse: 98   Resp: 16   SpO2: 95%   Weight: 90.3 kg (199 lb)   Height: 5' 6\" (1.676 m)     Physical Exam  Constitutional:       Appearance: Normal appearance.   HENT:      Right Ear: External ear normal.      Left Ear: External ear normal.   Cardiovascular:      Rate and Rhythm: Normal rate and regular rhythm.      Pulses: Normal pulses.      Heart sounds: Normal heart sounds. No murmur heard.  Pulmonary:      Effort: Pulmonary effort is normal. No respiratory distress.      Breath sounds: Normal breath sounds. No wheezing, rhonchi or rales.   Abdominal:      General: Abdomen is flat. Bowel sounds are normal. There is no distension.      Palpations: Abdomen is soft.      Tenderness: There is no abdominal tenderness.   Musculoskeletal:         General: Tenderness (R knee) present.      Right lower leg: No edema.      Left lower leg: No edema.   Skin:     General: Skin is warm and dry.   Neurological:      Mental Status: She is alert and oriented to person, place, and time.   Psychiatric:         Mood and Affect: Mood normal.         Behavior: Behavior normal.         Thought Content: Thought content normal.       CURRENT MEDICATIONS     Current Outpatient Medications:   •  acetaminophen (TYLENOL) 650 mg CR tablet, Take 1 tablet (650 mg total) by mouth every 8 (eight) hours as needed for mild pain, " Disp: 30 tablet, Rfl: 0  •  ascorbic acid (VITAMIN C) 500 mg tablet, Take 1 tablet (500 mg total) by mouth 2 (two) times a day for 60 doses, Disp: 60 tablet, Rfl: 0  •  ascorbic acid (VITAMIN C) 500 MG tablet, Take 1 tablet (500 mg total) by mouth 2 (two) times a day, Disp: 60 tablet, Rfl: 2  •  ascorbic acid (VITAMIN C) 500 MG tablet, Take 1 tablet (500 mg total) by mouth 2 (two) times a day, Disp: 60 tablet, Rfl: 0  •  Calcium Carb-Cholecalciferol 600-400 MG-UNIT TABS, Take by mouth, Disp: , Rfl:   •  cephalexin (KEFLEX) 500 mg capsule, 4 tablets 1 hour before dental visit repeat is necessary, Disp: 40 capsule, Rfl: 0  •  cyanocobalamin (VITAMIN B-12) 1000 MCG tablet, Take 1,000 mcg by mouth in the morning, Disp: , Rfl:   •  enoxaparin (LOVENOX) 40 mg/0.4 mL, Inject 0.4 mL (40 mg total) under the skin daily for 28 days To start postoperatively, Disp: 11.2 mL, Rfl: 0  •  ezetimibe (ZETIA) 10 mg tablet, Take 1 tablet (10 mg total) by mouth in the morning, Disp: 90 tablet, Rfl: 3  •  ferrous sulfate 324 (65 Fe) mg, Take 1 tablet (324 mg total) by mouth daily before breakfast (Patient not taking: Reported on 6/5/2024), Disp: 30 tablet, Rfl: 2  •  ferrous sulfate 324 (65 Fe) mg, Take one tablet daily., Disp: 30 tablet, Rfl: 0  •  ferrous sulfate 325 (65 Fe) mg tablet, Take 325 mg by mouth daily with breakfast (Patient not taking: Reported on 6/5/2024), Disp: , Rfl:   •  folic acid (FOLVITE) 1 mg tablet, Take 1 mg by mouth in the morning (Patient not taking: Reported on 6/5/2024), Disp: , Rfl:   •  folic acid (FOLVITE) 1 mg tablet, Take 1 tablet (1 mg total) by mouth daily (Patient not taking: Reported on 6/5/2024), Disp: 30 tablet, Rfl: 2  •  folic acid (FOLVITE) 1 mg tablet, Take 1 tablet (1 mg total) by mouth daily, Disp: 30 tablet, Rfl: 0  •  gabapentin (Neurontin) 100 mg capsule, Take 1 capsule (100 mg total) by mouth 3 (three) times a day, Disp: 90 capsule, Rfl: 0  •  hydroCHLOROthiazide 25 mg tablet, TAKE 1  TABLET (25 MG TOTAL) BY MOUTH DAILY., Disp: 90 tablet, Rfl: 3  •  levothyroxine 75 mcg tablet, TAKE 1 TABLET BY MOUTH EVERY DAY, Disp: 90 tablet, Rfl: 1  •  metoprolol succinate (TOPROL-XL) 50 mg 24 hr tablet, Take 1 tablet (50 mg total) by mouth daily, Disp: 30 tablet, Rfl: 0  •  metoprolol succinate (TOPROL-XL) 50 mg 24 hr tablet, Take 1 tablet (50 mg total) by mouth daily, Disp: 90 tablet, Rfl: 3  •  Multiple Vitamins-Minerals (multivitamin with minerals) tablet, Take 1 tablet by mouth daily, Disp: 30 tablet, Rfl: 2  •  omeprazole (PriLOSEC) 40 MG capsule, Take 1 capsule (40 mg total) by mouth 2 (two) times a day for 30 days, THEN 1 capsule (40 mg total) daily., Disp: 90 capsule, Rfl: 3  •  PARoxetine (PAXIL) 10 mg tablet, Take 1 tablet (10 mg total) by mouth daily, Disp: 90 tablet, Rfl: 1  •  triamcinolone (KENALOG) 0.1 % cream, , Disp: , Rfl:   •  valsartan (DIOVAN) 160 mg tablet, TAKE 1 TABLET BY MOUTH EVERY DAY, Disp: 90 tablet, Rfl: 1    PAST MEDICAL & SURGICAL HISTORY     Past Medical History:   Diagnosis Date   • Disease of thyroid gland    • GERD (gastroesophageal reflux disease)    • History of hepatitis B    • Hyperlipidemia    • Hypertension      Past Surgical History:   Procedure Laterality Date   • ABCESS DRAINAGE      tonsil at age 3   • COLONOSCOPY     • RI ARTHRP KNE CONDYLE&PLATU MEDIAL&LAT COMPARTMENTS Left 2/28/2024    Procedure: ARTHROPLASTY KNEE TOTAL W ROBOT;  Surgeon: Ignacio Leblanc MD;  Location: [facility];  Service: Orthopedics     SOCIAL & FAMILY HISTORY     Social History     Socioeconomic History   • Marital status: /Civil Union     Spouse name: Not on file   • Number of children: Not on file   • Years of education: Not on file   • Highest education level: Not on file   Occupational History   • Not on file   Tobacco Use   • Smoking status: Never   • Smokeless tobacco: Never   Vaping Use   • Vaping status: Never Used   Substance and Sexual Activity   • Alcohol use: Yes      Alcohol/week: 7.0 standard drinks of alcohol     Types: 7 Glasses of wine per week     Comment: social   • Drug use: Never   • Sexual activity: Yes     Partners: Male   Other Topics Concern   • Not on file   Social History Narrative   • Not on file     Social Determinants of Health     Financial Resource Strain: Low Risk  (11/30/2023)    [facility], [facility]    Overall Financial Resource Strain (CARDIA)    • Difficulty of Paying Living Expenses: Not hard at all   Food Insecurity: No Food Insecurity (11/30/2023)    [facility], [facility]    Hunger Vital Sign    • Worried About Running Out of Food in the Last Year: Never true    • Ran Out of Food in the Last Year: Never true   Transportation Needs: No Transportation Needs (11/30/2023)    [facility], [facility]    PRAPARE - Transportation    • Lack of Transportation (Medical): No    • Lack of Transportation (Non-Medical): No   Physical Activity: Sufficiently Active (11/30/2023)    [facility], [facility]    Exercise Vital Sign    • Days of Exercise per Week: 3 days    • Minutes of Exercise per Session: 60 min   Stress: No Stress Concern Present (11/30/2023)    [facility], [facility] and Its  Subsidiaries and Affiliates    [employer] [place] [employer] - Occupational Stress Questionnaire    • Feeling of Stress : Not at all   Social Connections: Socially Integrated (11/30/2023)    [facility], [facility] Its [facility] and Affiliates    Social Connection and Isolation Panel [NHANES]    • Frequency of Communication with Friends and Family: More than three times a week    • Frequency of Social Gatherings with Friends and Family: More than three times a week    • Attends [employer] Services: More than 4 times per year    • Active Member of Clubs or Organizations: Yes    • Attends Club or Organization Meetings: More than 4 times per year    • Marital Status:    Intimate Partner Violence: Low Risk  (8/7/2023)    [facility], [facility]    SDOH Do You Feel Safe At Home    • Do you feel safe at home?: Yes   Housing Stability: Not on file     Social History     Substance and Sexual Activity   Alcohol Use Yes   • Alcohol/week: 7.0 standard drinks of alcohol   • Types: 7 Glasses of wine per week    Comment: social       Social History     Substance and Sexual Activity   Drug Use Never     Social History     Tobacco Use   Smoking Status Never   Smokeless Tobacco Never     History reviewed. No pertinent family history.  ==  Dennis Beckwith DO  [facility] Internal Medicine  [address] [place] [address] [address] [place] [id number]  Office: [phone]  Fax: [phone]

## 2024-10-11 NOTE — ASSESSMENT & PLAN NOTE
Patient's heart regular rate and rhythm on examination today.  Reviewed patient's previous EKG.  Patient denies any new or ongoing symptoms such as chest pain, palpitations, shortness of breath, lower extremity edema.  Patient has no known history of CAD or CHF.  SVT stable at this time.

## 2024-10-12 ENCOUNTER — LAB REQUISITION (OUTPATIENT)
Dept: LAB | Facility: HOSPITAL | Age: 64
End: 2024-10-12
Payer: MEDICARE

## 2024-10-12 DIAGNOSIS — Z01.82 ENCOUNTER FOR ALLERGY TESTING: ICD-10-CM

## 2024-10-12 DIAGNOSIS — M17.11 UNILATERAL PRIMARY OSTEOARTHRITIS, RIGHT KNEE: ICD-10-CM

## 2024-10-12 LAB
ABO GROUP BLD: NORMAL
BLD GP AB SCN SERPL QL: NEGATIVE
RH BLD: POSITIVE
SPECIMEN EXPIRATION DATE: NORMAL

## 2024-10-14 ENCOUNTER — ANESTHESIA EVENT (OUTPATIENT)
Age: 64
End: 2024-10-14
Payer: MEDICARE

## 2024-10-18 NOTE — PRE-PROCEDURE INSTRUCTIONS
Pre-Surgery Instructions:   Medication Instructions    acetaminophen (TYLENOL) 650 mg CR tablet Uses PRN- OK to take day of surgery    ascorbic acid (VITAMIN C) 500 MG tablet Hold day of surgery.    Calcium Carb-Cholecalciferol 600-400 MG-UNIT TABS Hold day of surgery.    cyanocobalamin (VITAMIN B-12) 1000 MCG tablet Hold day of surgery.    ezetimibe (ZETIA) 10 mg tablet Hold day of surgery.    ferrous sulfate 324 (65 Fe) mg Hold day of surgery.    folic acid (FOLVITE) 1 mg tablet Hold day of surgery.    hydroCHLOROthiazide 25 mg tablet Hold day of surgery.    levothyroxine 75 mcg tablet Take day of surgery.    metoprolol succinate (TOPROL-XL) 50 mg 24 hr tablet Take day of surgery.    Multiple Vitamins-Minerals (multivitamin with minerals) tablet Hold day of surgery.    omeprazole (PriLOSEC) 40 MG capsule Take day of surgery.    PARoxetine (PAXIL) 10 mg tablet Take day of surgery.    triamcinolone (KENALOG) 0.1 % cream Hold day of surgery.    valsartan (DIOVAN) 160 mg tablet Hold day of surgery.    Medication instructions for day surgery reviewed. Please use only a sip of water to take your instructed medications. Avoid all over the counter vitamins, supplements and NSAIDS for one week prior to surgery per anesthesia guidelines. Tylenol is ok to take as needed.     You will receive a call one business day prior to surgery with an arrival time and hospital directions. If your surgery is scheduled on a Monday, the hospital will be calling you on the Friday prior to your surgery. If you have not heard from anyone by 8pm, please call the hospital supervisor through the hospital  at [phone]. ([place] [phone] or [place] [phone]).    Do not eat or drink anything after midnight the night before your surgery, including candy, mints, lifesavers, or chewing gum. Do not drink alcohol 24hrs before your surgery. Try not to smoke at least 24hrs before your surgery.       Follow the pre surgery showering  instructions as listed in the “My Surgical Experience Booklet” or otherwise provided by your surgeon's office. Do not use a blade to shave the surgical area 1 week before surgery. It is okay to use a clean electric clippers up to 24 hours before surgery. Do not apply any lotions, creams, including makeup, cologne, deodorant, or perfumes after showering on the day of your surgery. Do not use dry shampoo, hair spray, hair gel, or any type of hair products.     No contact lenses, eye make-up, or artificial eyelashes. Remove nail polish, including gel polish, and any artificial, gel, or acrylic nails if possible. Remove all jewelry including rings and body piercing jewelry.     Wear causal clothing that is easy to take on and off. Consider your type of surgery.    Keep any valuables, jewelry, piercings at home. Please bring any specially ordered equipment (sling, braces) if indicated.    Arrange for a responsible person to drive you to and from the hospital on the day of your surgery. Please confirm the visitor policy for the day of your procedure when you receive your phone call with an arrival time.     Call the surgeon's office with any new illnesses, exposures, or additional questions prior to surgery.    Please reference your “My Surgical Experience Booklet” for additional information to prepare for your upcoming surgery.

## 2024-10-30 DIAGNOSIS — Z47.1 AFTERCARE FOLLOWING RIGHT KNEE JOINT REPLACEMENT SURGERY: Primary | ICD-10-CM

## 2024-10-30 DIAGNOSIS — Z96.651 AFTERCARE FOLLOWING RIGHT KNEE JOINT REPLACEMENT SURGERY: Primary | ICD-10-CM

## 2024-11-01 NOTE — DISCHARGE INSTR - AVS FIRST PAGE
Discharge Instructions - Orthopedics  Delia Morin 64 y.o. female MRN: [id number]  Unit/Bed#:     Weight Bearing Status:                                           Weight Bearing as tolerated to the right lower extremity with assistive devices.     Pain Management/Medications  You may resume your usual medications.  You may stop pre-operative vitamins (Folic acid, Ferrous sulfate, and Vitamin C).   Please take the following medications:  Anti-coagulation (blood clot prevention) - Complete Lovenox injections for 28 days.   Pain medication:  Oxycodone 5 m tablet every 6 hours as needed for severe pain  Robaxin (Muscle relaxer) 500 m tablet up to 3 times a day as needed for mild pain and muscle discomfort  Tylenol 1000 mg: up to three times daily as needed for mild to moderate pain. Do not exceed 3000mg daily   Continue applying ice to your knee on and off for about 20 minutes as needed.  Continue to elevate your operative leg, with ankle above the level of your heart when possible.    If you have questions or pain concerns, please contact the office.   If you need refills of your medications prior to your next office visit, please contact the office.     Showering/Dressing Instructions:   Do not shower until first follow up appointment.  Keep surgical dressing clean and dry until follow up appointment.  You may adjust the ACE bandage as it slides down   No baths, swimming, or submerging your leg until cleared to do so.      Driving Instructions:  No driving until cleared by Orthopaedic Surgery.    PT/OT:  Continue PT/OT on outpatient basis as directed    Follow up instructions:   Follow up as scheduled on 2024 in [place]  If you need to change or cancel your appointment for any reason, please call the clinic at [phone]    Please contact the office if you experience any of the following:  Excessive bleeding (bleeding through your dressing)  Fever greater than 101 degrees F after 48 hours (low  grade fevers the day or two after surgery are normal)  Persistent nausea or vomiting  Decreased sensation or discoloration of the operative limb  Pain or swelling that is getting worse and not better with medication    Miscellaneous:  Advise against any dental cleanings or procedures for 3 months after surgery.   - If there is a dental emergency, please contact the office for further instructions.

## 2024-11-04 ENCOUNTER — ANESTHESIA (OUTPATIENT)
Age: 64
End: 2024-11-04
Payer: MEDICARE

## 2024-11-04 ENCOUNTER — HOSPITAL ENCOUNTER (OUTPATIENT)
Age: 64
Setting detail: OUTPATIENT SURGERY
Discharge: HOME | End: 2024-11-05
Attending: ORTHOPAEDIC SURGERY | Admitting: ORTHOPAEDIC SURGERY
Payer: MEDICARE

## 2024-11-04 DIAGNOSIS — M17.11 PRIMARY OSTEOARTHRITIS OF RIGHT KNEE: Primary | ICD-10-CM

## 2024-11-04 DIAGNOSIS — M17.11 PRIMARY OSTEOARTHRITIS OF RIGHT KNEE: ICD-10-CM

## 2024-11-04 PROCEDURE — C1776 JOINT DEVICE (IMPLANTABLE): HCPCS | Performed by: ORTHOPAEDIC SURGERY

## 2024-11-04 PROCEDURE — S2900 ROBOTIC SURGICAL SYSTEM: HCPCS | Performed by: ORTHOPAEDIC SURGERY

## 2024-11-04 PROCEDURE — NC001 PR NO CHARGE: Performed by: ORTHOPAEDIC SURGERY

## 2024-11-04 PROCEDURE — C9290 INJ, BUPIVACAINE LIPOSOME: HCPCS | Performed by: ANESTHESIOLOGY

## 2024-11-04 PROCEDURE — 27447 TOTAL KNEE ARTHROPLASTY: CPT | Performed by: ORTHOPAEDIC SURGERY

## 2024-11-04 PROCEDURE — C1713 ANCHOR/SCREW BN/BN,TIS/BN: HCPCS | Performed by: ORTHOPAEDIC SURGERY

## 2024-11-04 PROCEDURE — 99024 POSTOP FOLLOW-UP VISIT: CPT | Performed by: PHYSICIAN ASSISTANT

## 2024-11-04 DEVICE — ATTUNE PATELLA MEDIALIZED DOME 35MM CEMENTED AOX
Type: IMPLANTABLE DEVICE | Site: KNEE | Status: FUNCTIONAL
Brand: ATTUNE

## 2024-11-04 DEVICE — ATTUNE KNEE SYSTEM TIBIAL INSERT FIXED BEARING POSTERIOR STABILIZED 5 7MM AOX
Type: IMPLANTABLE DEVICE | Site: KNEE | Status: FUNCTIONAL
Brand: ATTUNE

## 2024-11-04 DEVICE — ATTUNE KNEE SYSTEM FEMORAL POSTERIOR STABILIZED NARROW SIZE 5N RIGHT CEMENTED
Type: IMPLANTABLE DEVICE | Site: KNEE | Status: FUNCTIONAL
Brand: ATTUNE

## 2024-11-04 DEVICE — ATTUNE KNEE SYSTEM TIBIAL BASE FIXED BEARING SIZE 4 CEMENTED
Type: IMPLANTABLE DEVICE | Site: KNEE | Status: FUNCTIONAL
Brand: ATTUNE

## 2024-11-04 DEVICE — SMARTSET HV HIGH VISCOSITY BONE CEMENT 40G
Type: IMPLANTABLE DEVICE | Site: KNEE | Status: FUNCTIONAL
Brand: SMARTSET

## 2024-11-04 RX ORDER — PHENYLEPHRINE HCL IN 0.9% NACL 1 MG/10 ML
SYRINGE (ML) INTRAVENOUS AS NEEDED
Status: DISCONTINUED | OUTPATIENT
Start: 2024-11-04 | End: 2024-11-04

## 2024-11-04 RX ORDER — OXYCODONE HYDROCHLORIDE 10 MG/1
10 TABLET ORAL EVERY 4 HOURS PRN
Status: DISCONTINUED | OUTPATIENT
Start: 2024-11-04 | End: 2024-11-05 | Stop reason: HOSPADM

## 2024-11-04 RX ORDER — ACETAMINOPHEN 500 MG
1000 TABLET ORAL EVERY 6 HOURS PRN
Qty: 90 TABLET | Refills: 0 | Status: SHIPPED | OUTPATIENT
Start: 2024-11-04

## 2024-11-04 RX ORDER — GABAPENTIN 100 MG/1
100 CAPSULE ORAL EVERY 8 HOURS
Status: DISCONTINUED | OUTPATIENT
Start: 2024-11-04 | End: 2024-11-05 | Stop reason: HOSPADM

## 2024-11-04 RX ORDER — ONDANSETRON 2 MG/ML
4 INJECTION INTRAMUSCULAR; INTRAVENOUS ONCE AS NEEDED
Status: DISCONTINUED | OUTPATIENT
Start: 2024-11-04 | End: 2024-11-04 | Stop reason: HOSPADM

## 2024-11-04 RX ORDER — DEXAMETHASONE SODIUM PHOSPHATE 10 MG/ML
INJECTION, SOLUTION INTRAMUSCULAR; INTRAVENOUS AS NEEDED
Status: DISCONTINUED | OUTPATIENT
Start: 2024-11-04 | End: 2024-11-04

## 2024-11-04 RX ORDER — MIDAZOLAM HYDROCHLORIDE 2 MG/2ML
2 INJECTION, SOLUTION INTRAMUSCULAR; INTRAVENOUS ONCE
Status: COMPLETED | OUTPATIENT
Start: 2024-11-04 | End: 2024-11-04

## 2024-11-04 RX ORDER — PROMETHAZINE HYDROCHLORIDE 25 MG/ML
12.5 INJECTION, SOLUTION INTRAMUSCULAR; INTRAVENOUS ONCE AS NEEDED
Status: DISCONTINUED | OUTPATIENT
Start: 2024-11-04 | End: 2024-11-04 | Stop reason: HOSPADM

## 2024-11-04 RX ORDER — OXYCODONE HYDROCHLORIDE 5 MG/1
5 TABLET ORAL EVERY 6 HOURS PRN
Qty: 30 TABLET | Refills: 0 | Status: SHIPPED | OUTPATIENT
Start: 2024-11-04 | End: 2024-11-12 | Stop reason: SDUPTHER

## 2024-11-04 RX ORDER — TRANEXAMIC ACID 10 MG/ML
1000 INJECTION, SOLUTION INTRAVENOUS ONCE
Status: COMPLETED | OUTPATIENT
Start: 2024-11-04 | End: 2024-11-04

## 2024-11-04 RX ORDER — BUPIVACAINE HYDROCHLORIDE 2.5 MG/ML
INJECTION, SOLUTION EPIDURAL; INFILTRATION; INTRACAUDAL
Status: COMPLETED | OUTPATIENT
Start: 2024-11-04 | End: 2024-11-04

## 2024-11-04 RX ORDER — CEFAZOLIN SODIUM 2 G/50ML
2000 SOLUTION INTRAVENOUS ONCE
Status: COMPLETED | OUTPATIENT
Start: 2024-11-04 | End: 2024-11-04

## 2024-11-04 RX ORDER — SODIUM CHLORIDE, SODIUM LACTATE, POTASSIUM CHLORIDE, CALCIUM CHLORIDE 600; 310; 30; 20 MG/100ML; MG/100ML; MG/100ML; MG/100ML
125 INJECTION, SOLUTION INTRAVENOUS CONTINUOUS
Status: DISCONTINUED | OUTPATIENT
Start: 2024-11-04 | End: 2024-11-05 | Stop reason: HOSPADM

## 2024-11-04 RX ORDER — METHOCARBAMOL 500 MG/1
500 TABLET, FILM COATED ORAL EVERY 6 HOURS SCHEDULED
Status: DISCONTINUED | OUTPATIENT
Start: 2024-11-04 | End: 2024-11-05 | Stop reason: HOSPADM

## 2024-11-04 RX ORDER — PROPOFOL 10 MG/ML
INJECTION, EMULSION INTRAVENOUS CONTINUOUS PRN
Status: DISCONTINUED | OUTPATIENT
Start: 2024-11-04 | End: 2024-11-04

## 2024-11-04 RX ORDER — HYDRALAZINE HYDROCHLORIDE 20 MG/ML
5 INJECTION INTRAMUSCULAR; INTRAVENOUS
Status: DISCONTINUED | OUTPATIENT
Start: 2024-11-04 | End: 2024-11-04 | Stop reason: HOSPADM

## 2024-11-04 RX ORDER — ONDANSETRON 2 MG/ML
4 INJECTION INTRAMUSCULAR; INTRAVENOUS EVERY 6 HOURS PRN
Status: DISCONTINUED | OUTPATIENT
Start: 2024-11-04 | End: 2024-11-05 | Stop reason: HOSPADM

## 2024-11-04 RX ORDER — ACETAMINOPHEN 325 MG/1
975 TABLET ORAL EVERY 6 HOURS PRN
Status: DISCONTINUED | OUTPATIENT
Start: 2024-11-04 | End: 2024-11-05 | Stop reason: HOSPADM

## 2024-11-04 RX ORDER — HYDROMORPHONE HCL/PF 1 MG/ML
0.5 SYRINGE (ML) INJECTION
Status: DISCONTINUED | OUTPATIENT
Start: 2024-11-04 | End: 2024-11-04 | Stop reason: HOSPADM

## 2024-11-04 RX ORDER — CALCIUM CARBONATE 500 MG/1
1000 TABLET, CHEWABLE ORAL DAILY PRN
Status: DISCONTINUED | OUTPATIENT
Start: 2024-11-04 | End: 2024-11-05 | Stop reason: HOSPADM

## 2024-11-04 RX ORDER — OXYCODONE HYDROCHLORIDE 5 MG/1
5 TABLET ORAL EVERY 4 HOURS PRN
Status: DISCONTINUED | OUTPATIENT
Start: 2024-11-04 | End: 2024-11-05 | Stop reason: HOSPADM

## 2024-11-04 RX ORDER — LABETALOL HYDROCHLORIDE 5 MG/ML
10 INJECTION, SOLUTION INTRAVENOUS
Status: DISCONTINUED | OUTPATIENT
Start: 2024-11-04 | End: 2024-11-04 | Stop reason: HOSPADM

## 2024-11-04 RX ORDER — MAGNESIUM HYDROXIDE 1200 MG/15ML
LIQUID ORAL AS NEEDED
Status: DISCONTINUED | OUTPATIENT
Start: 2024-11-04 | End: 2024-11-04 | Stop reason: HOSPADM

## 2024-11-04 RX ORDER — HYDROMORPHONE HCL IN WATER/PF 6 MG/30 ML
0.2 PATIENT CONTROLLED ANALGESIA SYRINGE INTRAVENOUS
Status: DISCONTINUED | OUTPATIENT
Start: 2024-11-04 | End: 2024-11-04 | Stop reason: HOSPADM

## 2024-11-04 RX ORDER — METOCLOPRAMIDE HYDROCHLORIDE 5 MG/ML
10 INJECTION INTRAMUSCULAR; INTRAVENOUS ONCE AS NEEDED
Status: DISCONTINUED | OUTPATIENT
Start: 2024-11-04 | End: 2024-11-04 | Stop reason: HOSPADM

## 2024-11-04 RX ORDER — CHLORHEXIDINE GLUCONATE ORAL RINSE 1.2 MG/ML
15 SOLUTION DENTAL ONCE
Status: COMPLETED | OUTPATIENT
Start: 2024-11-04 | End: 2024-11-04

## 2024-11-04 RX ORDER — GABAPENTIN 300 MG/1
300 CAPSULE ORAL ONCE
Status: COMPLETED | OUTPATIENT
Start: 2024-11-04 | End: 2024-11-04

## 2024-11-04 RX ORDER — CEFAZOLIN SODIUM 1 G/50ML
1000 SOLUTION INTRAVENOUS EVERY 8 HOURS
Status: COMPLETED | OUTPATIENT
Start: 2024-11-04 | End: 2024-11-05

## 2024-11-04 RX ORDER — DOCUSATE SODIUM 100 MG/1
100 CAPSULE, LIQUID FILLED ORAL 2 TIMES DAILY
Status: DISCONTINUED | OUTPATIENT
Start: 2024-11-04 | End: 2024-11-05 | Stop reason: HOSPADM

## 2024-11-04 RX ORDER — GABAPENTIN 100 MG/1
100 CAPSULE ORAL 3 TIMES DAILY
Qty: 90 CAPSULE | Refills: 0 | Status: SHIPPED | OUTPATIENT
Start: 2024-11-04

## 2024-11-04 RX ORDER — METHOCARBAMOL 500 MG/1
500 TABLET, FILM COATED ORAL 3 TIMES DAILY PRN
Qty: 60 TABLET | Refills: 0 | Status: SHIPPED | OUTPATIENT
Start: 2024-11-04 | End: 2024-11-05

## 2024-11-04 RX ORDER — ONDANSETRON 2 MG/ML
INJECTION INTRAMUSCULAR; INTRAVENOUS AS NEEDED
Status: DISCONTINUED | OUTPATIENT
Start: 2024-11-04 | End: 2024-11-04

## 2024-11-04 RX ORDER — HYDROMORPHONE HCL/PF 1 MG/ML
0.5 SYRINGE (ML) INJECTION EVERY 2 HOUR PRN
Status: DISCONTINUED | OUTPATIENT
Start: 2024-11-04 | End: 2024-11-05 | Stop reason: HOSPADM

## 2024-11-04 RX ORDER — ACETAMINOPHEN 325 MG/1
975 TABLET ORAL ONCE
Status: COMPLETED | OUTPATIENT
Start: 2024-11-04 | End: 2024-11-04

## 2024-11-04 RX ORDER — FENTANYL CITRATE/PF 50 MCG/ML
25 SYRINGE (ML) INJECTION
Status: DISCONTINUED | OUTPATIENT
Start: 2024-11-04 | End: 2024-11-04 | Stop reason: HOSPADM

## 2024-11-04 RX ORDER — ALBUTEROL SULFATE 0.83 MG/ML
2.5 SOLUTION RESPIRATORY (INHALATION) ONCE AS NEEDED
Status: DISCONTINUED | OUTPATIENT
Start: 2024-11-04 | End: 2024-11-04 | Stop reason: HOSPADM

## 2024-11-04 RX ORDER — BUPIVACAINE HYDROCHLORIDE 5 MG/ML
INJECTION, SOLUTION EPIDURAL; INTRACAUDAL
Status: COMPLETED | OUTPATIENT
Start: 2024-11-04 | End: 2024-11-04

## 2024-11-04 RX ORDER — ENOXAPARIN SODIUM 100 MG/ML
40 INJECTION SUBCUTANEOUS DAILY
Status: DISCONTINUED | OUTPATIENT
Start: 2024-11-05 | End: 2024-11-05 | Stop reason: HOSPADM

## 2024-11-04 RX ADMIN — SODIUM CHLORIDE, SODIUM LACTATE, POTASSIUM CHLORIDE, AND CALCIUM CHLORIDE 125 ML/HR: .6; .31; .03; .02 INJECTION, SOLUTION INTRAVENOUS at 17:51

## 2024-11-04 RX ADMIN — DEXAMETHASONE SODIUM PHOSPHATE 10 MG: 10 INJECTION INTRAMUSCULAR; INTRAVENOUS at 15:19

## 2024-11-04 RX ADMIN — TRANEXAMIC ACID 1000 MG: 10 INJECTION, SOLUTION INTRAVENOUS at 15:19

## 2024-11-04 RX ADMIN — CEFAZOLIN SODIUM 1000 MG: 1 SOLUTION INTRAVENOUS at 23:33

## 2024-11-04 RX ADMIN — FENTANYL CITRATE 25 MCG: 50 INJECTION INTRAMUSCULAR; INTRAVENOUS at 16:57

## 2024-11-04 RX ADMIN — FENTANYL CITRATE 25 MCG: 50 INJECTION INTRAMUSCULAR; INTRAVENOUS at 17:32

## 2024-11-04 RX ADMIN — OXYCODONE 5 MG: 5 TABLET ORAL at 23:36

## 2024-11-04 RX ADMIN — OXYCODONE 5 MG: 5 TABLET ORAL at 18:40

## 2024-11-04 RX ADMIN — Medication 100 MCG: at 15:25

## 2024-11-04 RX ADMIN — PROPOFOL 100 MCG/KG/MIN: 10 INJECTION, EMULSION INTRAVENOUS at 15:16

## 2024-11-04 RX ADMIN — ONDANSETRON 4 MG: 2 INJECTION INTRAMUSCULAR; INTRAVENOUS at 15:19

## 2024-11-04 RX ADMIN — SODIUM CHLORIDE, SODIUM LACTATE, POTASSIUM CHLORIDE, AND CALCIUM CHLORIDE 125 ML/HR: .6; .31; .03; .02 INJECTION, SOLUTION INTRAVENOUS at 12:38

## 2024-11-04 RX ADMIN — MEPIVACAINE HYDROCHLORIDE 3 ML: 15 INJECTION, SOLUTION EPIDURAL; INFILTRATION at 15:15

## 2024-11-04 RX ADMIN — CEFAZOLIN SODIUM 2000 MG: 2 SOLUTION INTRAVENOUS at 15:17

## 2024-11-04 RX ADMIN — METHOCARBAMOL TABLETS 500 MG: 500 TABLET, COATED ORAL at 23:33

## 2024-11-04 RX ADMIN — GABAPENTIN 100 MG: 100 CAPSULE ORAL at 20:30

## 2024-11-04 RX ADMIN — SODIUM CHLORIDE, SODIUM LACTATE, POTASSIUM CHLORIDE, AND CALCIUM CHLORIDE: .6; .31; .03; .02 INJECTION, SOLUTION INTRAVENOUS at 15:28

## 2024-11-04 RX ADMIN — PHENYLEPHRINE HYDROCHLORIDE 30 MCG/MIN: 10 INJECTION INTRAVENOUS at 15:16

## 2024-11-04 RX ADMIN — ACETAMINOPHEN 325MG 975 MG: 325 TABLET ORAL at 12:37

## 2024-11-04 RX ADMIN — BUPIVACAINE 20 ML: 13.3 INJECTION, SUSPENSION, LIPOSOMAL INFILTRATION at 14:30

## 2024-11-04 RX ADMIN — MIDAZOLAM 2 MG: 1 INJECTION INTRAMUSCULAR; INTRAVENOUS at 14:30

## 2024-11-04 RX ADMIN — CHLORHEXIDINE GLUCONATE 15 ML: 1.2 RINSE ORAL at 12:37

## 2024-11-04 RX ADMIN — METHOCARBAMOL TABLETS 500 MG: 500 TABLET, COATED ORAL at 20:30

## 2024-11-04 RX ADMIN — FENTANYL CITRATE 25 MCG: 50 INJECTION INTRAMUSCULAR; INTRAVENOUS at 17:09

## 2024-11-04 RX ADMIN — GABAPENTIN 300 MG: 300 CAPSULE ORAL at 12:38

## 2024-11-04 RX ADMIN — BUPIVACAINE HYDROCHLORIDE 20 ML: 2.5 INJECTION, SOLUTION EPIDURAL; INFILTRATION; INTRACAUDAL; PERINEURAL at 14:35

## 2024-11-04 RX ADMIN — DOCUSATE SODIUM 100 MG: 100 CAPSULE, LIQUID FILLED ORAL at 18:39

## 2024-11-04 RX ADMIN — BUPIVACAINE HYDROCHLORIDE 10 ML: 5 INJECTION, SOLUTION EPIDURAL; INTRACAUDAL; PERINEURAL at 14:30

## 2024-11-04 NOTE — ANESTHESIA PROCEDURE NOTES
Peripheral Block    Patient location during procedure: holding area  Start time: 11/4/2024 2:30 PM  Reason for block: at surgeon's request and post-op pain management  Staffing  Performed by: Lincoln Palencia MD  Authorized by: Lincoln Palencia MD    Preanesthetic Checklist  Completed: patient identified, IV checked, site marked, risks and benefits discussed, surgical consent, monitors and equipment checked, pre-op evaluation and timeout performed  Peripheral Block  Patient position: supine  Prep: ChloraPrep  Patient monitoring: frequent blood pressure checks, continuous pulse oximetry and heart rate  Block type: Adductor Canal  Laterality: right  Injection technique: single-shot  Procedures: ultrasound guided, Ultrasound guidance required for the procedure to increase accuracy and safety of medication placement and decrease risk of complications.  Ultrasound permanent image saved  bupivacaine (PF) (MARCAINE) 0.5 % injection 20 mL - Perineural   10 mL - 11/4/2024 2:30:00 PM  bupivacaine liposomal (EXPAREL) 1.3 % injection 20 mL - Perineural   20 mL - 11/4/2024 2:30:00 PM  Needle  Needle type: Stimuplex   Needle gauge: 20 G  Needle length: 4 in  Needle localization: anatomical landmarks and ultrasound guidance  Assessment  Injection assessment: incremental injection, frequent aspiration, injected with ease, negative aspiration, negative for heart rate change, no paresthesia on injection, no symptoms of intraneural/intravenous injection and needle tip visualized at all times  Paresthesia pain: none  Post-procedure:  site cleaned  patient tolerated the procedure well with no immediate complications

## 2024-11-04 NOTE — ANESTHESIA PROCEDURE NOTES
Peripheral Block    Patient location during procedure: holding area  Start time: 11/4/2024 2:35 PM  Reason for block: at surgeon's request and post-op pain management  Staffing  Performed by: Lincoln Palencia MD  Authorized by: Lincoln Palencia MD    Preanesthetic Checklist  Completed: patient identified, IV checked, site marked, risks and benefits discussed, surgical consent, monitors and equipment checked, pre-op evaluation and timeout performed  Peripheral Block  Patient position: supine  Prep: ChloraPrep  Patient monitoring: frequent blood pressure checks, continuous pulse oximetry and heart rate  Block type: IPACK  Laterality: right  Injection technique: single-shot  Procedures: ultrasound guided, Ultrasound guidance required for the procedure to increase accuracy and safety of medication placement and decrease risk of complications.  Ultrasound permanent image saved  bupivacaine (PF) (MARCAINE) 0.25 % injection 20 mL - Perineural   20 mL - 11/4/2024 2:35:00 PM  Needle  Needle type: Stimuplex   Needle gauge: 20 G  Needle length: 4 in  Needle localization: anatomical landmarks and ultrasound guidance  Assessment  Injection assessment: incremental injection, frequent aspiration, injected with ease, negative aspiration, negative for heart rate change, no paresthesia on injection, no symptoms of intraneural/intravenous injection and needle tip visualized at all times  Paresthesia pain: none  Post-procedure:  site cleaned  patient tolerated the procedure well with no immediate complications

## 2024-11-04 NOTE — ANESTHESIA PROCEDURE NOTES
Spinal Block    Patient location during procedure: OR  Start time: 11/4/2024 3:15 PM  Reason for block: procedure for pain and at surgeon's request  Staffing  Performed by: Nadeem uKo CRNA  Authorized by: Lincoln Palencia MD    Preanesthetic Checklist  Completed: patient identified, IV checked, site marked, risks and benefits discussed, surgical consent, monitors and equipment checked, pre-op evaluation and timeout performed  Spinal Block  Patient position: sitting  Prep: ChloraPrep and site prepped and draped  Patient monitoring: frequent blood pressure checks, continuous pulse ox and heart rate  Approach: midline  Location: L3-4  Needle  Needle type: Pencan   Needle gauge: 24 G  Needle length: 4 in  Assessment  Sensory level: T4  Injection Assessment:  negative aspiration for heme, no paresthesia on injection and positive aspiration for clear CSF.  Post-procedure:  site cleaned

## 2024-11-04 NOTE — ANESTHESIA POSTPROCEDURE EVALUATION
Post-Op Assessment Note    CV Status:  Stable    Pain management: adequate    Multimodal analgesia used between 6 hours prior to anesthesia start to PACU discharge    Mental Status:  Alert   Hydration Status:  Stable   PONV Controlled:  None   Airway Patency:  Patent     Post Op Vitals Reviewed: Yes    No anethesia notable event occurred.    Staff: Anesthesiologist           Last Filed PACU Vitals:  Vitals Value Taken Time   Temp 97.9 °F (36.6 °C) 11/04/24 1640   Pulse 66 11/04/24 1654   /68 11/04/24 1645   Resp 27 11/04/24 1654   SpO2 94 % 11/04/24 1654   Vitals shown include unfiled device data.    Modified Bj:  Activity: 2 (11/4/2024  4:40 PM)  Respiration: 2 (11/4/2024  4:40 PM)  Circulation: 2 (11/4/2024  4:40 PM)  Consciousness: 2 (11/4/2024  4:40 PM)  Oxygen Saturation: 2 (11/4/2024  4:40 PM)  Modified Bj Score: 10 (11/4/2024  4:40 PM)

## 2024-11-04 NOTE — ANESTHESIA PREPROCEDURE EVALUATION
Procedure:  ARTHROPLASTY KNEE TOTAL W ROBOT (Right: Knee)    PERF SPECT (09/21/23):  Interpretation Summary    Stress ECG: No ST deviation is noted. The ECG was negative for ischemia. The stress ECG is negative for ischemia after maximal exercise, without reproduction of symptoms.    Perfusion: There are no perfusion defects.    Stress Function: Left ventricular function post-stress is normal.    Stress Combined Conclusion: The ECG and SPECT imaging portions of the stress study are concordant with no evidence of stress induced myocardial ischemia. Left ventricular perfusion is normal.    Substance History  Current as of 11/04/24 1231  Smoking Status: Never   Smokeless Tobacco Status: Never   Alcohol use: Yes; 7.0 standard drinks of alcohol per week   Comments: social   Drug use: Never     Relevant Problems   ANESTHESIA (within normal limits)      CARDIO   (+) Mixed hyperlipidemia   (+) Primary hypertension   (+) Raynaud's syndrome without gangrene   (+) SVT (supraventricular tachycardia) (HCC)      ENDO   (+) Acquired hypothyroidism      GI/HEPATIC   (+) Gastroesophageal reflux disease      /RENAL (within normal limits)      GYN (within normal limits)      HEMATOLOGY   (+) Acute blood loss anemia      MUSCULOSKELETAL   (+) Primary osteoarthritis of both knees   (+) Primary osteoarthritis of left knee      NEURO/PSYCH   (+) Single major depressive episode, in full remission (HCC)      PULMONARY (within normal limits)      Lab Results   Component Value Date    WBC 5.70 10/11/2024    HGB 13.5 10/11/2024    HCT 40.3 10/11/2024     (H) 10/11/2024     10/11/2024     Lab Results   Component Value Date     10/29/2015    SODIUM 138 10/11/2024    K 4.6 10/11/2024    CL 99 10/11/2024    CO2 28 10/11/2024    ANIONGAP 7 10/29/2015    AGAP 11 10/11/2024    BUN 16 10/11/2024    CREATININE 0.97 10/11/2024    GLUC 105 02/29/2024    GLUF 94 10/11/2024    CALCIUM 9.5 10/11/2024    AST 27 10/11/2024    ALT 30  10/11/2024    ALKPHOS 83 10/11/2024    PROT 7.6 10/29/2015    TP 6.7 10/11/2024    BILITOT 0.57 10/29/2015    TBILI 0.59 10/11/2024    EGFR 61 10/11/2024     Lab Results   Component Value Date    PTT 24 10/11/2024     Lab Results   Component Value Date    INR 0.92 10/11/2024    INR 0.96 02/07/2024    PROTIME 12.7 10/11/2024    PROTIME 12.7 02/07/2024       Physical Exam    Airway    Mallampati score: II  TM Distance: >3 FB  Neck ROM: full     Dental       Cardiovascular  Rhythm: regular, Rate: normal    Pulmonary   Breath sounds clear to auscultation    Other Findings  post-pubertal.      Anesthesia Plan  ASA Score- 2     Anesthesia Type- spinal with ASA Monitors.         Additional Monitors:     Airway Plan:     Comment: Spinal with IV sedation and preoperative adductor canal block.       Plan Factors-Exercise tolerance (METS): >4 METS.    Chart reviewed. EKG reviewed.  Existing labs reviewed. Patient summary reviewed.    Patient is not a current smoker.  Patient did not smoke on day of surgery.    Obstructive sleep apnea risk education given perioperatively.        Induction- intravenous.    Postoperative Plan- Plan for postoperative opioid use.         Informed Consent- Anesthetic plan and risks discussed with patient.  I personally reviewed this patient with the CRNA. Discussed and agreed on the Anesthesia Plan with the CRNA..

## 2024-11-04 NOTE — OP NOTE
OPERATIVE REPORT  PATIENT NAME: Delia Morin  : 1960  MRN: [id number]  Pt Location:  [facility] OR ROOM 06    Surgery Date: 2024    Surgeons and Role:     * Ignacio Leblanc MD - Primary     * Og Diggs MD - Assisting     * Randa Panda PA-C - Assisting     Preop Diagnosis:  Primary osteoarthritis of right knee [M17.11]    Post-Op Diagnosis Codes:     * Primary osteoarthritis of right knee [M17.11]    Procedure(s):  Right - ARTHROPLASTY KNEE TOTAL W ROBOT    Specimens:  * No specimens in log *    Estimated Blood Loss:   Minimal    Drains:  Closed/Suction Drain Anterior;Right Knee Accordion 10 Fr. (Active)   Number of days: 0       Urethral Catheter Non-latex;Straight-tip 16 Fr. (Active)   Number of days: 0       Anesthesia Type:   Choice     Operative Indications:  Primary osteoarthritis of right knee [M17.11]    Operative Findings:  Depuy attune   Femur-5N  Poly-7   Tibia-4   Patella-35    Complications:   None    Knee Technique: Suture (direct) Repair  Knee Approach: Medial Parapatellar    Chronic Narcotic Use:  No      Procedure and Technique:  Following the induction of adequate level of spinal anesthesia, Rebolledo catheter was then sterilely introduced in this patient's bladder.  Antibiotics administered.  The right thigh was not fitted with a thigh-high tourniquet.  The right lower extremity then underwent sterile prep and drape.  The right lower extremity was exsanguinated to gravity, and the tourniquet is inflated to 300 mmHg.  A midline knee incision was greater than in flexion.  Full-thickness flaps were raised in order to access the extensor mechanism.  A medial arthrotomy was created to open up the knee joint.  2 half pins were placed from the proximal tibia, 2 half pins were placed within the distal femur.  In doing so, modules were created.  The arrays were then attached to the modules.  Checkpoints made the proximal tibia and distal femur.  The knee was then registered.  The surgery was  planned out on the computer, the plan was finalized.  The robot was docked.  The first maneuver involve the distal femoral cut followed by anterior posterior cuts.  The chamfer cuts completed the process.  The proximal tibia cut was made next.  Care was taken preserve the integrity medial collateral, lateral collateral, posterior structures during these maneuvers.  The box cut was then made for the posterior stabilized unit, and remnant medial and lateral meniscectomies then performed.  Trials were inserted, and the knee was taken through range of motion, and found to be capable full extension, good flexion, and stable throughout.  The patella was then resurfaced while utilizing manufactures equipment, is found to be a size 35 mm button.  The trial components were removed and the knee was prepared for insertion of cemented components.  The cemented tibia, cemented femur, trial poly-, cemented patella then placed.  Excess cement was removed, and the knee was brought to extension.  The cement was allowed to cure.  The trial poly was taken out, the knee was packed off.  The tourniquet was deflated, and hemostasis was secured.  The insert polyethylene was then snapped into position.  The knee was taken through a final range of motion, found to be capable full extension, good flexion, stable throughout, and excellent patella tracking.  Satisfied with the extent of surgery, the wound was then flushed with saline and closed.  A Betadine soak initiated.  A drain is placed deep brought via separate lateral stab incision.  The arthrotomy was closed with number Vicryl suture.  The subcu tissues were closed with 2-0 Vicryl suture.  The skin was then closed with staples.  Sterile dressings were applied.  She was then transferred to recovery room in stable condition with plans include physical therapy for weightbearing to tolerance.  She will require DVT prophylaxis with Lovenox   I was present for the entire  procedure.    Patient Disposition:  PACU             SIGNATURE: Ignacio Leblanc MD  DATE: November 4, 2024  TIME: 4:33 PM

## 2024-11-04 NOTE — H&P
H&P Exam - Orthopedics   Delia Morin 64 y.o. female MRN: [id number]      Assessment/Plan   Assessment:  right Knee Osteoarthritis in this adult female who continues to have both pain and dysfunction despite appropriate nonsurgical treatments    Plan:  right  Total Knee Arthroplasty.  Patient is familiar with risks, benefits, and alternatives    TOTAL KNEE REPLACEMENT INDICATIONS AND RISKS    We had a lengthy discussion with the patient regarding the potential options for treatment. The patient has had an extensive conservative management course up until this time and therefore I do not feel that any additional conservative management will provide additional relief. The patient's symptoms have progressively worsened to the point where they now limit the patient's daily activities and quality of life.     At this time, I feel that this patient would be an excellent candidate for a total knee replacement. The patient has failed non-operative care and continues to have unacceptable symptoms. We discussed the treatment options and alternatives and the risks and benefits of surgery in great detail.    While no guarantees can be made, total knee replacement has a very high success rate in terms of relieving a patient's knee pain and returning them to a more active, independent lifestyle for 10-15 years or more. All surgery carries some risk; for knee replacement, the complication rate is low but may include: death (very rare), infection, bleeding requiring transfusion, blood clots in legs traveling to lungs, nerve and/or blood vessel damage, bone fracture, persistent knee pain and/or stiffness, and repeat surgery(ies). The risk of a major complication is about 1-2 per 1000 cases. Total knee replacement should only be done if conservative treatment has failed. The revision rate for total knee replacements is about 1-2% per year; in other words, 85-95% of knee replacements may last 10 years; 75-85% last 20 years; and so  on, assuming no injury to the knee. Finally we discussed anesthesia related complications which will be discussed in greater detail with the anesthesia team before surgery.     The patient was shown total knee booklets, diagrams and/or models and all of their questions have been answered at the present time. The patient may call or come in if they have any other concerns or questions. The patient also understands the post-operative rehabilitation process and the need for their cooperation and participation, and that their results may be compromised by their lack of compliance. The patient would like to proceed. Patient is encouraged to seek additional opinions if they so desire. The patient voiced their understanding of the surgical plan and potential complications and wishes to proceed with surgery.      History of Present Illness   HPI:  Delia Morin is a 64 y.o. female who presents with pain in the right knee. Patient is no longer getting adequate relief from non-operative modalities. Patient is continuing to have debilitating pain from their knee, interfering with daily activities and sleep. Patient denies any concerns with infections, new neuropathies, or any acute injuries.     Historical Information  Review Of Systems:   Skin: Normal  Neuro: See HPI  Musculoskeletal: See HPI  14 point review of systems negative except as stated above     Past Medical History:   Past Medical History:   Diagnosis Date    Disease of thyroid gland     GERD (gastroesophageal reflux disease)     History of hepatitis B     Hyperlipidemia     Hypertension        Past Surgical History:   Past Surgical History:   Procedure Laterality Date    ABCESS DRAINAGE      tonsil at age 3    COLONOSCOPY      JOINT REPLACEMENT      MS ARTHRP KNE CONDYLE&PLATU MEDIAL&LAT COMPARTMENTS Left 02/28/2024    Procedure: ARTHROPLASTY KNEE TOTAL W ROBOT;  Surgeon: Ignacio Leblanc MD;  [facility];  Service: Orthopedics       Family  History:  Family history reviewed and non-contributory  History reviewed. No pertinent family history.    Social History:  Social History     Socioeconomic History    Marital status: /Civil Union     Spouse name: None    Number of children: None    Years of education: None    Highest education level: None   Occupational History    None   Tobacco Use    Smoking status: Never    Smokeless tobacco: Never   Vaping Use    Vaping status: Never Used   Substance and Sexual Activity    Alcohol use: Yes     Alcohol/week: 7.0 standard drinks of alcohol     Types: 7 Glasses of wine per week     Comment: social    Drug use: Never    Sexual activity: Yes     Partners: Male   Other Topics Concern    None   Social History Narrative    None     Social Determinants of Health     Financial Resource Strain: Low Risk  (11/30/2023)    [facility], [facility]    Overall Financial Resource Strain (CARDIA)     Difficulty of Paying Living Expenses: Not hard at all   Food Insecurity: No Food Insecurity (11/30/2023)    Received from [facility] and Affiliates, [facility] Its Subsidiaries and Affiliates    Hunger Vital Sign     Worried About Running Out of Food in the Last Year: Never true     Ran Out of Food in the Last Year: Never true   Transportation Needs: No Transportation Needs (11/30/2023)    Received from [facility] and Affiliates, [facility] Sub[facility] and Affiliates    PRAPARE - Transportation     Lack of Transportation (Medical): No     Lack of Transportation (Non-Medical): No   Physical Activity: Sufficiently Active (11/30/2023)    [facility], [facility]    Exercise Vital Sign     Days of Exercise per Week: 3 days     Minutes of Exercise per Session: 60 min   Stress: No Stress Concern Present (11/30/2023)    [facility], [facility]    Citizen of [place] of Occupational Health - Occupational Stress Questionnaire     Feeling of Stress : Not at all   Social Connections: Socially Integrated (11/30/2023)    [facility], [facility] and [facility]    Social Connection and Isolation Panel [NHANES]     Frequency of Communication with Friends and Family: More than three times a week     Frequency of Social Gatherings with Friends and Family: More than three times a week     Attends Anabaptist Services: More than 4 times per year     Active Member of Clubs or Organizations: Yes     Attends Club or Organization Meetings: More than 4 times per year     Marital Status:    Intimate Partner Violence: Low Risk  (8/7/2023)    Received from [facility], [facility]    SDOH Do You Feel Safe At Home     Do you feel safe at home?: Yes   Housing Stability: Not on file       Allergies:   Allergies   Allergen Reactions    Amlodipine Swelling and Other (See Comments)     Ankle swelling      Atorvastatin Cough and Myalgia     Cough    Penicillins Rash    Sulfa Antibiotics Rash           Labs:  0   Lab Value Date/Time    HCT 40.3 10/11/2024 0845    HCT 31.6 (L) 02/29/2024 0535    HCT 40.5 02/07/2024 0816    HCT 41.5 10/29/2015 0914    HCT 40.7 08/07/2014 1043    HGB 13.5 10/11/2024 0845    HGB 10.4 (L) 02/29/2024  "0535    HGB 13.7 02/07/2024 0816    HGB 14.2 10/29/2015 0914    HGB 13.2 08/07/2014 1043    INR 0.92 10/11/2024 0845    WBC 5.70 10/11/2024 0845    WBC 12.04 (H) 02/29/2024 0535    WBC 5.70 02/07/2024 0816    WBC 5.65 10/29/2015 0914    WBC 3.99 (L) 08/07/2014 1043       Meds:    Current Facility-Administered Medications:     bupivacaine liposomal (EXPAREL) 1.3 % injection 20 mL, 20 mL, Infiltration, Once, Kt Cardoso MD    ceFAZolin (ANCEF) IVPB (premix in dextrose) 2,000 mg 50 mL, 2,000 mg, Intravenous, Once, Jb Viramontes PA-C    lactated ringers infusion, 125 mL/hr, Intravenous, Continuous, Jb Viramontes PA-C, Last Rate: 125 mL/hr at 11/04/24 1238, 125 mL/hr at 11/04/24 1238    midazolam (VERSED) injection 2 mg, 2 mg, Intravenous, Once, Lincoln Palencia MD    tranexamic acid (CYKLOKAPRON) 1000-0.7 MG/100ML-% injection 1,000 mg, 1,000 mg, Intravenous, Once, Jb Viramontes PA-C    Blood Culture:   No results found for: \"BLOODCX\"    Wound Culture:   No results found for: \"WOUNDCULT\"    Ins and Outs:  No intake/output data recorded.          Physical Exam  /77   Pulse 89   Temp 98 °F (36.7 °C) (Temporal)   Resp 16   Ht 5' 6\" (1.676 m)   Wt 90.2 kg (198 lb 12.8 oz)   SpO2 98%   BMI 32.09 kg/m²   /77   Pulse 89   Temp 98 °F (36.7 °C) (Temporal)   Resp 16   Ht 5' 6\" (1.676 m)   Wt 90.2 kg (198 lb 12.8 oz)   SpO2 98%   BMI 32.09 kg/m²   Gen: No acute distress, resting comfortably in bed  HEENT: Eyes clear, moist mucus membranes, hearing intact  Respiratory: No audible wheezing or stridor  Cardiovascular: Well Perfused peripherally, 2+ distal pulse  Abdomen: nondistended, no peritoneal signs  Ortho Exam: limited ROM due to pain and mechanical blocking  Neuro Exam: intact    Lab Results: Reviewed  Imaging: Reviewed   "

## 2024-11-05 VITALS
HEART RATE: 66 BPM | HEIGHT: 66 IN | DIASTOLIC BLOOD PRESSURE: 76 MMHG | OXYGEN SATURATION: 94 % | TEMPERATURE: 98.8 F | RESPIRATION RATE: 18 BRPM | SYSTOLIC BLOOD PRESSURE: 127 MMHG | BODY MASS INDEX: 31.95 KG/M2 | WEIGHT: 198.8 LBS

## 2024-11-05 LAB
ALBUMIN SERPL BCG-MCNC: 3.5 G/DL (ref 3.5–5)
ALP SERPL-CCNC: 56 U/L (ref 34–104)
ALT SERPL W P-5'-P-CCNC: 28 U/L (ref 7–52)
ANION GAP SERPL CALCULATED.3IONS-SCNC: 8 MMOL/L (ref 4–13)
AST SERPL W P-5'-P-CCNC: 24 U/L (ref 13–39)
BILIRUB SERPL-MCNC: 0.38 MG/DL (ref 0.2–1)
BUN SERPL-MCNC: 16 MG/DL (ref 5–25)
CALCIUM SERPL-MCNC: 8.9 MG/DL (ref 8.4–10.2)
CHLORIDE SERPL-SCNC: 101 MMOL/L (ref 96–108)
CO2 SERPL-SCNC: 27 MMOL/L (ref 21–32)
CREAT SERPL-MCNC: 0.96 MG/DL (ref 0.6–1.3)
ERYTHROCYTE [DISTWIDTH] IN BLOOD BY AUTOMATED COUNT: 12.7 % (ref 11.6–15.1)
GFR SERPL CREATININE-BSD FRML MDRD: 61 ML/MIN/1.73SQ M
GLUCOSE P FAST SERPL-MCNC: 122 MG/DL (ref 65–99)
GLUCOSE SERPL-MCNC: 122 MG/DL (ref 65–140)
HCT VFR BLD AUTO: 32.3 % (ref 34.8–46.1)
HGB BLD-MCNC: 11.1 G/DL (ref 11.5–15.4)
MCH RBC QN AUTO: 36 PG (ref 26.8–34.3)
MCHC RBC AUTO-ENTMCNC: 34.4 G/DL (ref 31.4–37.4)
MCV RBC AUTO: 105 FL (ref 82–98)
PLATELET # BLD AUTO: 241 THOUSANDS/UL (ref 149–390)
PMV BLD AUTO: 9.7 FL (ref 8.9–12.7)
POTASSIUM SERPL-SCNC: 4 MMOL/L (ref 3.5–5.3)
PROT SERPL-MCNC: 5.9 G/DL (ref 6.4–8.4)
RBC # BLD AUTO: 3.08 MILLION/UL (ref 3.81–5.12)
SODIUM SERPL-SCNC: 136 MMOL/L (ref 135–147)
WBC # BLD AUTO: 10.61 THOUSAND/UL (ref 4.31–10.16)

## 2024-11-05 PROCEDURE — 85027 COMPLETE CBC AUTOMATED: CPT

## 2024-11-05 PROCEDURE — 99024 POSTOP FOLLOW-UP VISIT: CPT | Performed by: PHYSICIAN ASSISTANT

## 2024-11-05 PROCEDURE — 97163 PT EVAL HIGH COMPLEX 45 MIN: CPT | Performed by: PHYSICAL THERAPIST

## 2024-11-05 PROCEDURE — G0008 ADMIN INFLUENZA VIRUS VAC: HCPCS | Performed by: ORTHOPAEDIC SURGERY

## 2024-11-05 PROCEDURE — 80053 COMPREHEN METABOLIC PANEL: CPT

## 2024-11-05 PROCEDURE — 90662 IIV NO PRSV INCREASED AG IM: CPT | Performed by: ORTHOPAEDIC SURGERY

## 2024-11-05 RX ORDER — LOSARTAN POTASSIUM 25 MG/1
100 TABLET ORAL DAILY
Status: CANCELLED | OUTPATIENT
Start: 2024-11-05

## 2024-11-05 RX ORDER — LEVOTHYROXINE SODIUM 25 UG/1
75 TABLET ORAL
Status: DISCONTINUED | OUTPATIENT
Start: 2024-11-05 | End: 2024-11-05 | Stop reason: HOSPADM

## 2024-11-05 RX ORDER — METOPROLOL SUCCINATE 50 MG/1
50 TABLET, EXTENDED RELEASE ORAL DAILY
Status: DISCONTINUED | OUTPATIENT
Start: 2024-11-05 | End: 2024-11-05 | Stop reason: HOSPADM

## 2024-11-05 RX ORDER — PAROXETINE 20 MG/1
10 TABLET, FILM COATED ORAL DAILY
Status: DISCONTINUED | OUTPATIENT
Start: 2024-11-05 | End: 2024-11-05 | Stop reason: HOSPADM

## 2024-11-05 RX ORDER — METOPROLOL SUCCINATE 50 MG/1
50 TABLET, EXTENDED RELEASE ORAL DAILY
Status: CANCELLED | OUTPATIENT
Start: 2024-11-05

## 2024-11-05 RX ORDER — EZETIMIBE 10 MG/1
10 TABLET ORAL DAILY
Status: DISCONTINUED | OUTPATIENT
Start: 2024-11-05 | End: 2024-11-05 | Stop reason: HOSPADM

## 2024-11-05 RX ORDER — HYDROCHLOROTHIAZIDE 25 MG/1
25 TABLET ORAL DAILY
Status: DISCONTINUED | OUTPATIENT
Start: 2024-11-05 | End: 2024-11-05 | Stop reason: HOSPADM

## 2024-11-05 RX ORDER — PANTOPRAZOLE SODIUM 40 MG/1
40 TABLET, DELAYED RELEASE ORAL
Status: DISCONTINUED | OUTPATIENT
Start: 2024-11-05 | End: 2024-11-05 | Stop reason: HOSPADM

## 2024-11-05 RX ORDER — LEVOTHYROXINE SODIUM 25 UG/1
75 TABLET ORAL
Status: CANCELLED | OUTPATIENT
Start: 2024-11-05

## 2024-11-05 RX ADMIN — CEFAZOLIN SODIUM 1000 MG: 1 SOLUTION INTRAVENOUS at 06:31

## 2024-11-05 RX ADMIN — DOCUSATE SODIUM 100 MG: 100 CAPSULE, LIQUID FILLED ORAL at 09:01

## 2024-11-05 RX ADMIN — GABAPENTIN 100 MG: 100 CAPSULE ORAL at 09:01

## 2024-11-05 RX ADMIN — SODIUM CHLORIDE, SODIUM LACTATE, POTASSIUM CHLORIDE, AND CALCIUM CHLORIDE 125 ML/HR: .6; .31; .03; .02 INJECTION, SOLUTION INTRAVENOUS at 02:09

## 2024-11-05 RX ADMIN — GABAPENTIN 100 MG: 100 CAPSULE ORAL at 02:07

## 2024-11-05 RX ADMIN — ENOXAPARIN SODIUM 40 MG: 40 INJECTION SUBCUTANEOUS at 08:58

## 2024-11-05 RX ADMIN — PANTOPRAZOLE SODIUM 40 MG: 40 TABLET, DELAYED RELEASE ORAL at 05:58

## 2024-11-05 RX ADMIN — LEVOTHYROXINE SODIUM 75 MCG: 25 TABLET ORAL at 05:43

## 2024-11-05 RX ADMIN — EZETIMIBE 10 MG: 10 TABLET ORAL at 09:01

## 2024-11-05 RX ADMIN — ACETAMINOPHEN 325MG 975 MG: 325 TABLET ORAL at 09:17

## 2024-11-05 RX ADMIN — METHOCARBAMOL TABLETS 500 MG: 500 TABLET, COATED ORAL at 05:43

## 2024-11-05 RX ADMIN — PAROXETINE 10 MG: 20 TABLET, FILM COATED ORAL at 09:01

## 2024-11-05 RX ADMIN — INFLUENZA A VIRUS A/VICTORIA/4897/2022 IVR-238 (H1N1) ANTIGEN (FORMALDEHYDE INACTIVATED), INFLUENZA A VIRUS A/CALIFORNIA/122/2022 SAN-022 (H3N2) ANTIGEN (FORMALDEHYDE INACTIVATED), AND INFLUENZA B VIRUS B/MICHIGAN/01/2021 ANTIGEN (FORMALDEHYDE INACTIVATED) 0.5 ML: 60; 60; 60 INJECTION, SUSPENSION INTRAMUSCULAR at 10:37

## 2024-11-05 RX ADMIN — OXYCODONE HYDROCHLORIDE 10 MG: 10 TABLET ORAL at 10:28

## 2024-11-05 RX ADMIN — OXYCODONE HYDROCHLORIDE 10 MG: 10 TABLET ORAL at 05:47

## 2024-11-05 NOTE — PROGRESS NOTES
Progress Note - Surgery-General   Name: Delia Morin 64 y.o. female I MRN: [id number]  Unit/Bed#: [facility] 2 N -01 I Date of Admission: 11/4/2024       Assessment & Plan  Primary osteoarthritis of right knee  S/P right total knee arthroplasty. Doing well. Pain controlled  Incentive spirometry  Weight Bearing as tolerated RLE  Up and out of bed with walker  DVT prophylaxis - SCDs and Lovenox  Drain: remove before discharge  Analgesics and ice  PT/OT  Will continue to assess for acute blood loss anemia    Primary hypertension  -continue metoprolol and hctz  -can resume diovan when discharged home  Acquired hypothyroidism  -continue levothyroxine  Mixed hyperlipidemia  -continue ezetimibe  Gastroesophageal reflux disease  -continue PPI  SVT (supraventricular tachycardia) (HCC)  -will monitor    Depression  -continue paroxetine    Subjective   63 yo s/p right total knee arthroplasty. She reports her pain is controlled. She denies dizziness, chest pain, or n/v. She denies tingling in her right leg.    Objective :  Temp:  [97.7 °F (36.5 °C)-98.8 °F (37.1 °C)] 98.8 °F (37.1 °C)  HR:  [62-89] 74  BP: (107-130)/(66-81) 107/66  Resp:  [14-22] 18  SpO2:  [90 %-98 %] 93 %  O2 Device: None (Room air)    I/O         11/03 0701  11/04 0700 11/04 0701  11/05 0700    I.V. (mL/kg)  1233.3 (13.7)    IV Piggyback  50    Total Intake(mL/kg)  1283.3 (14.2)    Urine (mL/kg/hr)  200    Drains  30    Total Output  230    Net  +1053.3                Lines/Drains/Airways       Active Status       Name Placement date Placement time Site Days    Closed/Suction Drain Anterior;Right Knee Accordion 10 Fr. 11/04/24  1613  Knee  less than 1                  Physical Exam  General: Alert and oriented x 4  CV: RRR; no murmurs, gallops, or rubs  Resp: No SOB; lungs CTA - no wheezes, rales or rhonchi  Abd: BS x 4 quadrants, soft and nontender    Right lower extremity:  Dressings C/D/I  Toes warm and well perfused  Motor and sensation grossly intact  "distally  HV - 30 cc since surgery  2+ DP Pulse      Lab Results: I have reviewed the following results:  No results for input(s): \"WBC\", \"HGB\", \"HCT\", \"PLT\", \"BANDSPCT\", \"SODIUM\", \"K\", \"CL\", \"CO2\", \"BUN\", \"CREATININE\", \"GLUC\", \"CAIONIZED\", \"MG\", \"PHOS\", \"AST\", \"ALT\", \"ALB\", \"TBILI\", \"DBILI\", \"ALKPHOS\", \"PTT\", \"INR\", \"HSTNI0\", \"HSTNI2\", \"BNP\", \"LACTICACID\" in the last 72 hours.    Imaging Results Review: No pertinent imaging studies reviewed.  Other Study Results Review: No additional pertinent studies reviewed.    VTE Pharmacologic Prophylaxis: VTE covered by:  enoxaparin, Subcutaneous     VTE Mechanical Prophylaxis: sequential compression device  "

## 2024-11-05 NOTE — PHYSICAL THERAPY NOTE
PT EVALUATION    Pt. Name: Delia Morin  Pt. Age: 64 y.o.  MRN: [id number]  LENGTH OF STAY: 0    Patient Active Problem List   Diagnosis    Primary osteoarthritis of both knees    Vitamin D deficiency    SVT (supraventricular tachycardia) (HCC)    Single major depressive episode, in full remission (HCC)    Raynaud's syndrome without gangrene    Primary hypertension    Mixed hyperlipidemia    History of hepatitis B    Hiatal hernia    Hepatic steatosis    Allergic rhinitis    Acquired hypothyroidism    Gastroesophageal reflux disease    Elevated MCV    Primary osteoarthritis of left knee    S/P total knee arthroplasty, left    Acute blood loss anemia    Leukocytosis    Primary osteoarthritis of right knee       Admitting Diagnoses:   Primary osteoarthritis of right knee [M17.11]    Past Medical History:   Diagnosis Date    Disease of thyroid gland     GERD (gastroesophageal reflux disease)     History of hepatitis B     Hyperlipidemia     Hypertension        Past Surgical History:   Procedure Laterality Date    ABCESS DRAINAGE      tonsil at age 3    COLONOSCOPY      JOINT REPLACEMENT      [facility] ARTHRP KNE CONDYLE&PLATU MEDIAL&LAT COMPARTMENTS Left 02/28/2024    Procedure: ARTHROPLASTY KNEE TOTAL W ROBOT;  Surgeon: Ignacio Leblanc MD;  Location: [facility];  Service: Orthopedics       Imaging Studies:  No orders to display        11/05/24 0846   PT Last Visit   PT Visit Date 11/05/24   Note Type   Note type Evaluation   Additional Comments pt greeted in recliner chair   Pain Assessment   Pain Assessment Tool 0-10   Pain Score 5   Pain Location/Orientation Orientation: Right;Location: Knee   Hospital Pain Intervention(s) Repositioned;Ambulation/increased activity;Elevated;Emotional support;Rest   Restrictions/Precautions   Weight Bearing Precautions Per Order Yes   RLE Weight Bearing Per Order WBAT   Other Precautions Chair Alarm;Bed Alarm;Fall Risk;Pain;Multiple lines   Home Living   Type of Home House    Home Layout Two level;1/2 bath on main level;Performs ADLs on one level;Able to live on main level with bedroom/bathroom  (walk in shower on 1st floor)   Bathroom Shower/Tub Walk-in shower   Bathroom Toilet Raised   Bathroom Equipment Built-in shower seat   Bathroom Accessibility Accessible   Home Equipment Walker;Cane   Additional Comments pt can stay on 1st floor, full bed/bath. 2 steps to enter from garage   Prior Function   Level of [place] Independent with ADLs;Independent with functional mobility;Independent with IADLS   Lives With Spouse   Receives Help From Family   IADLs Independent with driving;Independent with meal prep;Independent with medication management   Falls in the last 6 months 0   Vocational Retired   General   Family/Caregiver Present No   Cognition   Overall Cognitive Status WFL   Arousal/Participation Alert   Orientation Level Oriented X4   Following Commands Follows all commands and directions without difficulty   Comments pt is motivated and pleasant   RUE Assessment   RUE Assessment WFL   LUE Assessment   LUE Assessment WFL   RLE Assessment   RLE Assessment X  (did not assess knee due to post op, able to flex hip and move ankle through normal ROM)   LLE Assessment   LLE Assessment WFL   Light Touch   RLE Light Touch Grossly intact   LLE Light Touch Grossly intact   Bed Mobility   Supine to Sit Unable to assess   Sit to Supine Unable to assess   Additional Comments pt greeted in recliner chair   Transfers   Sit to Stand 5  Supervision   Additional items Increased time required;Verbal cues  (RW)   Stand to Sit 5  Supervision   Additional items Increased time required;Verbal cues  (RW)   Additional Comments cues for hand placement and RW safety   Ambulation/Elevation   Gait pattern Decreased R stance;Improper Weight shift;Antalgic;Step to;Excessively slow;Decreased toe off;Decreased heel strike   Gait Assistance 5  Supervision   Additional items Verbal cues   Assistive Device Rolling  walker   Distance 110'x2   Stair Management Assistance 5  Supervision   Additional items Verbal cues;Increased time required   Stair Management Technique No rails;With walker;Foreward;Step to pattern   Number of Stairs 4   Ambulation/Elevation Additional Comments cues for LE sequencing during stairs   Balance   Static Sitting Good   Dynamic Sitting Fair +   Static Standing Fair   Dynamic Standing Fair -   Ambulatory Fair -   Endurance Deficit   Endurance Deficit No   Activity Tolerance   Activity Tolerance Patient tolerated treatment well   Medical Staff Made Aware RN Gabriella   Nurse Made Aware yes   Assessment   Prognosis Good   Problem List Decreased strength;Decreased range of motion;Decreased endurance;Impaired balance;Decreased mobility;Orthopedic restrictions;Pain   Assessment Pt is a 64 y.o. female who presented to [facility] on [facility]/2024 s/p R TKA done by WBAT. Precautions include WBAT. Pt  has a past medical history of Disease of thyroid gland, GERD (gastroesophageal reflux disease), History of hepatitis B, Hyperlipidemia, and Hypertension.. Pt greeted in recliner chair for PT evaluation on 11/05/24. Pt referred to PT for functional mobility evaluation & D/C planning w/ orders of activity beginning POD #0 and activity as tolerated. PTA, pt reports being I w/o AD and I w/ IADLs. Personal factors affecting pt at time of IE include: lives in 2 story house and stairs to enter home. Please find objective findings from PT assessment regarding body systems outlined above with impairments and limitations including weakness, decreased ROM, impaired balance, decreased endurance, gait deviations, pain, decreased activity tolerance, decreased functional mobility tolerance, fall risk, and orthopedic restrictions.  Please see flow sheet above for objective findings and level of assistance required for safe completion of functional tasks. Pt was able to perform sit<>stand with RW and S. Pt able to perform 110'x2 with RW and S.  Pt then performed 4 steps with RW and S. Cues needed for LE sequencing during stairs, but good carryover. Pt demonstrated dec endurance and tolerance to activity. Denies reports of dizziness or SOB t/o session. Patient was left in recliner chair  with call bell and all needs within reach. Pt was educated on fall precautions and reinforced w/ good understanding. Based on pt presentation and impaired function, pt would benefit from level III, (minimal resource intensity) at D/C. From PT/mobility standpoint, pt would benefit from OPPT to address deficits as defined above and maximize level of independence and return pt to PLOF. HEP given and reviewed with patient, no questions at this time. CM to follow. Nsg staff to continue to mobilized pt (OOB in chair for all meals & ambulate in room/unit) as tolerated to prevent further decline in function. Nsg notified. Co-eval performed with OT to complete this evaluation for the pts best interest given pts medical complexity and functional level.   Barriers to Discharge None   Goals   Patient Goals to go home   STG Expiration Date 11/12/24   Short Term Goal #1 1). Pt will perform bed mobility with Yousif demonstrating appropriate technique 100% of the time in order to improve function.2)  Perform all transfers with Yousif demonstrating safe and appropriate technique 100% of the time in order to improve ability to negotiate safely in home environment.3) Amb with least restrictive AD > 200'x1 with Yousif in order to demonstrate ability to negotiate in home environment.4)  Improve overall strength and balance 1/2 grade in order to optimize ability to perform functional tasks and reduce fall risk.5) Increase activity tolerance to 45 minutes in order to improve endurance to functional tasks.6)  Negotiate stairs using most appropriate technique and Yousif in order to be able to negotiate safely in home environment. 7) PT for ongoing patient and family/caregiver education, DME needs and d/c  planning in order to promote highest level of function in least restrictive environment.   Plan   Treatment/Interventions Functional transfer training;LE strengthening/ROM;Elevations;Therapeutic exercise;Endurance training;Spoke to nursing;OT;Family;Bed mobility;Gait training   PT Frequency Twice a day  (prn)   Discharge Recommendation   Rehab Resource Intensity Level, PT III (Minimum Resource Intensity)   Equipment Recommended Walker  (pt owns)   Additional Comments The patient's AM-PAC Basic Mobility Inpatient Short Form Raw Score is 22. A Raw score of greater than 16 suggests the patient may benefit from discharge to home. Please also refer to the recommendation of the Physical Therapist for safe discharge planning.   AM-PAC Basic Mobility Inpatient   Turning in Flat Bed Without Bedrails 4   Lying on Back to Sitting on Edge of Flat Bed Without Bedrails 4   Moving Bed to Chair 4   Standing Up From Chair Using Arms 4   Walk in Room 3   Climb 3-5 Stairs With Railing 3   Basic Mobility Inpatient Raw Score 22   Basic Mobility Standardized Score 47.4   [facility] Highest Level Of Mobility   JH-HLM Goal 7: Walk 25 feet or more   JH-HLM Achieved 7: Walk 25 feet or more   End of Consult   Patient Position at End of Consult All needs within reach;Bedside chair   End of Consult Comments pt stable, left in recliner chair with call bell within reach. RN updated     Hx/personal factors: co-morbidities, use of AD, pain, h/o of falls, and fall risk  Examination: dec mobility, dec balance, dec endurance, dec amb, risk for falls, pain, assessed body system, balance, endurance, amb, D/C disposition & fall risk, WB restrictions, impairements in locomotion, musculoskeletal, balance, endurance, posture, coordination  Clinical: unpredictable (ongoing medical status, risk for falls, POD #0, and pain mgt)  Complexity: high  Bertha Hull, PT

## 2024-11-05 NOTE — PROGRESS NOTES
Progress Note - Orthopedics   Name: Delia Morin 64 y.o. female I MRN: [id number]  Unit/Bed#: WE 2 N -01 I Date of Admission: 11/4/2024   Date of Service: 11/5/2024 I Hospital Day: 0     Assessment & Plan  Primary osteoarthritis of right knee    Weight Bearing as tolerated - RLE  Up and out of bed with assistance  Incentive spirometer  DVT prophylaxis - mechanical and chemical (Lovenox).  Drain: pulled.  Dressing reinforced  Analgesics  PT/OT  Patient noted to have acute blood loss anemia due to a drop in Hbg of > 2.0g from preop levels, will monitor vital signs and resuscitate with IV fluids as needed  Discharge plan - home today pending PT     SVT (supraventricular tachycardia) (HCC)    Primary hypertension    Mixed hyperlipidemia    Acquired hypothyroidism    Gastroesophageal reflux disease        Subjective   64 y.o.female POD 1 s/p right total knee with Dr Leblanc.  No acute events, no new complaints. Pain well controlled. Denies fevers, chills, CP, SOB, N/V, numbness or tingling. Patient reports no issues with urination or bowel movements. Patient states she has some knee pain but it is controlled with medication.    Objective :  Temp:  [97.7 °F (36.5 °C)-98.8 °F (37.1 °C)] 98 °F (36.7 °C)  HR:  [62-89] 75  BP: (107-130)/(66-81) 117/74  Resp:  [14-22] 18  SpO2:  [90 %-98 %] 91 %  O2 Device: None (Room air)    Gen: AAOx3; nAD  Skin: warm and dry  EENT: EOMI, hearing intact, trachea midline  Physical Exam  Musculoskeletal: rightlower  Skin warm and dry . No erythema or ecchymosis.  Dressing C-D-I  Drain in place and functioning  TTP - about the knee  Toes warm and mobile  Motor intact to +FHL/EHL, +ankle dorsi/plantar flexion  Sensation intact to saphenous, sural, tibial, superficial peroneal nerve, and deep peroneal  No calf swelling or tenderness to palpation      Lab Results: I have reviewed the following results:  Recent Labs     11/05/24  0452   WBC 10.61*   HGB 11.1*   HCT 32.3*      BUN 16    CREATININE 0.96

## 2024-11-05 NOTE — QUICK NOTE
"S: 63 yo POD1 s/p right total knee arthroplasty. Patient is doing well postop. No new complaints at this time. No incidents overnight.     O: Afeb VSS. Blood pressure 117/74, pulse 75, temperature 98 °F (36.7 °C), temperature source Oral, resp. rate 18, height 5' 6\" (1.676 m), weight 90.2 kg (198 lb 12.8 oz), SpO2 91%.,Body mass index is 32.09 kg/m².    Recent Labs     11/05/24  0452   WBC 10.61*   HGB 11.1*      SODIUM 136   K 4.0      CO2 27   BUN 16   CREATININE 0.96   GLUC 122   CALCIUM 8.9   AST 24   ALT 28   ALKPHOS 56   TBILI 0.38     HV drain - 180 cc since surgery  Dressing C/D/I. Motor and sensation grossly intact.     A/P: 63 yo POD1 s/p right total knee arthroplasty.  -PT/OT  -Remove drain before discharge  -Discharge when cleared by PT    Montse Silva PA-C    "

## 2024-11-05 NOTE — PLAN OF CARE
Problem: PAIN - ADULT  Goal: Verbalizes/displays adequate comfort level or baseline comfort level  Description: Interventions:  - Encourage patient to monitor pain and request assistance  - Assess pain using appropriate pain scale  - Administer analgesics based on type and severity of pain and evaluate response  - Implement non-pharmacological measures as appropriate and evaluate response  - Consider cultural and social influences on pain and pain management  - Notify physician/advanced practitioner if interventions unsuccessful or patient reports new pain  11/5/2024 0744 by Gabriella Hansen RN  Outcome: Progressing  11/4/2024 1758 by Gabriella Hansen, RN  Outcome: Progressing

## 2024-11-05 NOTE — PLAN OF CARE
Problem: PAIN - ADULT  Goal: Verbalizes/displays adequate comfort level or baseline comfort level  Description: Interventions:  - Encourage patient to monitor pain and request assistance  - Assess pain using appropriate pain scale  - Administer analgesics based on type and severity of pain and evaluate response  - Implement non-pharmacological measures as appropriate and evaluate response  - Consider cultural and social influences on pain and pain management  - Notify physician/advanced practitioner if interventions unsuccessful or patient reports new pain  11/5/2024 0914 by Gabriella Hansen RN  Outcome: Adequate for Discharge  11/5/2024 0744 by Gabriella Hansen RN  Outcome: Progressing     Problem: INFECTION - ADULT  Goal: Absence or prevention of progression during hospitalization  Description: INTERVENTIONS:  - Assess and monitor for signs and symptoms of infection  - Monitor lab/diagnostic results  - Monitor all insertion sites, i.e. indwelling lines, tubes, and drains  - Monitor endotracheal if appropriate and nasal secretions for changes in amount and color  - [place] appropriate cooling/warming therapies per order  - Administer medications as ordered  - Instruct and encourage patient and family to use good hand hygiene technique  - Identify and instruct in appropriate isolation precautions for identified infection/condition  Outcome: Adequate for Discharge  Goal: Absence of fever/infection during neutropenic period  Description: INTERVENTIONS:  - Monitor WBC    Outcome: Adequate for Discharge     Problem: SAFETY ADULT  Goal: Patient will remain free of falls  Description: INTERVENTIONS:  - Educate patient/family on patient safety including physical limitations  - Instruct patient to call for assistance with activity   - Consult OT/PT to assist with strengthening/mobility   - Keep Call bell within reach  - Keep bed low and locked with side rails adjusted as appropriate  - Keep care items and personal  belongings within reach  - Initiate and maintain comfort rounds  - Make Fall Risk Sign visible to staff  - Offer Toileting every 2 Hours, in advance of need  - Initiate/Maintain chair alarm  - Obtain necessary fall risk management equipment:   - Apply yellow socks and bracelet for high fall risk patients  - Consider moving patient to room near nurses station  Outcome: Adequate for Discharge  Goal: Maintain or return to baseline ADL function  Description: INTERVENTIONS:  -  Assess patient's ability to carry out ADLs; assess patient's baseline for ADL function and identify physical deficits which impact ability to perform ADLs (bathing, care of mouth/teeth, toileting, grooming, dressing, etc.)  - Assess/evaluate cause of self-care deficits   - Assess range of motion  - Assess patient's mobility; develop plan if impaired  - Assess patient's need for assistive devices and provide as appropriate  - Encourage maximum independence but intervene and supervise when necessary  - Involve family in performance of ADLs  - Assess for home care needs following discharge   - Consider OT consult to assist with ADL evaluation and planning for discharge  - Provide patient education as appropriate  Outcome: Adequate for Discharge  Goal: Maintains/Returns to pre admission functional level  Description: INTERVENTIONS:  - Perform AM-PAC 6 Click Basic Mobility/ Daily Activity assessment daily.  - Set and communicate daily mobility goal to care team and patient/family/caregiver.   - Collaborate with rehabilitation services on mobility goals if consulted  - Perform Range of Motion 3 times a day.  - Reposition patient every 2 hours.  - Dangle patient 3 times a day  - Stand patient 3 times a day  - Ambulate patient 3 times a day  - Out of bed to chair 3 times a day   - Out of bed for meals 3 times a day  - Out of bed for toileting  - Record patient progress and toleration of activity level   Outcome: Adequate for Discharge     Problem:  DISCHARGE PLANNING  Goal: Discharge to home or other facility with appropriate resources  Description: INTERVENTIONS:  - Identify barriers to discharge w/patient and caregiver  - Arrange for needed discharge resources and transportation as appropriate  - Identify discharge learning needs (meds, wound care, etc.)  - Arrange for interpretive services to assist at discharge as needed  - Refer to Case Management Department for coordinating discharge planning if the patient needs post-hospital services based on physician/advanced practitioner order or complex needs related to functional status, cognitive ability, or social support system  Outcome: Adequate for Discharge

## 2024-11-05 NOTE — PLAN OF CARE
Problem: PAIN - ADULT  Goal: Verbalizes/displays adequate comfort level or baseline comfort level  Description: Interventions:  - Encourage patient to monitor pain and request assistance  - Assess pain using appropriate pain scale  - Administer analgesics based on type and severity of pain and evaluate response  - Implement non-pharmacological measures as appropriate and evaluate response  - Consider cultural and social influences on pain and pain management  - Notify physician/advanced practitioner if interventions unsuccessful or patient reports new pain  Outcome: Progressing     Problem: INFECTION - ADULT  Goal: Absence or prevention of progression during hospitalization  Description: INTERVENTIONS:  - Assess and monitor for signs and symptoms of infection  - Monitor lab/diagnostic results  - Monitor all insertion sites, i.e. indwelling lines, tubes, and drains  - Monitor endotracheal if appropriate and nasal secretions for changes in amount and color  - [place] appropriate cooling/warming therapies per order  - Administer medications as ordered  - Instruct and encourage patient and family to use good hand hygiene technique  - Identify and instruct in appropriate isolation precautions for identified infection/condition  Outcome: Progressing  Goal: Absence of fever/infection during neutropenic period  Description: INTERVENTIONS:  - Monitor WBC    Outcome: Progressing     Problem: SAFETY ADULT  Goal: Patient will remain free of falls  Description: INTERVENTIONS:  - Educate patient/family on patient safety including physical limitations  - Instruct patient to call for assistance with activity   - Consult OT/PT to assist with strengthening/mobility   - Keep Call bell within reach  - Keep bed low and locked with side rails adjusted as appropriate  - Keep care items and personal belongings within reach  - Initiate and maintain comfort rounds  - Make Fall Risk Sign visible to staff  - Offer Toileting every  Hours,  in advance of need  - Initiate/Maintain alarm  - Obtain necessary fall risk management equipment:   - Apply yellow socks and bracelet for high fall risk patients  - Consider moving patient to room near nurses station  Outcome: Progressing  Goal: Maintain or return to baseline ADL function  Description: INTERVENTIONS:  -  Assess patient's ability to carry out ADLs; assess patient's baseline for ADL function and identify physical deficits which impact ability to perform ADLs (bathing, care of mouth/teeth, toileting, grooming, dressing, etc.)  - Assess/evaluate cause of self-care deficits   - Assess range of motion  - Assess patient's mobility; develop plan if impaired  - Assess patient's need for assistive devices and provide as appropriate  - Encourage maximum independence but intervene and supervise when necessary  - Involve family in performance of ADLs  - Assess for home care needs following discharge   - Consider OT consult to assist with ADL evaluation and planning for discharge  - Provide patient education as appropriate  Outcome: Progressing  Goal: Maintains/Returns to pre admission functional level  Description: INTERVENTIONS:  - Perform AM-PAC 6 Click Basic Mobility/ Daily Activity assessment daily.  - Set and communicate daily mobility goal to care team and patient/family/caregiver.   - Collaborate with rehabilitation services on mobility goals if consulted  - Perform Range of Motion  times a day.  - Reposition patient every hours.  - Dangle patient  times a day  - Stand patient  times a day  - Ambulate patient  times a day  - Out of bed to chair  times a day   - Out of bed for meals times a day  - Out of bed for toileting  - Record patient progress and toleration of activity level   Outcome: Progressing     Problem: DISCHARGE PLANNING  Goal: Discharge to home or other facility with appropriate resources  Description: INTERVENTIONS:  - Identify barriers to discharge w/patient and caregiver  - Arrange for  needed discharge resources and transportation as appropriate  - Identify discharge learning needs (meds, wound care, etc.)  - Arrange for interpretive services to assist at discharge as needed  - Refer to Case Management Department for coordinating discharge planning if the patient needs post-hospital services based on physician/advanced practitioner order or complex needs related to functional status, cognitive ability, or social support system  Outcome: Progressing

## 2024-11-05 NOTE — ASSESSMENT & PLAN NOTE
S/P right total knee arthroplasty. Doing well. Pain controlled  Incentive spirometry  Weight Bearing as tolerated RLE  Up and out of bed with walker  DVT prophylaxis - SCDs and Lovenox  Drain: remove before discharge  Analgesics and ice  PT/OT  Will continue to assess for acute blood loss anemia

## 2024-11-05 NOTE — PLAN OF CARE
Problem: PHYSICAL THERAPY ADULT  Goal: Performs mobility at highest level of function for planned discharge setting.  See evaluation for individualized goals.  Description: Treatment/Interventions: Functional transfer training, LE strengthening/ROM, Elevations, Therapeutic exercise, Endurance training, Spoke to nursing, OT, Family, Bed mobility, Gait training  Equipment Recommended: Walker (pt owns)       See flowsheet documentation for full assessment, interventions and recommendations.  Note: Prognosis: Good  Problem List: Decreased strength, Decreased range of motion, Decreased endurance, Impaired balance, Decreased mobility, Orthopedic restrictions, Pain  Assessment: Pt is a 64 y.o. female who presented to [facility] on 11/4/2024 s/p R TKA done by WBAT. Precautions include WBAT. Pt  has a past medical history of Disease of thyroid gland, GERD (gastroesophageal reflux disease), History of hepatitis B, Hyperlipidemia, and Hypertension.. Pt greeted in recliner chair for PT evaluation on 11/05/24. Pt referred to PT for functional mobility evaluation & D/C planning w/ orders of activity beginning POD #0 and activity as tolerated. PTA, pt reports being I w/o AD and I w/ IADLs. Personal factors affecting pt at time of IE include: lives in 2 story house and stairs to enter home. Please find objective findings from PT assessment regarding body systems outlined above with impairments and limitations including weakness, decreased ROM, impaired balance, decreased endurance, gait deviations, pain, decreased activity tolerance, decreased functional mobility tolerance, fall risk, and orthopedic restrictions.  Please see flow sheet above for objective findings and level of assistance required for safe completion of functional tasks. Pt was able to perform sit<>stand with RW and S. Pt able to perform 110'x2 with RW and S. Pt then performed 4 steps with RW and S. Cues needed for LE sequencing during stairs, but good carryover. Pt  demonstrated dec endurance and tolerance to activity. Denies reports of dizziness or SOB t/o session. Patient was left in recliner chair  with call bell and all needs within reach. Pt was educated on fall precautions and reinforced w/ good understanding. Based on pt presentation and impaired function, pt would benefit from level III, (minimal resource intensity) at D/C. From PT/mobility standpoint, pt would benefit from OPPT to address deficits as defined above and maximize level of independence and return pt to PLOF. HEP given and reviewed with patient, no questions at this time. CM to follow. Nsg staff to continue to mobilized pt (OOB in chair for all meals & ambulate in room/unit) as tolerated to prevent further decline in function. Nsg notified. Co-eval performed with OT to complete this evaluation for the pts best interest given pts medical complexity and functional level.  Barriers to Discharge: None     Rehab Resource Intensity Level, PT: III (Minimum Resource Intensity)    See flowsheet documentation for full assessment.

## 2024-11-05 NOTE — ASSESSMENT & PLAN NOTE
Weight Bearing as tolerated - RLE  Up and out of bed with assistance  Incentive spirometer  DVT prophylaxis - mechanical and chemical (Lovenox).  Drain: pulled.  Dressing reinforced  Analgesics  PT/OT  Patient noted to have acute blood loss anemia due to a drop in Hbg of > 2.0g from preop levels, will monitor vital signs and resuscitate with IV fluids as needed  Discharge plan - home today pending PT

## 2024-11-06 ENCOUNTER — TELEPHONE (OUTPATIENT)
Dept: OBGYN CLINIC | Facility: HOSPITAL | Age: 64
End: 2024-11-06

## 2024-11-06 NOTE — TELEPHONE ENCOUNTER
"Patient contacted for a postoperative follow up assessment. Patient states current pain level of a  \"no pain\"  when sitting and 8/10 when walking with cane.  Patient denies increase in swelling and dressing is Dressing C/D/I Patient is icing the site regularly. NN educated patient on post-op swelling, icing, and constipation.    Confirmed upcoming appointments w/ patient:   PT 11/8  Postop 11/12     We reviewed patients AVS medication list. Patient is taking Tylenol 1000mg every 8 hours, Oxycodone 5mg every 6 hours, Lovenox injections daily, and Methocarbamol 500mg TID. Tylenol script was written for 1000mg every 6 hours, discussed max dosing 3000mg/24 hours taking every 8 hr vs 6 hr. Patient has not had a BM but is taking an OTC stool softener. Discussed postop constipation, increasing fluids/fiber, prunes, prune juice.      Patient denies nausea, vomiting, abdominal pain, chest pain, shortness of breath, fever, dizziness, and calf pain. Patient does not have any other questions or concerns at this time. Pt was encouraged to call with any questions, concerns or issues.    "

## 2024-11-08 ENCOUNTER — EVALUATION (OUTPATIENT)
Dept: PHYSICAL THERAPY | Facility: CLINIC | Age: 64
End: 2024-11-08
Payer: MEDICARE

## 2024-11-08 DIAGNOSIS — M17.11 PRIMARY OSTEOARTHRITIS OF RIGHT KNEE: Primary | ICD-10-CM

## 2024-11-08 DIAGNOSIS — Z01.812 PRE-OPERATIVE LABORATORY EXAMINATION: ICD-10-CM

## 2024-11-08 PROCEDURE — 97161 PT EVAL LOW COMPLEX 20 MIN: CPT | Performed by: PHYSICAL THERAPIST

## 2024-11-08 PROCEDURE — 97110 THERAPEUTIC EXERCISES: CPT | Performed by: PHYSICAL THERAPIST

## 2024-11-08 NOTE — PROGRESS NOTES
PT Evaluation     Today's date: 2024  Patient name: Delia Morin  : 1960  MRN: [id number]  Referring provider: Jb Viramontes*  Dx:   Encounter Diagnosis     ICD-10-CM    1. Primary osteoarthritis of right knee  M17.11 Ambulatory referral to Physical Therapy      2. Pre-operative laboratory examination  Z01.812 Ambulatory referral to Physical Therapy                     Assessment/Plan    Patient presents to PT post R TKA with pain and reduced ROM limiting functional capacity. Red flag screening for DVT and infection was negative. Primary goal of therapy will be restoring the patient's ROM as it is fairly limited. Increasing muscle activation of the quad muscle group and strengthening the LE musculature will also be goals of therapy. Patient will benefit from skilled PT services to address deficits and facilitate return to function.    Patient will attend physical therapy 2x/week. Patient provided with initial HEP to perform.    Evaluation was performed by Jose Mendez, SPT supervised by Ced Espinosa, PT.    LTG:  Patient will make full return to golfing 3x/week without limitation by symptoms.  Patient will be able to walk community distances without an assistive device.  Patient will be able to return to personal training sessions 2-3x/week without limitation by symptoms.    Subjective    Patient presents to PT post R TKA. Previously experiencing pain along anterior surface of lower leg, has since moved up to around the knee. Pain is with moving knee or walking, little to no pain at rest. Took off padding and wrapping yesterday, now covering incision with ace bandage. Taking pain medication and muscle relaxants according to prescription; applying ice regularly. Still reporting some swelling. Still numb around the knee. Denies excess redness, odor, or drainage from the wound; denies any signs of infection e.g., fever and malaise. Navigating with a SPC when going outside, uses  "furniture/wall to steady oneself at home. Lives in [place] years old & older community. 1 step to enter house, no railing but has doorframe to use for leverage. House has multiple levels but bathroom and bedroom is on first floor; reports no issues navigating home. Has  at home able to help, support from friends in area. Patient would like to return to golfing and exercising with her .    NPRS: C: 0, B: 0, W: 5      Objective    Palpation/Observation:  No tenderness noted in B/L calves  Moderate swelling & warmth noted around R knee, not warm at other points of R LE; incision is clean, dry & intact, no signs of infection; good incision mobility    ROM  Extension-Flexion: -25-90  Firm end-feel noted with extension, pain with end-range extension/flexion  Adequate patellar mobility, mild popping observed with passive motion/compression    Strength  Poor VMO contraction  Able to perform 10 SLR with limited extension/quad lag & moderate difficulty     Precautions: n/a      Manuals 11/8                                                                Neuro Re-Ed                                                                                                        Ther Ex             Sidelying hip abd 2x10            SLR 1x10            Gastroc stretch 4x30\"            Hamstring stretch 4x30\"            Heel slides 5\"x10                                                   Ther Activity                                       Gait Training                                       Modalities                                            "

## 2024-11-10 DIAGNOSIS — K21.9 GASTROESOPHAGEAL REFLUX DISEASE, UNSPECIFIED WHETHER ESOPHAGITIS PRESENT: ICD-10-CM

## 2024-11-11 RX ORDER — OMEPRAZOLE 40 MG/1
CAPSULE, DELAYED RELEASE ORAL
Qty: 90 CAPSULE | Refills: 1 | Status: SHIPPED | OUTPATIENT
Start: 2024-11-11 | End: 2025-01-09

## 2024-11-12 ENCOUNTER — OFFICE VISIT (OUTPATIENT)
Dept: OBGYN CLINIC | Facility: HOSPITAL | Age: 64
End: 2024-11-12

## 2024-11-12 ENCOUNTER — OFFICE VISIT (OUTPATIENT)
Dept: PHYSICAL THERAPY | Facility: CLINIC | Age: 64
End: 2024-11-12
Payer: MEDICARE

## 2024-11-12 ENCOUNTER — HOSPITAL ENCOUNTER (OUTPATIENT)
Dept: RADIOLOGY | Facility: HOSPITAL | Age: 64
Discharge: HOME | End: 2024-11-12
Attending: ORTHOPAEDIC SURGERY
Payer: MEDICARE

## 2024-11-12 VITALS
BODY MASS INDEX: 32.09 KG/M2 | HEART RATE: 69 BPM | DIASTOLIC BLOOD PRESSURE: 77 MMHG | SYSTOLIC BLOOD PRESSURE: 122 MMHG | HEIGHT: 66 IN

## 2024-11-12 DIAGNOSIS — Z47.1 AFTERCARE FOLLOWING RIGHT KNEE JOINT REPLACEMENT SURGERY: Primary | ICD-10-CM

## 2024-11-12 DIAGNOSIS — M17.11 PRIMARY OSTEOARTHRITIS OF RIGHT KNEE: ICD-10-CM

## 2024-11-12 DIAGNOSIS — Z47.1 AFTERCARE FOLLOWING RIGHT KNEE JOINT REPLACEMENT SURGERY: ICD-10-CM

## 2024-11-12 DIAGNOSIS — Z96.651 AFTERCARE FOLLOWING RIGHT KNEE JOINT REPLACEMENT SURGERY: ICD-10-CM

## 2024-11-12 DIAGNOSIS — M17.11 PRIMARY OSTEOARTHRITIS OF RIGHT KNEE: Primary | ICD-10-CM

## 2024-11-12 DIAGNOSIS — Z96.651 AFTERCARE FOLLOWING RIGHT KNEE JOINT REPLACEMENT SURGERY: Primary | ICD-10-CM

## 2024-11-12 PROCEDURE — 97110 THERAPEUTIC EXERCISES: CPT | Performed by: PHYSICAL THERAPIST

## 2024-11-12 PROCEDURE — 97140 MANUAL THERAPY 1/> REGIONS: CPT | Performed by: PHYSICAL THERAPIST

## 2024-11-12 PROCEDURE — 99024 POSTOP FOLLOW-UP VISIT: CPT | Performed by: ORTHOPAEDIC SURGERY

## 2024-11-12 PROCEDURE — 73560 X-RAY EXAM OF KNEE 1 OR 2: CPT

## 2024-11-12 RX ORDER — OXYCODONE HYDROCHLORIDE 5 MG/1
5 TABLET ORAL EVERY 6 HOURS PRN
Qty: 30 TABLET | Refills: 0 | Status: SHIPPED | OUTPATIENT
Start: 2024-11-12 | End: 2024-11-22

## 2024-11-12 RX ORDER — IBUPROFEN 800 MG/1
800 TABLET, FILM COATED ORAL EVERY 8 HOURS PRN
Qty: 30 TABLET | Refills: 0 | Status: SHIPPED | OUTPATIENT
Start: 2024-11-12

## 2024-11-12 NOTE — PROGRESS NOTES
Assessment:  1. Aftercare following right knee joint replacement surgery            Plan:  One weeks s/p right TKA with robot, 9/26/2024  The patient is doing well   She should continue daily Lovenox, pain medications as needed and current physical therapy regimen.    IBU 800mg TID prescribed  The patient can shower letting soapy water over incision, yet should not to submerge the incision, and then pat dry.    The patient should follow up in one week      To do next visit:  Return in about 1 week (around 11/19/2024).    The above stated was discussed in layman's terms and the patient expressed understanding.  All questions were answered to the patient's satisfaction.       Scribe Attestation      I,:  Benji Boyer MA am acting as a scribe while in the presence of the attending physician.:       I,:  Ignacio Leblanc MD personally performed the services described in this documentation    as scribed in my presence.:               Subjective:   Delia Morin is a 64 y.o. female who presents one weeks s/p right TKA with robot, 9/26/2024.  The patient is doing well.  Today she complains of generalized right knee pain.  She does participate in physical therapy.  She does use Lovenox daily.  She does use oxycodone, Tylenol, Gabapentin, and tizanidine for pain control.  She denies fever, chills or shortness of breath.        Review of systems negative unless otherwise specified in HPI    Past Medical History:   Diagnosis Date    Disease of thyroid gland     GERD (gastroesophageal reflux disease)     History of hepatitis B     Hyperlipidemia     Hypertension        Past Surgical History:   Procedure Laterality Date    ABCESS DRAINAGE      tonsil at age 3    COLONOSCOPY      JOINT REPLACEMENT      MO NAWAF BARRAGAN CONDYLE&PLATU MEDIAL&LAT COMPARTMENTS Left 02/28/2024    Procedure: ARTHROPLASTY KNEE TOTAL W ROBOT;  Surgeon: Ignacio Leblanc MD;  Location: [facility];  Service: Orthopedics    RAYA BARRAGAN  CONDYLE&PLATU MEDIAL&LAT COMPARTMENTS Right 11/4/2024    Procedure: ARTHROPLASTY KNEE TOTAL W ROBOT;  Surgeon: Ignacio Leblanc MD;  Location: [facility];  Service: Orthopedics       History reviewed. No pertinent family history.    Social History     Occupational History    Not on file   Tobacco Use    Smoking status: Never    Smokeless tobacco: Never   Vaping Use    Vaping status: Never Used   Substance and Sexual Activity    Alcohol use: Yes     Alcohol/week: 7.0 standard drinks of alcohol     Types: 7 Glasses of wine per week     Comment: social    Drug use: Never    Sexual activity: Yes     Partners: Male         Current Outpatient Medications:     acetaminophen (TYLENOL) 500 mg tablet, Take 2 tablets (1,000 mg total) by mouth every 6 (six) hours as needed for mild pain, Disp: 90 tablet, Rfl: 0    acetaminophen (TYLENOL) 650 mg CR tablet, Take 1 tablet (650 mg total) by mouth every 8 (eight) hours as needed for mild pain, Disp: 30 tablet, Rfl: 0    Calcium Carb-Cholecalciferol 600-400 MG-UNIT TABS, Take by mouth, Disp: , Rfl:     cephalexin (KEFLEX) 500 mg capsule, 4 tablets 1 hour before dental visit repeat is necessary, Disp: 40 capsule, Rfl: 0    cyanocobalamin (VITAMIN B-12) 1000 MCG tablet, Take 1,000 mcg by mouth in the morning, Disp: , Rfl:     enoxaparin (LOVENOX) 40 mg/0.4 mL, Inject 0.4 mL (40 mg total) under the skin daily for 28 days To start postoperatively, Disp: 11.2 mL, Rfl: 0    ezetimibe (ZETIA) 10 mg tablet, Take 1 tablet (10 mg total) by mouth in the morning, Disp: 90 tablet, Rfl: 3    gabapentin (Neurontin) 100 mg capsule, Take 1 capsule (100 mg total) by mouth 3 (three) times a day, Disp: 90 capsule, Rfl: 0    hydroCHLOROthiazide 25 mg tablet, TAKE 1 TABLET (25 MG TOTAL) BY MOUTH DAILY., Disp: 90 tablet, Rfl: 3    levothyroxine 75 mcg tablet, TAKE 1 TABLET BY MOUTH EVERY DAY, Disp: 90 tablet, Rfl: 1    metoprolol succinate (TOPROL-XL) 50 mg 24 hr tablet, Take 1 tablet (50 mg total)  by mouth daily, Disp: 30 tablet, Rfl: 0    Multiple Vitamins-Minerals (multivitamin with minerals) tablet, Take 1 tablet by mouth daily, Disp: 30 tablet, Rfl: 2    omeprazole (PriLOSEC) 40 MG capsule, TAKE 1 CAPSULE (40 MG TOTAL) BY MOUTH 2 (TWO) TIMES A DAY FOR 30 DAYS, THEN 1 CAPSULE (40 MG TOTAL) DAILY., Disp: 90 capsule, Rfl: 1    oxyCODONE (Roxicodone) 5 immediate release tablet, Take 1 tablet (5 mg total) by mouth every 6 (six) hours as needed for moderate pain for up to 10 days Max Daily Amount: 20 mg, Disp: 30 tablet, Rfl: 0    PARoxetine (PAXIL) 10 mg tablet, Take 1 tablet (10 mg total) by mouth daily, Disp: 90 tablet, Rfl: 1    tiZANidine (ZANAFLEX) 4 mg tablet, Take 1 tablet (4 mg total) by mouth every 8 (eight) hours as needed for muscle spasms, Disp: 60 tablet, Rfl: 0    triamcinolone (KENALOG) 0.1 % cream, As needed, Disp: , Rfl:     valsartan (DIOVAN) 160 mg tablet, TAKE 1 TABLET BY MOUTH EVERY DAY, Disp: 90 tablet, Rfl: 1    Allergies   Allergen Reactions    Amlodipine Swelling and Other (See Comments)     Ankle swelling      Atorvastatin Cough and Myalgia     Cough    Penicillins Rash    Sulfa Antibiotics Rash            Vitals:    11/12/24 1029   BP: 122/77   Pulse: 69       Objective:  Physical exam  General: Awake, Alert, Oriented  Eyes: Pupils equal, round and reactive to light  Heart: regular rate and rhythm  Lungs: No audible wheezing  Abdomen: soft                    Ortho Exam  Right knee:  Incision clean dry and intact  Staples well approximated   No erythema and no ecchymosis  Appropriate swelling of lower limb  Appropriate warmth of knee  Extensor mechanism intact  Extension 10  Flexion 90+  Calf compartments soft and supple  Sensation intact  Toes are warm sensate and mobile      Diagnostics, reviewed and taken today if performed as documented:    The attending physician has personally reviewed the pertinent films in PACS and interpretation is as follows:  Right knee  x-ray:  Well  "aligned prosthesis with no acute changes.      Procedures, if performed today:    Procedures    None performed      Portions of the record may have been created with voice recognition software.  Occasional wrong word or \"sound a like\" substitutions may have occurred due to the inherent limitations of voice recognition software.  Read the chart carefully and recognize, using context, where substitutions have occurred.    "

## 2024-11-12 NOTE — PROGRESS NOTES
"Daily Note     Today's date: 2024  Patient name: Delia Morin  : 1960  MRN: [id number]  Referring provider: Jb Viramontes*  Dx:   Encounter Diagnosis     ICD-10-CM    1. Primary osteoarthritis of right knee  M17.11           Start Time: 1310  Stop Time: 1400  Total time in clinic (min): 50 minutes    Subjective: Patient reports moderate pain today. Patient reports feeling out of breath after walking, it is tiring to the point where it is difficult to do HEP. Patient has talked to doctor and was prescribed new medications to try, picking them up today.      Objective: See treatment diary below      Assessment: Patient tolerated treatment fairly today. Pain elicited with knee flexion and extension. Able to perform SLR with moderate quad lag. Extreme pain when cold pack was placed in extension, bolster was placed behind knee to make it more tolerable; patient asked to stop at 5 minutes.      Plan: Continue per plan of care.  Patient was instructed to replace sidelying hip abductions with clamshells to reduce stress on knee. Patient was instructed to reduce time in recliner and rest/ice knee in extension as much as possible at home.     Precautions: n/a      Manuals            PROM  mr                                                  Neuro Re-Ed                                                                                                        Ther Ex             Sidelying hip abd 2x10 3x10           Clamshells  1x10           SAQ  5\"x20           SLR 1x10 x20           Gastroc stretch 4x30\" 4x30\"           Hamstring stretch 4x30\" 4x30\"           Heel slides 5\"x10 5\"x20           Bike  Not full rotation 5'                                     Ther Activity                                       Gait Training                                       Modalities             CP in ext  5'                             "

## 2024-11-15 ENCOUNTER — OFFICE VISIT (OUTPATIENT)
Dept: PHYSICAL THERAPY | Facility: CLINIC | Age: 64
End: 2024-11-15
Payer: MEDICARE

## 2024-11-15 DIAGNOSIS — M17.11 PRIMARY OSTEOARTHRITIS OF RIGHT KNEE: Primary | ICD-10-CM

## 2024-11-15 PROCEDURE — 97110 THERAPEUTIC EXERCISES: CPT | Performed by: PHYSICAL THERAPIST

## 2024-11-15 PROCEDURE — 97140 MANUAL THERAPY 1/> REGIONS: CPT | Performed by: PHYSICAL THERAPIST

## 2024-11-15 NOTE — PROGRESS NOTES
"Daily Note     Today's date: 11/15/2024  Patient name: Delia Morin  : 1960  MRN: [id number]  Referring provider: Jb Viramontes*  Dx:   Encounter Diagnosis     ICD-10-CM    1. Primary osteoarthritis of right knee  M17.11           Start Time: 1300  Stop Time: 1345  Total time in clinic (min): 45 minutes    Subjective: Patient reports pain is much more manageable today. The new pain medications have the patient \"feeling like a new person.\" Patient reports most pain on lateral side of R knee, mostly when standing up. Patient feels better walking, feeling more steady on feet. Patient reports       Objective: See treatment diary below      Assessment: Patient tolerated treatment well. Passive flexion is coming along well, extension is still limited and causes pain for the patient when overpressure is applied. Patient is able to perform more repetitions of quad sets and SLRs demonstrating increases in strength. SLR are performed with moderate quad lag, main limiter seems to be range of motion. Patient reported appropriate levels of muscle fatigue with clamshells. Cold pack in extension was still very painful for patient, needed to take flexion breaks throughout icing.    Treatment was performed by Jose Mendez, SPT, directly supervised by Ced Espinosa PT.    Plan: Continue per plan of care.       Precautions: n/a      Manuals 11/8 11/12 11/15          PROM  mr kl                                                 Neuro Re-Ed                                                                                                        Ther Ex             Sidelying hip abd 2x10 3x10           Clamshells  1x10 5\"x20          SAQ  5\"x20 5\"x25          SLR 1x10 x20 x30          Gastroc stretch 4x30\" 4x30\" 15\"x4 strap with leg elevated          Hamstring stretch 4x30\" 4x30\" 4x30\"          Heel slides 5\"x10 5\"x20 5\"x20          Cone taps   x20 ea          Bike  Not full rotation 5' 5'                              "       Ther Activity                                       Gait Training                                       Modalities             CP in ext  5' 10'

## 2024-11-18 ENCOUNTER — OFFICE VISIT (OUTPATIENT)
Dept: PHYSICAL THERAPY | Facility: CLINIC | Age: 64
End: 2024-11-18
Payer: MEDICARE

## 2024-11-18 DIAGNOSIS — M17.11 PRIMARY OSTEOARTHRITIS OF RIGHT KNEE: Primary | ICD-10-CM

## 2024-11-18 PROCEDURE — 97140 MANUAL THERAPY 1/> REGIONS: CPT

## 2024-11-18 PROCEDURE — 97110 THERAPEUTIC EXERCISES: CPT

## 2024-11-18 NOTE — PROGRESS NOTES
"Daily Note     Today's date: 2024  Patient name: Delia Morin  : 1960  MRN: [id number]  Referring provider: Jb Viramontes*  Dx:   Encounter Diagnosis     ICD-10-CM    1. Primary osteoarthritis of right knee  M17.11           Start Time: 1215  Stop Time: 1258  Total time in clinic (min): 43 minutes      Subjective: Patient states, \"I can get out of bed and I'm stable.\"  Patient reports right knee pain is getting better however she is anxious to get the staples removed.      Objective: See treatment diary below.      Assessment: Progression of TE program is tolerated well.  Right knee extension stretching is painful.      Plan: Continue treatment as per PT plan of care.       Precautions: n/a      Manuals 11/8 11/12 11/15 11/18         PROM  mr kl JLW                                                Neuro Re-Ed                                                                                                        Ther Ex             Sidelying hip abd 2x10 3x10           Clamshells  1x10 5\"x20 supine  blue  5\"x20         SAQ  5\"x20 5\"x25 5\"x20         SLR 1x10 x20 x30 2x10         Gastroc stretch 4x30\" 4x30\" 15\"x4 strap with leg elevated          Hamstring stretch 4x30\" 4x30\" 4x30\" 4x30\"         Heel slides 5\"x10 5\"x20 5\"x20 5\"x20         Cone taps   x20 ea 20 ea         Bike  Not full rotation 5' 5' 5'         Step ups    4\"  20         Sit to stand    2 airex on chair  20                                   Ther Activity                                       Gait Training                                       Modalities             CP in ext  5' 10' 5'                             "

## 2024-11-19 ENCOUNTER — OFFICE VISIT (OUTPATIENT)
Dept: OBGYN CLINIC | Facility: HOSPITAL | Age: 64
End: 2024-11-19

## 2024-11-19 VITALS
DIASTOLIC BLOOD PRESSURE: 98 MMHG | HEIGHT: 66 IN | SYSTOLIC BLOOD PRESSURE: 156 MMHG | BODY MASS INDEX: 32.09 KG/M2 | HEART RATE: 91 BPM

## 2024-11-19 DIAGNOSIS — M17.11 PRIMARY OSTEOARTHRITIS OF RIGHT KNEE: ICD-10-CM

## 2024-11-19 DIAGNOSIS — Z96.651 AFTERCARE FOLLOWING RIGHT KNEE JOINT REPLACEMENT SURGERY: Primary | ICD-10-CM

## 2024-11-19 DIAGNOSIS — Z47.1 AFTERCARE FOLLOWING RIGHT KNEE JOINT REPLACEMENT SURGERY: Primary | ICD-10-CM

## 2024-11-19 PROCEDURE — 99024 POSTOP FOLLOW-UP VISIT: CPT | Performed by: ORTHOPAEDIC SURGERY

## 2024-11-19 NOTE — PROGRESS NOTES
Assessment:   Diagnosis ICD-10-CM Associated Orders   1. Aftercare following right knee joint replacement surgery  Z47.1 Ambulatory referral to Physical Therapy    Z96.651       2. Primary osteoarthritis of right knee  M17.11             Plan:  64 y.o. female 2 weeks s/p R TKA doing well postoperatively  Continue physical therapy   Continue weight bearing activities as tolerated   Continue pain medications as needed   Staples removed in office today, incision cleaned, steri-strips applied   Follow up in 4 weeks for 6 week post-op appointment     The above stated was discussed in layman's terms and the patient expressed understanding.  All questions were answered to the patient's satisfaction.     To do next visit:  Return in about 4 weeks (around 12/17/2024).      Subjective:   Delia Morin is a 64 y.o. female who presents 2 weeks s/p R TKA doing well postoperatively. She states her pain has been greatly improving, and her range of motion has been improving with physical therapy. Offers no new complaints today, no fever or chills.    Review of systems negative unless otherwise specified in HPI    Past Medical History:   Diagnosis Date    Disease of thyroid gland     GERD (gastroesophageal reflux disease)     History of hepatitis B     Hyperlipidemia     Hypertension        Past Surgical History:   Procedure Laterality Date    ABCESS DRAINAGE      tonsil at age 3    COLONOSCOPY      JOINT REPLACEMENT      LA ARTHRP KNE CONDYLE&PLATU MEDIAL&LAT COMPARTMENTS Left 02/28/2024    Procedure: ARTHROPLASTY KNEE TOTAL W ROBOT;  Surgeon: Ignacio Leblanc MD;  Location:  MAIN OR;  Service: Orthopedics    LA ARTHRP KNE CONDYLE&PLATU MEDIAL&LAT COMPARTMENTS Right 11/4/2024    Procedure: ARTHROPLASTY KNEE TOTAL W ROBOT;  Surgeon: Ignacio Leblanc MD;  Location:  MAIN OR;  Service: Orthopedics       No family history on file.    Social History     Occupational History    Not on file   Tobacco Use    Smoking  status: Never    Smokeless tobacco: Never   Vaping Use    Vaping status: Never Used   Substance and Sexual Activity    Alcohol use: Yes     Alcohol/week: 7.0 standard drinks of alcohol     Types: 7 Glasses of wine per week     Comment: social    Drug use: Never    Sexual activity: Yes     Partners: Male         Current Outpatient Medications:     acetaminophen (TYLENOL) 500 mg tablet, Take 2 tablets (1,000 mg total) by mouth every 6 (six) hours as needed for mild pain, Disp: 90 tablet, Rfl: 0    acetaminophen (TYLENOL) 650 mg CR tablet, Take 1 tablet (650 mg total) by mouth every 8 (eight) hours as needed for mild pain, Disp: 30 tablet, Rfl: 0    Calcium Carb-Cholecalciferol 600-400 MG-UNIT TABS, Take by mouth, Disp: , Rfl:     cephalexin (KEFLEX) 500 mg capsule, 4 tablets 1 hour before dental visit repeat is necessary, Disp: 40 capsule, Rfl: 0    cyanocobalamin (VITAMIN B-12) 1000 MCG tablet, Take 1,000 mcg by mouth in the morning, Disp: , Rfl:     enoxaparin (LOVENOX) 40 mg/0.4 mL, Inject 0.4 mL (40 mg total) under the skin daily for 28 days To start postoperatively, Disp: 11.2 mL, Rfl: 0    ezetimibe (ZETIA) 10 mg tablet, Take 1 tablet (10 mg total) by mouth in the morning, Disp: 90 tablet, Rfl: 3    gabapentin (Neurontin) 100 mg capsule, Take 1 capsule (100 mg total) by mouth 3 (three) times a day, Disp: 90 capsule, Rfl: 0    hydroCHLOROthiazide 25 mg tablet, TAKE 1 TABLET (25 MG TOTAL) BY MOUTH DAILY., Disp: 90 tablet, Rfl: 3    ibuprofen (MOTRIN) 800 mg tablet, Take 1 tablet (800 mg total) by mouth every 8 (eight) hours as needed for mild pain, Disp: 30 tablet, Rfl: 0    levothyroxine 75 mcg tablet, TAKE 1 TABLET BY MOUTH EVERY DAY, Disp: 90 tablet, Rfl: 1    metoprolol succinate (TOPROL-XL) 50 mg 24 hr tablet, Take 1 tablet (50 mg total) by mouth daily, Disp: 30 tablet, Rfl: 0    Multiple Vitamins-Minerals (multivitamin with minerals) tablet, Take 1 tablet by mouth daily, Disp: 30 tablet, Rfl: 2    omeprazole  "(PriLOSEC) 40 MG capsule, TAKE 1 CAPSULE (40 MG TOTAL) BY MOUTH 2 (TWO) TIMES A DAY FOR 30 DAYS, THEN 1 CAPSULE (40 MG TOTAL) DAILY., Disp: 90 capsule, Rfl: 1    oxyCODONE (Roxicodone) 5 immediate release tablet, Take 1 tablet (5 mg total) by mouth every 6 (six) hours as needed for moderate pain for up to 10 days Max Daily Amount: 20 mg, Disp: 30 tablet, Rfl: 0    PARoxetine (PAXIL) 10 mg tablet, Take 1 tablet (10 mg total) by mouth daily, Disp: 90 tablet, Rfl: 1    tiZANidine (ZANAFLEX) 4 mg tablet, Take 1 tablet (4 mg total) by mouth every 8 (eight) hours as needed for muscle spasms, Disp: 60 tablet, Rfl: 0    triamcinolone (KENALOG) 0.1 % cream, As needed, Disp: , Rfl:     valsartan (DIOVAN) 160 mg tablet, TAKE 1 TABLET BY MOUTH EVERY DAY, Disp: 90 tablet, Rfl: 1    Allergies   Allergen Reactions    Amlodipine Swelling and Other (See Comments)     Ankle swelling      Atorvastatin Cough and Myalgia     Cough    Penicillins Rash    Sulfa Antibiotics Rash            Vitals:    11/19/24 1027   BP: 156/98   Pulse: 91       Objective:  Physical exam  General: Awake, Alert, Oriented  Eyes: Pupils equal, round and reactive to light  Heart: regular rate and rhythm  Lungs: No audible wheezing  Abdomen: soft                    Ortho Exam    Incision clean dry and intact  Staples well approximated; removed in office today  Incision cleaned, steri-strips applied    No erythema    mild ecchymosis of leg  Appropriate swelling of lower limb  Appropriate warmth of knee  Extensor mechanism intact  Extension 10  Flexion 105  Calf compartments soft and supple  Sensation intact  Toes are warm sensate and mobile       Diagnostics, reviewed and taken today if performed as documented:    None performed      Procedures, if performed today:    Procedures    None performed    Portions of the record may have been created with voice recognition software.  Occasional wrong word or \"sound a like\" substitutions may have occurred due to the " inherent limitations of voice recognition software.  Read the chart carefully and recognize, using context, where substitutions have occurred.

## 2024-11-20 ENCOUNTER — EVALUATION (OUTPATIENT)
Dept: PHYSICAL THERAPY | Facility: CLINIC | Age: 64
End: 2024-11-20
Payer: MEDICARE

## 2024-11-20 DIAGNOSIS — Z96.651 AFTERCARE FOLLOWING RIGHT KNEE JOINT REPLACEMENT SURGERY: ICD-10-CM

## 2024-11-20 DIAGNOSIS — M17.11 PRIMARY OSTEOARTHRITIS OF RIGHT KNEE: Primary | ICD-10-CM

## 2024-11-20 DIAGNOSIS — Z47.1 AFTERCARE FOLLOWING RIGHT KNEE JOINT REPLACEMENT SURGERY: ICD-10-CM

## 2024-11-20 PROCEDURE — 97110 THERAPEUTIC EXERCISES: CPT | Performed by: PHYSICAL THERAPIST

## 2024-11-20 PROCEDURE — 97140 MANUAL THERAPY 1/> REGIONS: CPT | Performed by: PHYSICAL THERAPIST

## 2024-11-20 NOTE — LETTER
2024    King Felipelli    Patient: Delia Morin   YOB: 1960   Date of Visit: 2024     Encounter Diagnosis     ICD-10-CM    1. Primary osteoarthritis of right knee  M17.11           Dear Dr. Pascal:    Thank you for your recent referral of Delia Morin. Please review the attached evaluation summary from Delia's recent visit.     Please verify that you agree with the plan of care by signing the attached order.     If you have any questions or concerns, please do not hesitate to call.     I sincerely appreciate the opportunity to share in the care of one of your patients and hope to have another opportunity to work with you in the near future.       Sincerely,    Vik Espinosa, PT      Referring Provider:      I certify that I have read the below Plan of Care and certify the need for these services furnished under this plan of treatment while under my care.                    King Pascal  Via Fax: [phone]          PT Evaluation     Today's date: 2024  Patient name: Delia Morin  : 1960  MRN: [id number]  Referring provider: Jb Viramontes*  Dx:   Encounter Diagnosis     ICD-10-CM    1. Primary osteoarthritis of right knee  M17.11             Start Time: 1215  Stop Time: 1300  Total time in clinic (min): 45 minutes    Assessment/Plan    Patient presents to PT post-R TKA being treated to restore ROM, strength and reduce pain. Patient has been making good progress in achieving goals, demonstrating improved strength and ROM compared to initial evaluation. Pain and swelling has also been reduced since first post-op visit. Patient will continue PT at a different clinic due to changing location to continue progress towards goals.    Patient will be D/C from this site following today's session.    Evaluation was performed by VINH Pal supervised by Ced Espinosa PT.    LTG:  Patient will make full return to golfing 3x/week without  "limitation by symptoms. ongoing  Patient will be able to walk community distances without an assistive device. ongoing  Patient will be able to return to personal training sessions 2-3x/week without limitation by symptoms. ongoing    Subjective    Patient reports no pain at rest. Some discomfort/stiffness when walking or moving her knee. She is walking independently indoors and outdoors without any AD, has no issues navigating her house. She is not walking community distances but able to get where she needs to go running errands. She is still icing and taking pain medications/muscle relaxants, but is increasing the time in between dosages; taking Oxy only before going to bed to help sleep. Patient feels much better now that stitches are out.    NPRS: C: 0, B: 0, W: 4 (with stretching/overpressure)      Objective    Palpation/Observation:  Mild swelling, warm around R knee. Incision is clean, dry, intact; good incision mobility    ROM  Extension-Flexion: -  Firm/painful end-feel with flexion & extension  Adequate patellar mobility, mild tenderness    Strength  Good quad set  Improved ability to perform        Precautions: n/a    Manuals 11/8 11/12 11/15 11/18  11/20             PROM   mr kl JLW  mr                                                                                     Neuro Re-Ed                                                                                                                                                                                               Ther Ex                       Sidelying hip abd 2x10 3x10                   Clamshells   1x10 5\"x20 supine  blue  5\"x20  sidelying 5\"x20 ea             SAQ   5\"x20 5\"x25 5\"x20  5\"x20             SLR 1x10 x20 x30 2x10  x30             Gastroc stretch 4x30\" 4x30\" 15\"x4 strap with leg elevated                 Hamstring stretch 4x30\" 4x30\" 4x30\" 4x30\"  4x30\"             Heel slides 5\"x10 5\"x20 5\"x20 5\"x20  5\"x20             Cone taps     " "x20 ea 20 ea  30 ea             Bike   Not full rotation 5' 5' 5'  6'             Step ups       4\"  20  4\" 30             Sit to stand       2 airex on chair  20  2 airex 20                                                             Ther Activity                                                                       Gait Training                                                                       Modalities                       CP in ext   5' 10' 5'                                                            "

## 2024-11-20 NOTE — PROGRESS NOTES
PT Evaluation     Today's date: 2024  Patient name: Delia Morin  : 1960  MRN: [id number]  Referring provider: Jb Viramontes*  Dx:   Encounter Diagnosis     ICD-10-CM    1. Primary osteoarthritis of right knee  M17.11             Start Time: 1215  Stop Time: 1300  Total time in clinic (min): 45 minutes    Assessment/Plan    Patient presents to PT post-R TKA being treated to restore ROM, strength and reduce pain. Patient has been making good progress in achieving goals, demonstrating improved strength and ROM compared to initial evaluation. Pain and swelling has also been reduced since first post-op visit. Patient will continue PT at a different clinic due to changing location to continue progress towards goals.    Patient will be D/C from this site following today's session.    Evaluation was performed by VINH Pal supervised by Ced Espinosa PT.    LTG:  Patient will make full return to golfing 3x/week without limitation by symptoms. ongoing  Patient will be able to walk community distances without an assistive device. ongoing  Patient will be able to return to personal training sessions 2-3x/week without limitation by symptoms. ongoing    Subjective    Patient reports no pain at rest. Some discomfort/stiffness when walking or moving her knee. She is walking independently indoors and outdoors without any AD, has no issues navigating her house. She is not walking community distances but able to get where she needs to go running errands. She is still icing and taking pain medications/muscle relaxants, but is increasing the time in between dosages; taking Oxy only before going to bed to help sleep. Patient feels much better now that stitches are out.    NPRS: C: 0, B: 0, W: 4 (with stretching/overpressure)      Objective    Palpation/Observation:  Mild swelling, warm around R knee. Incision is clean, dry, intact; good incision mobility    ROM  Extension-Flexion:  "-  Firm/painful end-feel with flexion & extension  Adequate patellar mobility, mild tenderness    Strength  Good quad set  Improved ability to perform        Precautions: n/a    Manuals 11/8 11/12 11/15 11/18  11/20             PROM   mr kl JLW  mr                                                                                     Neuro Re-Ed                                                                                                                                                                                               Ther Ex                       Sidelying hip abd 2x10 3x10                   Clamshells   1x10 5\"x20 supine  blue  5\"x20  sidelying 5\"x20 ea             SAQ   5\"x20 5\"x25 5\"x20  5\"x20             SLR 1x10 x20 x30 2x10  x30             Gastroc stretch 4x30\" 4x30\" 15\"x4 strap with leg elevated                 Hamstring stretch 4x30\" 4x30\" 4x30\" 4x30\"  4x30\"             Heel slides 5\"x10 5\"x20 5\"x20 5\"x20  5\"x20             Cone taps     x20 ea 20 ea  30 ea             Bike   Not full rotation 5' 5' 5'  6'             Step ups       4\"  20  4\" 30             Sit to stand       2 airex on chair  20  2 airex 20                                                             Ther Activity                                                                       Gait Training                                                                       Modalities                       CP in ext   5' 10' 5'                                            "

## 2024-12-17 ENCOUNTER — OFFICE VISIT (OUTPATIENT)
Dept: OBGYN CLINIC | Facility: HOSPITAL | Age: 64
End: 2024-12-17

## 2024-12-17 VITALS
SYSTOLIC BLOOD PRESSURE: 140 MMHG | HEIGHT: 66 IN | WEIGHT: 194 LBS | DIASTOLIC BLOOD PRESSURE: 87 MMHG | BODY MASS INDEX: 31.18 KG/M2 | HEART RATE: 77 BPM

## 2024-12-17 DIAGNOSIS — Z47.1 AFTERCARE FOLLOWING RIGHT KNEE JOINT REPLACEMENT SURGERY: Primary | ICD-10-CM

## 2024-12-17 DIAGNOSIS — Z96.651 AFTERCARE FOLLOWING RIGHT KNEE JOINT REPLACEMENT SURGERY: Primary | ICD-10-CM

## 2024-12-17 PROCEDURE — 99024 POSTOP FOLLOW-UP VISIT: CPT | Performed by: ORTHOPAEDIC SURGERY

## 2024-12-17 NOTE — PROGRESS NOTES
Assessment:  1. Aftercare following right knee joint replacement surgery            Plan:  6 weeks s/p right TKA with robot, 11/4/2024.   She is doing well.    She should continue physical therapy.    She should follow up in 3 months  [place] 4[place]20[place]      To do next visit:  Return in about 3 months (around 3/17/2025).    The above stated was discussed in layman's terms and the patient expressed understanding.  All questions were answered to the patient's satisfaction.       Scribe Attestation      I,:  Bejni Boyer MA am acting as a scribe while in the presence of the attending physician.:       I,:  Ignacio Leblanc MD personally performed the services described in this documentation    as scribed in my presence.:               Subjective:   Delia Morin is a 64 y.o. female who presents 6 weeks s/p right TKA with robot, 11/4/2024.  She is doing well.  Today she has minimal right knee soreness.  She continues with physical therapy with benefit.  She denies medications for this issue.  He denies fever, chills or shortness of breath.        Review of systems negative unless otherwise specified in HPI    Past Medical History:   Diagnosis Date    Disease of thyroid gland     GERD (gastroesophageal reflux disease)     History of hepatitis B     Hyperlipidemia     Hypertension        Past Surgical History:   Procedure Laterality Date    ABCESS DRAINAGE      tonsil at age 3    COLONOSCOPY      JOINT REPLACEMENT      MT ARTHRP KNE CONDYLE&PLATU MEDIAL&LAT COMPARTMENTS Left 02/28/2024    Procedure: ARTHROPLASTY KNEE TOTAL W ROBOT;  Surgeon: Ignacio Leblanc MD;  Location: [facility];  Service: Orthopedics    MT ARTHRP KNE CONDYLE&PLATU MEDIAL&LAT COMPARTMENTS Right 11/4/2024    Procedure: ARTHROPLASTY KNEE TOTAL W ROBOT;  Surgeon: Ignacio Leblanc MD;  Location:  [facility] OR;  Service: Orthopedics       No family history on file.    Social History     Occupational  History    Not on file   Tobacco Use    Smoking status: Never    Smokeless tobacco: Never   Vaping Use    Vaping status: Never Used   Substance and Sexual Activity    Alcohol use: Yes     Alcohol/week: 7.0 standard drinks of alcohol     Types: 7 Glasses of wine per week     Comment: social    Drug use: Never    Sexual activity: Yes     Partners: Male         Current Outpatient Medications:     acetaminophen (TYLENOL) 500 mg tablet, Take 2 tablets (1,000 mg total) by mouth every 6 (six) hours as needed for mild pain, Disp: 90 tablet, Rfl: 0    acetaminophen (TYLENOL) 650 mg CR tablet, Take 1 tablet (650 mg total) by mouth every 8 (eight) hours as needed for mild pain, Disp: 30 tablet, Rfl: 0    Calcium Carb-Cholecalciferol 600-400 MG-UNIT TABS, Take by mouth, Disp: , Rfl:     cephalexin (KEFLEX) 500 mg capsule, 4 tablets 1 hour before dental visit repeat is necessary, Disp: 40 capsule, Rfl: 0    cyanocobalamin (VITAMIN B-12) 1000 MCG tablet, Take 1,000 mcg by mouth in the morning, Disp: , Rfl:     enoxaparin (LOVENOX) 40 mg/0.4 mL, Inject 0.4 mL (40 mg total) under the skin daily for 28 days To start postoperatively, Disp: 11.2 mL, Rfl: 0    ezetimibe (ZETIA) 10 mg tablet, Take 1 tablet (10 mg total) by mouth in the morning, Disp: 90 tablet, Rfl: 3    gabapentin (Neurontin) 100 mg capsule, Take 1 capsule (100 mg total) by mouth 3 (three) times a day, Disp: 90 capsule, Rfl: 0    hydroCHLOROthiazide 25 mg tablet, TAKE 1 TABLET (25 MG TOTAL) BY MOUTH DAILY., Disp: 90 tablet, Rfl: 3    ibuprofen (MOTRIN) 800 mg tablet, Take 1 tablet (800 mg total) by mouth every 8 (eight) hours as needed for mild pain, Disp: 30 tablet, Rfl: 0    levothyroxine 75 mcg tablet, TAKE 1 TABLET BY MOUTH EVERY DAY, Disp: 90 tablet, Rfl: 1    metoprolol succinate (TOPROL-XL) 50 mg 24 hr tablet, Take 1 tablet (50 mg total) by mouth daily, Disp: 30 tablet, Rfl: 0    Multiple Vitamins-Minerals (multivitamin with minerals) tablet, Take 1 tablet by  "mouth daily, Disp: 30 tablet, Rfl: 2    omeprazole (PriLOSEC) 40 MG capsule, TAKE 1 CAPSULE (40 MG TOTAL) BY MOUTH 2 (TWO) TIMES A DAY FOR 30 DAYS, THEN 1 CAPSULE (40 MG TOTAL) DAILY., Disp: 90 capsule, Rfl: 1    PARoxetine (PAXIL) 10 mg tablet, Take 1 tablet (10 mg total) by mouth daily, Disp: 90 tablet, Rfl: 1    tiZANidine (ZANAFLEX) 4 mg tablet, Take 1 tablet (4 mg total) by mouth every 8 (eight) hours as needed for muscle spasms, Disp: 60 tablet, Rfl: 0    triamcinolone (KENALOG) 0.1 % cream, As needed, Disp: , Rfl:     valsartan (DIOVAN) 160 mg tablet, TAKE 1 TABLET BY MOUTH EVERY DAY, Disp: 90 tablet, Rfl: 1    Allergies   Allergen Reactions    Amlodipine Swelling and Other (See Comments)     Ankle swelling      Atorvastatin Cough and Myalgia     Cough    Penicillins Rash    Sulfa Antibiotics Rash            Vitals:    12/17/24 1328   BP: 140/87   Pulse: 77       Objective:  Physical exam  General: Awake, Alert, Oriented  Eyes: Pupils equal, round and reactive to light  Heart: regular rate and rhythm  Lungs: No audible wheezing  Abdomen: soft                    Ortho Exam  Right knee:  Well healed anterior incision  No erythema and no ecchymosis  Stable to varus and valgus stress  Extensor mechanism intact  Extension 3  Flexion 115  Calf compartments soft and supple  Sensation intact  Toes are warm sensate and mobile      Diagnostics, reviewed and taken today if performed as documented:    None performed     Procedures, if performed today:    Procedures    None performed      Portions of the record may have been created with voice recognition software.  Occasional wrong word or \"sound a like\" substitutions may have occurred due to the inherent limitations of voice recognition software.  Read the chart carefully and recognize, using context, where substitutions have occurred.    "

## 2024-12-17 NOTE — PROGRESS NOTES
[facility] INTERNAL MEDICINE- [place]  OFFICE VISIT     PATIENT INFORMATION     Delia Morin   64 y.o. female   MRN: [id number]    ASSESSMENT/PLAN     Assessment & Plan  SVT (supraventricular tachycardia) (HCC)  On 11/29/2024 patient contacted her cardiologist office stating that she had an SVT episode on Wednesday, November 27.    She was evaluated at [facility] emergency department. Patient states she was treated with IV medication that I presume to be adenosine.   This marks 2 emergency department trips 1 at [facility] and 1 in [place].  In July patient believes she had another episode but was able to abort the SVT herself at home by using vagal maneuvers and cold water.   Patient's cardiologist suggested electrophysiologist evaluation to consider ablation.   Discussed recent episodes with patient.  Most recent episode she was traveling to [place] and was not taking her medications for 2 days.  Noncompliance with metoprolol may have precipitated SVT.  I explained to patient the importance of remaining compliant with medications and she understands.  Continue metoprolol 50 mg daily.  Agreed with recommendation to see electrophysiologist.  Patient has contact information for electrophysiologist and will call and schedule appointment with them.  Heart regular rate and rhythm and pulse normal on today's exam.       Primary osteoarthritis of right knee  Patient was seen by myself in office on 10/11/2024 in consultation for preop appointment before right total knee arthroplasty.    Patient ultimately underwent right total knee arthroplasty as scheduled on 11/04/2024.    She has been participating in physical therapy and has been seen by orthopedics doctors in follow-up.    Patient was most recently seen by orthopedics on 11/19/2024 which was 2 weeks status post right total knee arthroplasty and she was doing well at that time.  She was instructed to follow-up 4 to 6 weeks for additional postop  appointment and was seen in their office again on 12/17/2024.   She reports that she is doing well today has been compliant with physical therapy and pain is controlled.  No issues at this time.       Primary hypertension  Stable.  Blood pressure acceptable in office today at 130/70.  Continue hydrochlorothiazide milligrams daily, metoprolol succinate 50 mg daily, valsartan 160 mg daily.            HEALTH MAINTENANCE     Immunization History   Administered Date(s) Administered    COVID-19 PFIZER VACCINE 0.3 ML IM 03/07/2021, 03/27/2021, 11/29/2021    COVID-19 Pfizer Vac BIVALENT Gio-sucrose 12 Yr+ IM 01/11/2023    INFLUENZA 11/11/2020, 11/15/2021, 10/11/2022    Influenza Split High Dose Preservative Free IM 11/05/2024    Tdap 08/22/2020    Zoster Vaccine Recombinant 01/14/2021, 12/08/2021         CHIEF COMPLAINT     Chief Complaint   Patient presents with    Follow-up      HISTORY OF PRESENT ILLNESS     Ms. Delia Morin is a 64-year-old female with a past medical history of SVT, hypertension, hepatic steatosis, GERD, hypothyroidism, depression, hyperlipidemia, primary osteoarthritis of both knees with previous left total knee arthroplasty.  Patient was seen by myself in office on 10/11/2024 in consultation for preop appointment before right total knee arthroplasty.  Patient ultimately underwent right total knee arthroplasty as scheduled on 11/04/2024.  She has been participating in physical therapy and has been seen by orthopedics doctors in follow-up.  Patient was most recently seen by orthopedics on 11/19/2024 which was 2 weeks status post right total knee arthroplasty and she was doing well at that time.  She was instructed to follow-up 4 to 6 weeks for additional postop appointment and was seen in their office again on 12/17/2024.     On 11/29/2024 patient contacted her cardiologist office stating that she had an SVT episode on Wednesday, November 27.  She was evaluated at [facility]  "emergency department. Patient states she was treated with IV medication that I presume to be adenosine. This marks 2 emergency department trips 1 at [facility] and 1 in [place].  In July patient believes she had another episode but was able to abort the SVT herself at home by using vagal maneuvers and cold water. Patient's cardiologist suggested electrophysiologist evaluation to consider ablation.     REVIEW OF SYSTEMS     Review of Systems   Constitutional:  Negative for chills and fever.   HENT:  Negative for congestion, rhinorrhea and sore throat.    Respiratory:  Negative for cough, shortness of breath and wheezing.    Cardiovascular:  Negative for chest pain and leg swelling.   Gastrointestinal:  Negative for abdominal distention, abdominal pain, constipation, diarrhea, nausea and vomiting.   Genitourinary:  Negative for difficulty urinating and dysuria.   Musculoskeletal:  Negative for arthralgias and back pain.   Skin:  Negative for rash and wound.   Neurological:  Negative for dizziness, syncope, weakness, light-headedness and headaches.   Psychiatric/Behavioral:  Negative for agitation, behavioral problems and confusion.      OBJECTIVE     Vitals:    12/18/24 1100   BP: 130/70   BP Location: Left arm   Patient Position: Sitting   Pulse: 80   Temp: 97.6 °F (36.4 °C)   TempSrc: Tympanic   SpO2: 93%   Weight: 87.9 kg (193 lb 12.8 oz)   Height: 5' 6\" (1.676 m)     Physical Exam  Constitutional:       Appearance: Normal appearance.   HENT:      Right Ear: External ear normal.      Left Ear: External ear normal.   Cardiovascular:      Rate and Rhythm: Normal rate and regular rhythm.      Pulses: Normal pulses.      Heart sounds: Normal heart sounds. No murmur heard.  Pulmonary:      Effort: Pulmonary effort is normal. No respiratory distress.      Breath sounds: Normal breath sounds. No wheezing, rhonchi or rales.   Abdominal:      General: Abdomen is flat. Bowel sounds are normal. There is no distension.      " Palpations: Abdomen is soft.      Tenderness: There is no abdominal tenderness.   Musculoskeletal:      Right lower leg: No edema.      Left lower leg: No edema.   Skin:     General: Skin is warm and dry.   Neurological:      Mental Status: She is alert and oriented to person, place, and time.   Psychiatric:         Mood and Affect: Mood normal.         Behavior: Behavior normal.         Thought Content: Thought content normal.       CURRENT MEDICATIONS     Current Outpatient Medications:     acetaminophen (TYLENOL) 500 mg tablet, Take 2 tablets (1,000 mg total) by mouth every 6 (six) hours as needed for mild pain, Disp: 90 tablet, Rfl: 0    acetaminophen (TYLENOL) 650 mg CR tablet, Take 1 tablet (650 mg total) by mouth every 8 (eight) hours as needed for mild pain, Disp: 30 tablet, Rfl: 0    Calcium Carb-Cholecalciferol 600-400 MG-UNIT TABS, Take by mouth, Disp: , Rfl:     cephalexin (KEFLEX) 500 mg capsule, 4 tablets 1 hour before dental visit repeat is necessary, Disp: 40 capsule, Rfl: 0    cyanocobalamin (VITAMIN B-12) 1000 MCG tablet, Take 1,000 mcg by mouth in the morning, Disp: , Rfl:     ezetimibe (ZETIA) 10 mg tablet, Take 1 tablet (10 mg total) by mouth in the morning, Disp: 90 tablet, Rfl: 3    gabapentin (Neurontin) 100 mg capsule, Take 1 capsule (100 mg total) by mouth 3 (three) times a day, Disp: 90 capsule, Rfl: 0    hydroCHLOROthiazide 25 mg tablet, TAKE 1 TABLET (25 MG TOTAL) BY MOUTH DAILY., Disp: 90 tablet, Rfl: 3    ibuprofen (MOTRIN) 800 mg tablet, Take 1 tablet (800 mg total) by mouth every 8 (eight) hours as needed for mild pain, Disp: 30 tablet, Rfl: 0    levothyroxine 75 mcg tablet, TAKE 1 TABLET BY MOUTH EVERY DAY, Disp: 90 tablet, Rfl: 1    metoprolol succinate (TOPROL-XL) 50 mg 24 hr tablet, Take 1 tablet (50 mg total) by mouth daily, Disp: 30 tablet, Rfl: 0    Multiple Vitamins-Minerals (multivitamin with minerals) tablet, Take 1 tablet by mouth daily, Disp: 30 tablet, Rfl: 2     omeprazole (PriLOSEC) 40 MG capsule, TAKE 1 CAPSULE (40 MG TOTAL) BY MOUTH 2 (TWO) TIMES A DAY FOR 30 DAYS, THEN 1 CAPSULE (40 MG TOTAL) DAILY., Disp: 90 capsule, Rfl: 1    PARoxetine (PAXIL) 10 mg tablet, Take 1 tablet (10 mg total) by mouth daily, Disp: 90 tablet, Rfl: 1    tiZANidine (ZANAFLEX) 4 mg tablet, Take 1 tablet (4 mg total) by mouth every 8 (eight) hours as needed for muscle spasms, Disp: 60 tablet, Rfl: 0    triamcinolone (KENALOG) 0.1 % cream, As needed, Disp: , Rfl:     valsartan (DIOVAN) 160 mg tablet, TAKE 1 TABLET BY MOUTH EVERY DAY, Disp: 90 tablet, Rfl: 1    enoxaparin (LOVENOX) 40 mg/0.4 mL, Inject 0.4 mL (40 mg total) under the skin daily for 28 days To start postoperatively, Disp: 11.2 mL, Rfl: 0    PAST MEDICAL & SURGICAL HISTORY     Past Medical History:   Diagnosis Date    Disease of thyroid gland     GERD (gastroesophageal reflux disease)     History of hepatitis B     Hyperlipidemia     Hypertension      Past Surgical History:   Procedure Laterality Date    ABCESS DRAINAGE      tonsil at age 3    COLONOSCOPY      JOINT REPLACEMENT      [place] ARTHRP KNE CONDYLE&PLATU MEDIAL&LAT COMPARTMENTS Left 02/28/2024    Procedure: ARTHROPLASTY KNEE TOTAL W ROBOT;  Surgeon: Ignacio Leblanc MD;  [facility];  Service: Orthopedics    [place] ARTHRP KNE CONDYLE&PLATU MEDIAL&LAT COMPARTMENTS Right 11/4/2024    Procedure: ARTHROPLASTY KNEE TOTAL W ROBOT;  Surgeon: Ignacio Leblanc MD;  Location: [facility];  Service: Orthopedics     SOCIAL & FAMILY HISTORY     Social History     Socioeconomic History    Marital status: /Civil Union     Spouse name: Not on file    Number of children: Not on file    Years of education: Not on file    Highest education level: Not on file   Occupational History    Not on file   Tobacco Use    Smoking status: Never    Smokeless tobacco: Never   Vaping Use    Vaping status: Never Used   Substance and Sexual Activity    Alcohol use: Yes     Alcohol/week: 7.0  standard drinks of alcohol     Types: 7 Glasses of wine per week     Comment: social    Drug use: Never    Sexual activity: Yes     Partners: Male   Other Topics Concern    Not on file   Social History Narrative    Not on file     Social Drivers of Health     Financial Resource Strain: Low Risk  (11/30/2023)    [facility]    Overall Financial Resource Strain (CARDIA)     Difficulty of Paying Living Expenses: Not hard at all   Food Insecurity: No Food Insecurity (11/4/2024)    Nursing - Inadequate Food Risk Classification     Worried About Running Out of Food in the Last Year: Not on file     Ran Out of Food in the Last Year: Not on file     Ran Out of Food in the Last Year: 1   Transportation Needs: No Transportation Needs (11/4/2024)    Nursing - Transportation Risk Classification     Lack of Transportation: Not on file     Lack of Transportation: 2   Physical Activity: Sufficiently Active (11/30/2023)    [facility], [facility]    Exercise Vital Sign     Days of Exercise per Week: 3 days     Minutes of Exercise per Session: 60 min   Stress: No Stress Concern Present (11/30/2023)    [facility], [facility]    [employer] - Occupational Stress Questionnaire     Feeling of Stress : Not at all   Social Connections: Socially Integrated (11/30/2023)    [facility], [facility] and Aff[facility]ates    Social Connection and Isolation Panel [NHANES]     Frequency of Communication with Friends and Family: More than three times a week     Frequency of Social Gatherings with Friends and Family: More than three times a week     Attends Oriental orthodox Services: More than 4 times per year     Active Member of Clubs or Organizations: Yes     Attends Club or Organization Meetings: More than 4 times per year     Marital Status:    Intimate Partner Violence: Unknown (2024)    Nursing IPS     Feels Physically and Emotionally Safe: Not on file     Physically Hurt by Someone: Not on file     Humiliated or Emotionally Abused by Someone: Not on file     Physically Hurt by Someone: 2     Hurt or Threatened by Someone: 2   Housing Stability: Unknown (2024)    Nursing: Inadequate Housing Risk Classification     Has Housing: Not on file     Worried About Losing Housing: Not on file     Unable to Get Utilities: Not on file     Unable to Pay for Housing in the Last Year: 2     Has Housin     Social History     Substance and Sexual Activity   Alcohol Use Yes    Alcohol/week: 7.0 standard drinks of alcohol    Types: 7 Glasses of wine per week    Comment: social       Social History     Substance and Sexual Activity   Drug Use Never     Social History     Tobacco Use   Smoking Status Never   Smokeless Tobacco Never     No family history on file.  ==  Dennis Beckwith DO  [facility] Internal Medicine  [facility] [place] [id number]  Office: [phone]  Fax: [phone]

## 2024-12-18 ENCOUNTER — OFFICE VISIT (OUTPATIENT)
Age: 64
End: 2024-12-18
Payer: MEDICARE

## 2024-12-18 VITALS
HEART RATE: 80 BPM | TEMPERATURE: 97.6 F | WEIGHT: 193.8 LBS | BODY MASS INDEX: 31.15 KG/M2 | DIASTOLIC BLOOD PRESSURE: 70 MMHG | SYSTOLIC BLOOD PRESSURE: 130 MMHG | HEIGHT: 66 IN | OXYGEN SATURATION: 93 %

## 2024-12-18 DIAGNOSIS — I10 PRIMARY HYPERTENSION: ICD-10-CM

## 2024-12-18 DIAGNOSIS — M17.11 PRIMARY OSTEOARTHRITIS OF RIGHT KNEE: ICD-10-CM

## 2024-12-18 DIAGNOSIS — I47.10 SVT (SUPRAVENTRICULAR TACHYCARDIA) (HCC): Primary | ICD-10-CM

## 2024-12-18 PROCEDURE — G2211 COMPLEX E/M VISIT ADD ON: HCPCS | Performed by: STUDENT IN AN ORGANIZED HEALTH CARE EDUCATION/TRAINING PROGRAM

## 2024-12-18 PROCEDURE — 99214 OFFICE O/P EST MOD 30 MIN: CPT | Performed by: STUDENT IN AN ORGANIZED HEALTH CARE EDUCATION/TRAINING PROGRAM

## 2024-12-18 NOTE — ASSESSMENT & PLAN NOTE
Stable.  Blood pressure acceptable in office today at 130/70.  Continue hydrochlorothiazide milligrams daily, metoprolol succinate 50 mg daily, valsartan 160 mg daily.

## 2024-12-18 NOTE — ASSESSMENT & PLAN NOTE
On 11/29/2024 patient contacted her cardiologist office stating that she had an SVT episode on Wednesday, November 27.    She was evaluated at [facility] emergency department. Patient states she was treated with IV medication that I presume to be adenosine.   This marks 2 emergency department trips 1 at [facility] and 1 in [place].  In July patient believes she had another episode but was able to abort the SVT herself at home by using vagal maneuvers and cold water.   Patient's cardiologist suggested electrophysiologist evaluation to consider ablation.   Discussed recent episodes with patient.  Most recent episode she was traveling to [place] and was not taking her medications for 2 days.  Noncompliance with metoprolol may have precipitated SVT.  I explained to patient the importance of remaining compliant with medications and she understands.  Continue metoprolol 50 mg daily.  Agreed with recommendation to see electrophysiologist.  Patient has contact information for electrophysiologist and will call and schedule appointment with them.  Heart regular rate and rhythm and pulse normal on today's exam.

## 2024-12-18 NOTE — ASSESSMENT & PLAN NOTE
Patient was seen by myself in office on 10/11/2024 in consultation for preop appointment before right total knee arthroplasty.    Patient ultimately underwent right total knee arthroplasty as scheduled on 11/04/2024.    She has been participating in physical therapy and has been seen by orthopedics doctors in follow-up.    Patient was most recently seen by orthopedics on 11/19/2024 which was 2 weeks status post right total knee arthroplasty and she was doing well at that time.  She was instructed to follow-up 4 to 6 weeks for additional postop appointment and was seen in their office again on 12/17/2024.   She reports that she is doing well today has been compliant with physical therapy and pain is controlled.  No issues at this time.

## 2024-12-20 ENCOUNTER — HOSPITAL ENCOUNTER (OUTPATIENT)
Dept: MAMMOGRAPHY | Facility: IMAGING CENTER | Age: 64
Discharge: HOME | End: 2024-12-20
Payer: MEDICARE

## 2024-12-20 VITALS — WEIGHT: 193 LBS | BODY MASS INDEX: 31.02 KG/M2 | HEIGHT: 66 IN

## 2024-12-20 DIAGNOSIS — Z12.31 ENCOUNTER FOR SCREENING MAMMOGRAM FOR MALIGNANT NEOPLASM OF BREAST: ICD-10-CM

## 2024-12-20 PROCEDURE — 77067 SCR MAMMO BI INCL CAD: CPT

## 2024-12-20 PROCEDURE — 77063 BREAST TOMOSYNTHESIS BI: CPT

## 2024-12-23 ENCOUNTER — RESULTS FOLLOW-UP (OUTPATIENT)
Dept: OBGYN CLINIC | Facility: CLINIC | Age: 64
End: 2024-12-23

## 2025-03-08 DIAGNOSIS — I47.10 SVT (SUPRAVENTRICULAR TACHYCARDIA) (HCC): ICD-10-CM

## 2025-03-08 DIAGNOSIS — I10 PRIMARY HYPERTENSION: ICD-10-CM

## 2025-03-08 DIAGNOSIS — R06.09 DYSPNEA ON EXERTION: ICD-10-CM

## 2025-03-08 DIAGNOSIS — R79.89 ELEVATED TROPONIN: ICD-10-CM

## 2025-03-10 RX ORDER — VALSARTAN 160 MG/1
160 TABLET ORAL DAILY
Qty: 90 TABLET | Refills: 3 | Status: SHIPPED | OUTPATIENT
Start: 2025-03-10

## 2025-03-24 ENCOUNTER — TELEPHONE (OUTPATIENT)
Dept: CARDIOLOGY CLINIC | Facility: CLINIC | Age: 65
End: 2025-03-24

## 2025-03-24 DIAGNOSIS — I47.10 SVT (SUPRAVENTRICULAR TACHYCARDIA) (HCC): Primary | ICD-10-CM

## 2025-03-24 NOTE — TELEPHONE ENCOUNTER
Pt called in, was told by Dr. Parnell that she should see Dr. Eric he sent her a pt message through [employer]. I did not see EP Referral in the system. Can one please be created? She does have appt with Dr. Erci 6/18/2025. Thank you.

## 2025-03-30 DIAGNOSIS — E03.9 ACQUIRED HYPOTHYROIDISM: ICD-10-CM

## 2025-03-30 DIAGNOSIS — F41.9 ANXIETY: ICD-10-CM

## 2025-03-30 DIAGNOSIS — E78.2 MIXED HYPERLIPIDEMIA: ICD-10-CM

## 2025-03-31 RX ORDER — EZETIMIBE 10 MG/1
10 TABLET ORAL DAILY
Qty: 90 TABLET | Refills: 3 | Status: SHIPPED | OUTPATIENT
Start: 2025-03-31

## 2025-03-31 RX ORDER — LEVOTHYROXINE SODIUM 75 UG/1
75 TABLET ORAL DAILY
Qty: 90 TABLET | Refills: 3 | Status: SHIPPED | OUTPATIENT
Start: 2025-03-31

## 2025-03-31 RX ORDER — PAROXETINE 10 MG/1
10 TABLET, FILM COATED ORAL DAILY
Qty: 90 TABLET | Refills: 3 | Status: SHIPPED | OUTPATIENT
Start: 2025-03-31

## 2025-04-02 ENCOUNTER — TELEPHONE (OUTPATIENT)
Age: 65
End: 2025-04-02

## 2025-04-02 NOTE — TELEPHONE ENCOUNTER
Tried to schedule patient and spouse for a 1 year follow up with . I want both scheduled for same day. Or transfer call to office thanks.

## 2025-05-07 DIAGNOSIS — Z96.651 AFTERCARE FOLLOWING RIGHT KNEE JOINT REPLACEMENT SURGERY: Primary | ICD-10-CM

## 2025-05-07 DIAGNOSIS — Z47.1 AFTERCARE FOLLOWING LEFT KNEE JOINT REPLACEMENT SURGERY: ICD-10-CM

## 2025-05-07 DIAGNOSIS — Z47.1 AFTERCARE FOLLOWING RIGHT KNEE JOINT REPLACEMENT SURGERY: Primary | ICD-10-CM

## 2025-05-07 DIAGNOSIS — Z96.652 AFTERCARE FOLLOWING LEFT KNEE JOINT REPLACEMENT SURGERY: ICD-10-CM

## 2025-05-09 DIAGNOSIS — Z00.6 ENCOUNTER FOR EXAMINATION FOR NORMAL COMPARISON OR CONTROL IN CLINICAL RESEARCH PROGRAM: ICD-10-CM

## 2025-05-14 RX ORDER — FERROUS SULFATE 324(65)MG
324 TABLET, DELAYED RELEASE (ENTERIC COATED) ORAL
COMMUNITY
Start: 2025-03-06

## 2025-05-15 ENCOUNTER — OFFICE VISIT (OUTPATIENT)
Dept: OBGYN CLINIC | Facility: HOSPITAL | Age: 65
End: 2025-05-15
Payer: MEDICARE

## 2025-05-15 ENCOUNTER — HOSPITAL ENCOUNTER (OUTPATIENT)
Dept: RADIOLOGY | Facility: HOSPITAL | Age: 65
Discharge: HOME | End: 2025-05-15
Attending: ORTHOPAEDIC SURGERY
Payer: MEDICARE

## 2025-05-15 ENCOUNTER — APPOINTMENT (OUTPATIENT)
Dept: LAB | Facility: CLINIC | Age: 65
End: 2025-05-15
Payer: MEDICARE

## 2025-05-15 VITALS — HEIGHT: 66 IN | BODY MASS INDEX: 31.15 KG/M2

## 2025-05-15 DIAGNOSIS — Z96.653 HISTORY OF TOTAL BILATERAL KNEE REPLACEMENT (TKR): Primary | ICD-10-CM

## 2025-05-15 DIAGNOSIS — Z96.651 AFTERCARE FOLLOWING RIGHT KNEE JOINT REPLACEMENT SURGERY: ICD-10-CM

## 2025-05-15 DIAGNOSIS — Z00.6 ENCOUNTER FOR EXAMINATION FOR NORMAL COMPARISON OR CONTROL IN CLINICAL RESEARCH PROGRAM: ICD-10-CM

## 2025-05-15 DIAGNOSIS — Z47.1 AFTERCARE FOLLOWING RIGHT KNEE JOINT REPLACEMENT SURGERY: ICD-10-CM

## 2025-05-15 DIAGNOSIS — Z47.1 AFTERCARE FOLLOWING LEFT KNEE JOINT REPLACEMENT SURGERY: ICD-10-CM

## 2025-05-15 DIAGNOSIS — Z96.652 AFTERCARE FOLLOWING LEFT KNEE JOINT REPLACEMENT SURGERY: ICD-10-CM

## 2025-05-15 PROCEDURE — 73560 X-RAY EXAM OF KNEE 1 OR 2: CPT

## 2025-05-15 PROCEDURE — 36415 COLL VENOUS BLD VENIPUNCTURE: CPT

## 2025-05-15 PROCEDURE — 99213 OFFICE O/P EST LOW 20 MIN: CPT | Performed by: ORTHOPAEDIC SURGERY

## 2025-05-15 NOTE — PROGRESS NOTES
Name: Delia Morin      : 1960       MRN: [id number]   Encounter Provider: Ignacio Leblanc MD   Encounter Date: 05/15/25  Encounter department: [facility] [place]     ASSESSMENT & PLAN:  Assessment & Plan  History of total bilateral knee replacement (TKR)  6 months removed from right total knee replacement, 1 year removed left total knee replacement, this patient has a well clinic radiographic exam.  Ad derek. use of both knees is allowed, dental antibiotic prophylaxis is discussed, and office follow-up in 6 months time with x-rays 2 views right knee upon arrival would be appropriate for her 1 year checkup            To do next visit:  No follow-ups on file.    _____________________________________________________  CHIEF COMPLAINT:  Chief Complaint   Patient presents with    Right Knee - Post-op    Left Knee - Post-op         SUBJECTIVE:  Delia Morin is a 64 y.o. female who presents describes ongoing relief for the preprocedure weightbearing pain she had been experiencing in both knees.  She describes no pain in either knee, and she describes improvement in her golf game as direct results of her knee replacement surgeries she is 6 months removed from the right side, 1 year removed the left        PAST MEDICAL HISTORY:  Past Medical History:   Diagnosis Date    Disease of thyroid gland     GERD (gastroesophageal reflux disease)     History of hepatitis B     Hyperlipidemia     Hypertension        PAST SURGICAL HISTORY:  Past Surgical History:   Procedure Laterality Date    ABCESS DRAINAGE      tonsil at age 3    COLONOSCOPY      JOINT REPLACEMENT      [place] ARTHRP KNE CONDYLE&PLATU MEDIAL&LAT COMPARTMENTS Left 2024    Procedure: ARTHROPLASTY KNEE TOTAL W ROBOT;  Surgeon: Ignacio Leblanc MD;  Location: [facility];  Service: Orthopedics    [place] ARTHRP KNE CONDYLE&PLATU MEDIAL&LAT COMPARTMENTS Right 2024    Procedure: ARTHROPLASTY KNEE TOTAL W ROBOT;   "Surgeon: Ignacio Leblanc MD;  Location:  MAIN OR;  Service: Orthopedics       FAMILY HISTORY:  Family History   Problem Relation Age of Onset    No Known Problems Mother     No Known Problems Father     No Known Problems Maternal Grandmother     No Known Problems Maternal Grandfather     No Known Problems Paternal Grandmother     No Known Problems Paternal Grandfather     No Known Problems Maternal Aunt     No Known Problems Paternal Aunt        SOCIAL HISTORY:  Social History[1]    MEDICATIONS:  Current Medications[2]    ALLERGIES:  Allergies[3]    LABS:  HgA1c:   Lab Results   Component Value Date    HGBA1C 5.7 (H) 10/11/2024     BMP:   Lab Results   Component Value Date    GLUCOSE 86 10/29/2015    CALCIUM 8.9 11/05/2024     10/29/2015    K 4.0 11/05/2024    CO2 27 11/05/2024     11/05/2024    BUN 16 11/05/2024    CREATININE 0.96 11/05/2024     CBC: No components found for: \"CBC\"    _____________________________________________________  PHYSICAL EXAMINATION:  Vital signs: Ht 5' 6\" (1.676 m)   BMI 31.15 kg/m²   General: No acute distress, awake and alert  Psychiatric: Mood and affect appear appropriate  HEENT: Trachea Midline, No torticollis, no apparent facial trauma  Cardiovascular: No audible murmurs; Extremities appear perfused  Pulmonary: No audible wheezing or stridor  Skin: No open lesions; see further details (if any) below    MUSCULOSKELETAL EXAMINATION:  Ortho Exam  Gait patterns are strong and confident strides.  Neither hip is motion loss.  Neither thigh has atrophy.  Each knee has a healed anterior scar.  Each knee is full extension.  Each knee has excellent deep flexion.  There is no mid flexion valgus instability.  There is no tenderness in either calf    ___________________________________________________  STUDIES REVIEWED:  I personally reviewed the images obtained in office today and my independent interpretation is as follows:  I have personally reviewed x-rays of both knees " from today my interpretation as follows:  2 views of the right and left knee show total knee replacements in satisfactory position      PROCEDURES PERFORMED:    Procedures    None preformed       Ignacio Leblanc MD       [1]   Social History  Tobacco Use    Smoking status: Never    Smokeless tobacco: Never   Vaping Use    Vaping status: Never Used   Substance Use Topics    Alcohol use: Yes     Alcohol/week: 7.0 standard drinks of alcohol     Types: 7 Glasses of wine per week     Comment: social    Drug use: Never   [2]   Current Outpatient Medications:     ferrous sulfate 324 (65 Fe) mg, Take 324 mg by mouth daily before breakfast, Disp: , Rfl:     acetaminophen (TYLENOL) 500 mg tablet, Take 2 tablets (1,000 mg total) by mouth every 6 (six) hours as needed for mild pain, Disp: 90 tablet, Rfl: 0    acetaminophen (TYLENOL) 650 mg CR tablet, Take 1 tablet (650 mg total) by mouth every 8 (eight) hours as needed for mild pain, Disp: 30 tablet, Rfl: 0    Calcium Carb-Cholecalciferol 600-400 MG-UNIT TABS, Take by mouth, Disp: , Rfl:     cephalexin (KEFLEX) 500 mg capsule, 4 tablets 1 hour before dental visit repeat is necessary, Disp: 40 capsule, Rfl: 0    cyanocobalamin (VITAMIN B-12) 1000 MCG tablet, Take 1,000 mcg by mouth in the morning, Disp: , Rfl:     enoxaparin (LOVENOX) 40 mg/0.4 mL, Inject 0.4 mL (40 mg total) under the skin daily for 28 days To start postoperatively, Disp: 11.2 mL, Rfl: 0    ezetimibe (ZETIA) 10 mg tablet, Take 1 tablet (10 mg total) by mouth in the morning, Disp: 90 tablet, Rfl: 3    gabapentin (Neurontin) 100 mg capsule, Take 1 capsule (100 mg total) by mouth 3 (three) times a day, Disp: 90 capsule, Rfl: 0    hydroCHLOROthiazide 25 mg tablet, TAKE 1 TABLET (25 MG TOTAL) BY MOUTH DAILY., Disp: 90 tablet, Rfl: 3    ibuprofen (MOTRIN) 800 mg tablet, Take 1 tablet (800 mg total) by mouth every 8 (eight) hours as needed for mild pain, Disp: 30 tablet, Rfl: 0    levothyroxine 75 mcg tablet,  Take 1 tablet (75 mcg total) by mouth daily, Disp: 90 tablet, Rfl: 3    metoprolol succinate (TOPROL-XL) 50 mg 24 hr tablet, Take 1 tablet (50 mg total) by mouth daily, Disp: 30 tablet, Rfl: 0    Multiple Vitamins-Minerals (multivitamin with minerals) tablet, Take 1 tablet by mouth daily, Disp: 30 tablet, Rfl: 2    omeprazole (PriLOSEC) 40 MG capsule, TAKE 1 CAPSULE (40 MG TOTAL) BY MOUTH 2 (TWO) TIMES A DAY FOR 30 DAYS, THEN 1 CAPSULE (40 MG TOTAL) DAILY., Disp: 90 capsule, Rfl: 1    PARoxetine (PAXIL) 10 mg tablet, Take 1 tablet (10 mg total) by mouth daily, Disp: 90 tablet, Rfl: 3    tiZANidine (ZANAFLEX) 4 mg tablet, Take 1 tablet (4 mg total) by mouth every 8 (eight) hours as needed for muscle spasms, Disp: 60 tablet, Rfl: 0    triamcinolone (KENALOG) 0.1 % cream, As needed, Disp: , Rfl:     valsartan (DIOVAN) 160 mg tablet, Take 1 tablet (160 mg total) by mouth daily, Disp: 90 tablet, Rfl: 3  [3]   Allergies  Allergen Reactions    Amlodipine Swelling and Other (See Comments)     Ankle swelling      Atorvastatin Cough and Myalgia     Cough    Penicillins Rash    Sulfa Antibiotics Rash

## 2025-05-15 NOTE — ASSESSMENT & PLAN NOTE
6 months removed from right total knee replacement, 1 year removed left total knee replacement, this patient has a well clinic radiographic exam.  Ad derek. use of both knees is allowed, dental antibiotic prophylaxis is discussed, and office follow-up in 6 months time with x-rays 2 views right knee upon arrival would be appropriate for her 1 year checkup

## 2025-05-25 ENCOUNTER — RA CDI HCC (OUTPATIENT)
Dept: OTHER | Facility: HOSPITAL | Age: 65
End: 2025-05-25

## 2025-05-27 LAB
APOB+LDLR+PCSK9 GENE MUT ANL BLD/T: NOT DETECTED
BRCA1+BRCA2 DEL+DUP + FULL MUT ANL BLD/T: NOT DETECTED
MLH1+MSH2+MSH6+PMS2 GN DEL+DUP+FUL M: NOT DETECTED

## 2025-06-03 ENCOUNTER — OFFICE VISIT (OUTPATIENT)
Age: 65
End: 2025-06-03
Payer: MEDICARE

## 2025-06-03 VITALS
HEART RATE: 80 BPM | DIASTOLIC BLOOD PRESSURE: 82 MMHG | SYSTOLIC BLOOD PRESSURE: 122 MMHG | OXYGEN SATURATION: 96 % | WEIGHT: 189 LBS | HEIGHT: 66 IN | BODY MASS INDEX: 30.37 KG/M2

## 2025-06-03 DIAGNOSIS — I10 PRIMARY HYPERTENSION: ICD-10-CM

## 2025-06-03 DIAGNOSIS — I47.10 SVT (SUPRAVENTRICULAR TACHYCARDIA) (HCC): Primary | ICD-10-CM

## 2025-06-03 PROCEDURE — 93000 ELECTROCARDIOGRAM COMPLETE: CPT | Performed by: INTERNAL MEDICINE

## 2025-06-03 PROCEDURE — 99214 OFFICE O/P EST MOD 30 MIN: CPT | Performed by: INTERNAL MEDICINE

## 2025-06-03 NOTE — PROGRESS NOTES
Cardiology Consultation  [facility]      Delia Morin  1960  [id number]  [facility] [place]  [address]    [place] [address] [phone]    1. SVT (supraventricular tachycardia) (HCC)  POCT ECG    Echo      2. Primary hypertension  POCT ECG    Echo             Discussion/Summary    Recurrent Episode of SVT at [facility] which broke with adenosine per pt report 2023, recurrent in [facility] 11/2024, also thinks episode April this year)  A. Normal nuclear stress 11/2023 followin SVT  Htn, controlled  Elev lipids, atorvastatin stated caused cough on zetia, cor  calcium score  11 on 9/2023  H/o bilateral recent TKR-significant improvement mobility    Plan  1) Pt has visit EP regarding SVT recurrence 6/18/2025-[place].  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  Updated echocardiogram ordered.      History:     63-year-old female prebilateral knee arthroplasty.    Originally saw two years ago after an episode of SVT while at [facility] in [place].  Stated had adenosine with break in her SVT.  No formal ECG seen but reviewed ED documentation    Some dyspnea on exertion and minimally elevated troponin during SVT.  This prompted stress testing which showed no evidence of ischemia.    Recurrent SVT this past fall in the [facility].  States got adenosine again in the emergency department.    Coronary calcium score with total score 11.  Was placed on atorvastatin primary prevention due to elevated lipids but had a cough and changed to Zetia for which she remains on    Blood pressure meds changes well.  Blood pressure well-controlled.    Much more mobile now with both knees replaced.  She is able to golf without difficulty and walk the golf course.      Patient Active Problem List   Diagnosis    Primary osteoarthritis of both  knees    Vitamin D deficiency    SVT (supraventricular tachycardia) (HCC)    Single major depressive episode, in full remission (HCC)    Raynaud's syndrome without gangrene    Primary hypertension    Mixed hyperlipidemia    History of hepatitis B    Hiatal hernia    Hepatic steatosis    Allergic rhinitis    Acquired hypothyroidism    Gastroesophageal reflux disease    Elevated MCV    Primary osteoarthritis of left knee    S/P total knee arthroplasty, left    Acute blood loss anemia    Leukocytosis    Primary osteoarthritis of right knee    History of total bilateral knee replacement (TKR)     Past Medical History:   Diagnosis Date    Disease of thyroid gland     GERD (gastroesophageal reflux disease)     History of hepatitis B     Hyperlipidemia     Hypertension      Social History     Socioeconomic History    Marital status: /Civil Union     Spouse name: Not on file    Number of children: Not on file    Years of education: Not on file    Highest education level: Not on file   Occupational History    Not on file   Tobacco Use    Smoking status: Never    Smokeless tobacco: Never   Vaping Use    Vaping status: Never Used   Substance and Sexual Activity    Alcohol use: Yes     Alcohol/week: 7.0 standard drinks of alcohol     Types: 7 Glasses of wine per week     Comment: social    Drug use: Never    Sexual activity: Yes     Partners: Male   Other Topics Concern    Not on file   Social History Narrative    Not on file     Social Drivers of Health     Financial Resource Strain: Low Risk  (11/30/2023)    Received from [facility]    Overall Financial Resource Strain (CARDIA)     Difficulty of Paying Living Expenses: Not hard at all   Food Insecurity: No Food Insecurity (11/4/2024)    Nursing - Inadequate Food Risk Classification     Worried About Running Out of Food in the Last Year: Not on file     Ran Out of Food in the Last Year: Not on  file     Ran Out of Food in the Last Year: Never true   Transportation Needs: No Transportation Needs (11/4/2024)    Nursing - Transportation Risk Classification     Lack of Transportation: Not on file     Lack of Transportation: No   Physical Activity: Sufficiently Active (11/30/2023)    Received from [facility]    Exercise Vital Sign     On average, how many days per week do you engage in moderate to strenuous exercise (like a brisk walk)?: 3 days     On average, how many minutes do you engage in exercise at this level?: 60 min   Stress: No Stress Concern Present (11/30/2023)    Received from [facility]    [employer] [place] [employer] - Occupational Stress Questionnaire     Feeling of Stress : Not at all   Social Connections: Socially Integrated (11/30/2023)    Received from [facility]    Social Connection and Isolation Panel     In a typical week, how many times do you talk on the phone with family, friends, or neighbors?: More than three times a week     How often do you get together with friends or relatives?: More than three times a week     How often do you attend Methodist or Quaker services?: More than 4 times per year     Do you belong to any clubs or organizations such as Methodist groups, unions, fraternal or athletic groups, or school groups?: Yes     How often do you attend meetings of the clubs or organizations you belong to?: More than 4 times per year     Are you , , , , never , or living with a partner?:    Intimate Partner Violence: Unknown (11/4/2024)    Nursing IPS     Feels Physically and Emotionally Safe: Not on file     Physically Hurt by Someone: Not on file     Humiliated or Emotionally Abused by Someone: Not on file      Physically Hurt by Someone: No     Hurt or Threatened by Someone: No   Housing Stability: Unknown (2024)    Nursing: Inadequate Housing Risk Classification     Has Housing: Not on file     Worried About Losing Housing: Not on file     Unable to Get Utilities: Not on file     Unable to Pay for Housing in the Last Year: No     Has Housin      Family History   Problem Relation Name Age of Onset    No Known Problems Mother      No Known Problems Father      No Known Problems Maternal Grandmother      No Known Problems Maternal Grandfather      No Known Problems Paternal Grandmother      No Known Problems Paternal Grandfather      No Known Problems Maternal Aunt      No Known Problems Paternal Aunt       Past Surgical History:   Procedure Laterality Date    ABCESS DRAINAGE      tonsil at age 3    COLONOSCOPY      JOINT REPLACEMENT      RI ARTHRP KNE CONDYLE&PLATU MEDIAL&LAT COMPARTMENTS Left 2024    Procedure: ARTHROPLASTY KNEE TOTAL W ROBOT;  Surgeon: Ignacio Leblanc MD;  Location: [facility];  Service: Orthopedics    RI ARTHRP KNE CONDYLE&PLATU MEDIAL&LAT COMPARTMENTS Right 2024    Procedure: ARTHROPLASTY KNEE TOTAL W ROBOT;  Surgeon: Ignacio Leblanc MD;  Location: [facility];  Service: Orthopedics       Current Outpatient Medications:     Calcium Carb-Cholecalciferol 600-400 MG-UNIT TABS, Take by mouth, Disp: , Rfl:     ezetimibe (ZETIA) 10 mg tablet, Take 1 tablet (10 mg total) by mouth in the morning, Disp: 90 tablet, Rfl: 3    hydroCHLOROthiazide 25 mg tablet, TAKE 1 TABLET (25 MG TOTAL) BY MOUTH DAILY., Disp: 90 tablet, Rfl: 3    levothyroxine 75 mcg tablet, Take 1 tablet (75 mcg total) by mouth daily, Disp: 90 tablet, Rfl: 3    metoprolol succinate (TOPROL-XL) 50 mg 24 hr tablet, Take 1 tablet (50 mg total) by mouth daily, Disp: 30 tablet, Rfl: 0    Multiple Vitamins-Minerals (multivitamin with minerals) tablet, Take 1 tablet by mouth daily, Disp: 30 tablet, Rfl: 2     PARoxetine (PAXIL) 10 mg tablet, Take 1 tablet (10 mg total) by mouth daily, Disp: 90 tablet, Rfl: 3    triamcinolone (KENALOG) 0.1 % cream, As needed, Disp: , Rfl:     valsartan (DIOVAN) 160 mg tablet, Take 1 tablet (160 mg total) by mouth daily, Disp: 90 tablet, Rfl: 3    acetaminophen (TYLENOL) 500 mg tablet, Take 2 tablets (1,000 mg total) by mouth every 6 (six) hours as needed for mild pain, Disp: 90 tablet, Rfl: 0    acetaminophen (TYLENOL) 650 mg CR tablet, Take 1 tablet (650 mg total) by mouth every 8 (eight) hours as needed for mild pain, Disp: 30 tablet, Rfl: 0    cephalexin (KEFLEX) 500 mg capsule, 4 tablets 1 hour before dental visit repeat is necessary, Disp: 40 capsule, Rfl: 0    cyanocobalamin (VITAMIN B-12) 1000 MCG tablet, Take 1,000 mcg by mouth in the morning, Disp: , Rfl:     enoxaparin (LOVENOX) 40 mg/0.4 mL, Inject 0.4 mL (40 mg total) under the skin daily for 28 days To start postoperatively, Disp: 11.2 mL, Rfl: 0    ferrous sulfate 324 (65 Fe) mg, Take 324 mg by mouth daily before breakfast, Disp: , Rfl:     gabapentin (Neurontin) 100 mg capsule, Take 1 capsule (100 mg total) by mouth 3 (three) times a day, Disp: 90 capsule, Rfl: 0    ibuprofen (MOTRIN) 800 mg tablet, Take 1 tablet (800 mg total) by mouth every 8 (eight) hours as needed for mild pain, Disp: 30 tablet, Rfl: 0    omeprazole (PriLOSEC) 40 MG capsule, TAKE 1 CAPSULE (40 MG TOTAL) BY MOUTH 2 (TWO) TIMES A DAY FOR 30 DAYS, THEN 1 CAPSULE (40 MG TOTAL) DAILY., Disp: 90 capsule, Rfl: 1    tiZANidine (ZANAFLEX) 4 mg tablet, Take 1 tablet (4 mg total) by mouth every 8 (eight) hours as needed for muscle spasms, Disp: 60 tablet, Rfl: 0  Allergies   Allergen Reactions    Amlodipine Swelling and Other (See Comments)     Ankle swelling      Atorvastatin Cough and Myalgia     Cough    Penicillins Rash    Sulfa Antibiotics Rash       Social, Family and medication history as listed, reviewed and updated as necessary    Labs:   Lab Results  "  Component Value Date     10/29/2015    K 4.0 11/05/2024     11/05/2024    CO2 27 11/05/2024    BUN 16 11/05/2024    CREATININE 0.96 11/05/2024    CREATININE 0.97 10/11/2024    GLUCOSE 86 10/29/2015    CALCIUM 8.9 11/05/2024       Lab Results   Component Value Date    WBC 10.61 (H) 11/05/2024    HGB 11.1 (L) 11/05/2024    HGB 13.5 10/11/2024    HCT 32.3 (L) 11/05/2024    HCT 40.3 10/11/2024     11/05/2024     10/11/2024       Lab Results   Component Value Date    CHOL 297 10/29/2015    CHOL 289 08/07/2014     Lab Results   Component Value Date     10/29/2015     08/07/2014     Lab Results   Component Value Date    LDLCALC 160 (H) 10/29/2015    LDLCALC 166 (H) 08/07/2014     Lab Results   Component Value Date    TRIG 58 10/29/2015    TRIG 37 08/07/2014     No results found for: \"LDLDIRECT\"    Lab Results   Component Value Date    ALT 28 11/05/2024    ALT 30 10/11/2024    AST 24 11/05/2024    AST 27 10/11/2024    ALKPHOS 56 11/05/2024    ALKPHOS 83 10/11/2024             No results found for: \"NTBNP\"    Lab Results   Component Value Date    HGBA1C 5.7 (H) 10/11/2024    HGBA1C 5.5 02/07/2024    HGBA1C 5.8 (H) 10/04/2023       Imaging: Reviewed in epic      Review of Systems:  14 systems reviewed and negative with exception of the above       PHYSICAL EXAM:        Vitals:    06/03/25 1514   BP: 122/82   Pulse: 80   SpO2: 96%       Body mass index is 30.51 kg/m².  Weight (last 2 days)       Date/Time Weight    06/03/25 1514 85.7 (189)               Gen: No acute distress  HEENT: anicteric, mucous membranes moist  Neck: supple, no jugular venous distention, or carotid bruit  Heart: regular, normal s1 and s2, no murmur/rub or gallop  Lungs :clear to auscultation bilaterally, no rales/rhonchi or wheeze  Abdomen: soft nontender, normoactive bowel sounds, no organomegaly  Ext: warm and perfused, normal femoral pulses, no edema, or clubbing  Skin: warm, no rashes  Neuro: AAO x 3, no " focal findings  Psychiatric: normal affect  Musculoskeletal: no obvious joint deformities.        This note was completed in part utilizing direct voice recognition software.   Grammatical errors, random word insertion, spelling mistakes, and incomplete sentences may be an occasional consequence of the system secondary to software limitations, ambient noise and hardware issues. At the time of dictation, efforts were made to edit, clarify and /or correct errors.  Please read the chart carefully and recognize, using context, where substitutions have occurred.  If you have any questions or concerns about the context, text or information contained within the body of this dictation, please contact myself, the provider, for further clarification.

## 2025-06-04 ENCOUNTER — OFFICE VISIT (OUTPATIENT)
Age: 65
End: 2025-06-04
Payer: MEDICARE

## 2025-06-04 VITALS
TEMPERATURE: 98.1 F | WEIGHT: 188 LBS | DIASTOLIC BLOOD PRESSURE: 70 MMHG | HEART RATE: 61 BPM | OXYGEN SATURATION: 98 % | HEIGHT: 66 IN | RESPIRATION RATE: 16 BRPM | BODY MASS INDEX: 30.22 KG/M2 | SYSTOLIC BLOOD PRESSURE: 124 MMHG

## 2025-06-04 DIAGNOSIS — K21.9 GASTROESOPHAGEAL REFLUX DISEASE, UNSPECIFIED WHETHER ESOPHAGITIS PRESENT: ICD-10-CM

## 2025-06-04 DIAGNOSIS — E66.811 CLASS 1 OBESITY WITH SERIOUS COMORBIDITY AND BODY MASS INDEX (BMI) OF 30.0 TO 30.9 IN ADULT, UNSPECIFIED OBESITY TYPE: ICD-10-CM

## 2025-06-04 DIAGNOSIS — K90.89 OTHER SPECIFIED INTESTINAL MALABSORPTION: ICD-10-CM

## 2025-06-04 DIAGNOSIS — E78.2 MIXED HYPERLIPIDEMIA: ICD-10-CM

## 2025-06-04 DIAGNOSIS — F10.90 ALCOHOL USE: ICD-10-CM

## 2025-06-04 DIAGNOSIS — I10 PRIMARY HYPERTENSION: ICD-10-CM

## 2025-06-04 DIAGNOSIS — Z23 ENCOUNTER FOR IMMUNIZATION: ICD-10-CM

## 2025-06-04 DIAGNOSIS — E03.9 ACQUIRED HYPOTHYROIDISM: ICD-10-CM

## 2025-06-04 DIAGNOSIS — I47.10 SVT (SUPRAVENTRICULAR TACHYCARDIA) (HCC): ICD-10-CM

## 2025-06-04 DIAGNOSIS — Z96.653 HISTORY OF TOTAL BILATERAL KNEE REPLACEMENT (TKR): ICD-10-CM

## 2025-06-04 DIAGNOSIS — Z12.11 SCREENING FOR COLON CANCER: ICD-10-CM

## 2025-06-04 DIAGNOSIS — F32.5 SINGLE MAJOR DEPRESSIVE EPISODE, IN FULL REMISSION (HCC): ICD-10-CM

## 2025-06-04 DIAGNOSIS — Z00.00 MEDICARE ANNUAL WELLNESS VISIT, SUBSEQUENT: Primary | ICD-10-CM

## 2025-06-04 PROBLEM — R71.8 ELEVATED MCV: Status: RESOLVED | Noted: 2024-02-07 | Resolved: 2025-06-04

## 2025-06-04 PROBLEM — E66.9 OBESITY (BMI 30-39.9): Status: ACTIVE | Noted: 2025-06-04

## 2025-06-04 PROBLEM — M17.12 PRIMARY OSTEOARTHRITIS OF LEFT KNEE: Status: RESOLVED | Noted: 2024-02-07 | Resolved: 2025-06-04

## 2025-06-04 PROBLEM — Z96.652 S/P TOTAL KNEE ARTHROPLASTY, LEFT: Status: RESOLVED | Noted: 2024-02-28 | Resolved: 2025-06-04

## 2025-06-04 PROBLEM — M17.11 PRIMARY OSTEOARTHRITIS OF RIGHT KNEE: Status: RESOLVED | Noted: 2024-11-04 | Resolved: 2025-06-04

## 2025-06-04 PROBLEM — M17.0 PRIMARY OSTEOARTHRITIS OF BOTH KNEES: Status: RESOLVED | Noted: 2023-06-22 | Resolved: 2025-06-04

## 2025-06-04 PROBLEM — D62 ACUTE BLOOD LOSS ANEMIA: Status: RESOLVED | Noted: 2024-02-29 | Resolved: 2025-06-04

## 2025-06-04 PROBLEM — Z86.19 HISTORY OF HEPATITIS B: Status: RESOLVED | Noted: 2018-02-12 | Resolved: 2025-06-04

## 2025-06-04 PROCEDURE — G2211 COMPLEX E/M VISIT ADD ON: HCPCS | Performed by: INTERNAL MEDICINE

## 2025-06-04 PROCEDURE — G0009 ADMIN PNEUMOCOCCAL VACCINE: HCPCS | Performed by: INTERNAL MEDICINE

## 2025-06-04 PROCEDURE — 90677 PCV20 VACCINE IM: CPT | Performed by: INTERNAL MEDICINE

## 2025-06-04 PROCEDURE — 99214 OFFICE O/P EST MOD 30 MIN: CPT | Performed by: INTERNAL MEDICINE

## 2025-06-04 PROCEDURE — G0439 PPPS, SUBSEQ VISIT: HCPCS | Performed by: INTERNAL MEDICINE

## 2025-06-04 RX ORDER — NALTREXONE HYDROCHLORIDE 50 MG/1
50 TABLET, FILM COATED ORAL DAILY
Qty: 30 TABLET | Refills: 5 | Status: SHIPPED | OUTPATIENT
Start: 2025-06-04

## 2025-06-04 RX ORDER — TIRZEPATIDE 2.5 MG/.5ML
2.5 INJECTION, SOLUTION SUBCUTANEOUS WEEKLY
Qty: 2 ML | Refills: 0 | Status: SHIPPED | OUTPATIENT
Start: 2025-06-04 | End: 2025-07-02

## 2025-06-04 NOTE — ASSESSMENT & PLAN NOTE
Has 2  - 3 drinks per day  Has been counseled to cut down on alcohol intake  Patient is interested in starting naltrexone   Orders:    naltrexone (REVIA) 50 mg tablet; Take 1 tablet (50 mg total) by mouth daily

## 2025-06-04 NOTE — ASSESSMENT & PLAN NOTE
Denies abnormal mood changes  No suicidal ideation  Overall patient is in good spirit  PLAN: continue paroxetine 10 mg daily.

## 2025-06-04 NOTE — PATIENT INSTRUCTIONS
Medicare Preventive Visit Patient Instructions  Thank you for completing your Welcome to Medicare Visit or Medicare Annual Wellness Visit today. Your next wellness visit will be due in one year (6/5/2026).  The screening/preventive services that you may require over the next 5-10 years are detailed below. Some tests may not apply to you based off risk factors and/or age. Screening tests ordered at today's visit but not completed yet may show as past due. Also, please note that scanned in results may not display below.  Preventive Screenings:  Service Recommendations Previous Testing/Comments   Colorectal Cancer Screening  * Colonoscopy    * Fecal Occult Blood Test (FOBT)/Fecal Immunochemical Test (FIT)  * Fecal DNA/Cologuard Test  * Flexible Sigmoidoscopy Age: 45-75 years old   Colonoscopy: every 10 years (may be performed more frequently if at higher risk)  OR  FOBT/FIT: every 1 year  OR  Cologuard: every 3 years  OR  Sigmoidoscopy: every 5 years  Screening may be recommended earlier than age 45 if at higher risk for colorectal cancer. Also, an individualized decision between you and your healthcare provider will decide whether screening between the ages of 76-85 would be appropriate. Colonoscopy: 05/07/2014  FOBT/FIT: Not on file  Cologuard: Not on file  Sigmoidoscopy: Not on file          Breast Cancer Screening Age: 40+ years old  Frequency: every 1-2 years  Not required if history of left and right mastectomy Mammogram: 12/20/2024    Screening Current   Cervical Cancer Screening Between the ages of 21-29, pap smear recommended once every 3 years.   Between the ages of 30-65, can perform pap smear with HPV co-testing every 5 years.   Recommendations may differ for women with a history of total hysterectomy, cervical cancer, or abnormal pap smears in past. Pap Smear: 06/05/2024    Screening Not Indicated   Hepatitis C Screening Once for adults born between 1945 and 1965  More frequently in patients at high risk  for Hepatitis C Hep C Antibody: Not on file        Diabetes Screening 1-2 times per year if you're at risk for diabetes or have pre-diabetes Fasting glucose: 122 mg/dL (11/5/2024)  A1C: 5.7 % (10/11/2024)  Screening Current   Cholesterol Screening Once every 5 years if you don't have a lipid disorder. May order more often based on risk factors. Lipid panel: Not on file    Screening Not Indicated  History Lipid Disorder     Other Preventive Screenings Covered by Medicare:  Abdominal Aortic Aneurysm (AAA) Screening: covered once if your at risk. You're considered to be at risk if you have a family history of AAA.  Lung Cancer Screening: covers low dose CT scan once per year if you meet all of the following conditions: (1) Age 55-77; (2) No signs or symptoms of lung cancer; (3) Current smoker or have quit smoking within the last 15 years; (4) You have a tobacco smoking history of at least 20 pack years (packs per day multiplied by number of years you smoked); (5) You get a written order from a healthcare provider.  Glaucoma Screening: covered annually if you're considered high risk: (1) You have diabetes OR (2) Family history of glaucoma OR (3)  aged 50 and older OR (4)  American aged 65 and older  Osteoporosis Screening: covered every 2 years if you meet one of the following conditions: (1) You're estrogen deficient and at risk for osteoporosis based off medical history and other findings; (2) Have a vertebral abnormality; (3) On glucocorticoid therapy for more than 3 months; (4) Have primary hyperparathyroidism; (5) On osteoporosis medications and need to assess response to drug therapy.   Last bone density test (DXA Scan): 08/29/2024.  HIV Screening: covered annually if you're between the age of 15-65. Also covered annually if you are younger than 15 and older than 65 with risk factors for HIV infection. For pregnant patients, it is covered up to 3 times per  pregnancy.    Immunizations:  Immunization Recommendations   Influenza Vaccine Annual influenza vaccination during flu season is recommended for all persons aged >= 6 months who do not have contraindications   Pneumococcal Vaccine   * Pneumococcal conjugate vaccine = PCV13 (Prevnar 13), PCV15 (Vaxneuvance), PCV20 (Prevnar 20)  * Pneumococcal polysaccharide vaccine = PPSV23 (Pneumovax) Adults 19-63 yo with certain risk factors or if 65+ yo  If never received any pneumonia vaccine: recommend Prevnar 20 (PCV20)  Give PCV20 if previously received 1 dose of PCV13 or PPSV23   Hepatitis B Vaccine 3 dose series if at intermediate or high risk (ex: diabetes, end stage renal disease, liver disease)   Respiratory syncytial virus (RSV) Vaccine - COVERED BY MEDICARE PART D  * RSVPreF3 (Arexvy) CDC recommends that adults 60 years of age and older may receive a single dose of RSV vaccine using shared clinical decision-making (SCDM)   Tetanus (Td) Vaccine - COST NOT COVERED BY MEDICARE PART B Following completion of primary series, a booster dose should be given every 10 years to maintain immunity against tetanus. Td may also be given as tetanus wound prophylaxis.   Tdap Vaccine - COST NOT COVERED BY MEDICARE PART B Recommended at least once for all adults. For pregnant patients, recommended with each pregnancy.   Shingles Vaccine (Shingrix) - COST NOT COVERED BY MEDICARE PART B  2 shot series recommended in those 19 years and older who have or will have weakened immune systems or those 50 years and older     Health Maintenance Due:      Topic Date Due   • Hepatitis C Screening  Never done   • Colorectal Cancer Screening  05/07/2024   • Breast Cancer Screening: Mammogram  12/20/2025     Immunizations Due:      Topic Date Due   • Pneumococcal Vaccine: 65+ Years (1 of 1 - PCV) Never done   • COVID-19 Vaccine (5 - 2024-25 season) 09/01/2024   • Influenza Vaccine (Season Ended) 09/01/2025     Advance Directives   What are advance  directives?  Advance directives are legal documents that state your wishes and plans for medical care. These plans are made ahead of time in case you lose your ability to make decisions for yourself. Advance directives can apply to any medical decision, such as the treatments you want, and if you want to donate organs.   What are the types of advance directives?  There are many types of advance directives, and each state has rules about how to use them. You may choose a combination of any of the following:  Living will:  This is a written record of the treatment you want. You can also choose which treatments you do not want, which to limit, and which to stop at a certain time. This includes surgery, medicine, IV fluid, and tube feedings.   Durable power of  for healthcare (DPAHC):  This is a written record that states who you want to make healthcare choices for you when you are unable to make them for yourself. This person, called a proxy, is usually a family member or a friend. You may choose more than 1 proxy.  Do not resuscitate (DNR) order:  A DNR order is used in case your heart stops beating or you stop breathing. It is a request not to have certain forms of treatment, such as CPR. A DNR order may be included in other types of advance directives.  Medical directive:  This covers the care that you want if you are in a coma, near death, or unable to make decisions for yourself. You can list the treatments you want for each condition. Treatment may include pain medicine, surgery, blood transfusions, dialysis, IV or tube feedings, and a ventilator (breathing machine).  Values history:  This document has questions about your views, beliefs, and how you feel and think about life. This information can help others choose the care that you would choose.  Why are advance directives important?  An advance directive helps you control your care. Although spoken wishes may be used, it is better to have your wishes  written down. Spoken wishes can be misunderstood, or not followed. Treatments may be given even if you do not want them. An advance directive may make it easier for your family to make difficult choices about your care.   Fall Prevention    Fall prevention  includes ways to make your home and other areas safer. It also includes ways you can move more carefully to prevent a fall. Health conditions that cause changes in your blood pressure, vision, or muscle strength and coordination may increase your risk for falls. Medicines may also increase your risk for falls if they make you dizzy, weak, or sleepy.   Fall prevention tips:   Stand or sit up slowly.    Use assistive devices as directed.    Wear shoes that fit well and have soles that .    Wear a personal alarm.    Stay active.    Manage your medical conditions.    Home Safety Tips:  Add items to prevent falls in the bathroom.    Keep paths clear.    Install bright lights in your home.    Keep items you use often on shelves within reach.    Paint or place reflective tape on the edges of your stairs.    Weight Management   Why it is important to manage your weight:  Being overweight increases your risk of health conditions such as heart disease, high blood pressure, type 2 diabetes, and certain types of cancer. It can also increase your risk for osteoarthritis, sleep apnea, and other respiratory problems. Aim for a slow, steady weight loss. Even a small amount of weight loss can lower your risk of health problems.  How to lose weight safely:  A safe and healthy way to lose weight is to eat fewer calories and get regular exercise. You can lose up about 1 pound a week by decreasing the number of calories you eat by 500 calories each day.   Healthy meal plan for weight management:  A healthy meal plan includes a variety of foods, contains fewer calories, and helps you stay healthy. A healthy meal plan includes the following:  Eat whole-grain foods more often.  A  healthy meal plan should contain fiber. Fiber is the part of grains, fruits, and vegetables that is not broken down by your body. Whole-grain foods are healthy and provide extra fiber in your diet. Some examples of whole-grain foods are whole-wheat breads and pastas, oatmeal, brown rice, and bulgur.  Eat a variety of vegetables every day.  Include dark, leafy greens such as spinach, kale, joaquin greens, and mustard greens. Eat yellow and orange vegetables such as carrots, sweet potatoes, and winter squash.   Eat a variety of fruits every day.  Choose fresh or canned fruit (canned in its own juice or light syrup) instead of juice. Fruit juice has very little or no fiber.  Eat low-fat dairy foods.  Drink fat-free (skim) milk or 1% milk. Eat fat-free yogurt and low-fat cottage cheese. Try low-fat cheeses such as mozzarella and other reduced-fat cheeses.  Choose meat and other protein foods that are low in fat.  Choose beans or other legumes such as split peas or lentils. Choose fish, skinless poultry (chicken or turkey), or lean cuts of red meat (beef or pork). Before you cook meat or poultry, cut off any visible fat.   Use less fat and oil.  Try baking foods instead of frying them. Add less fat, such as margarine, sour cream, regular salad dressing and mayonnaise to foods. Eat fewer high-fat foods. Some examples of high-fat foods include french fries, doughnuts, ice cream, and cakes.  Eat fewer sweets.  Limit foods and drinks that are high in sugar. This includes candy, cookies, regular soda, and sweetened drinks.  Exercise:  Exercise at least 30 minutes per day on most days of the week. Some examples of exercise include walking, biking, dancing, and swimming. You can also fit in more physical activity by taking the stairs instead of the elevator or parking farther away from stores. Ask your healthcare provider about the best exercise plan for you.    © Copyright [employer] 2018 Information is for End User's use  only and may not be sold, redistributed or otherwise used for commercial purposes. All illustrations and images included in CareNotes® are the copyrighted property of [email or web address] or [employer]

## 2025-06-04 NOTE — ASSESSMENT & PLAN NOTE
Has a healthy lifestyle routine  Healthy home cooked meals  Encouraged to continue lifestyle modifications.    Orders:    Lipid panel

## 2025-06-04 NOTE — ASSESSMENT & PLAN NOTE
Hypothyroidism, continues on Levothyroxine 75 mcg daily.   Orders:    TSH, 3rd generation with Free T4 reflex; Future

## 2025-06-04 NOTE — PROGRESS NOTES
Name: Delia Morin      : 1960      MRN: [id number]  Encounter Provider: Todd Kendrick MD  Encounter Date: 2025   Encounter department: [facility] INTERNAL MEDICINE  :  Assessment & Plan  Medicare annual wellness visit, subsequent  Delia is a 64 year old with PMHX of SVT  Follows up with cardiologist  Complains about excessive alcohol intake and excessive weight  She is interested in starting a medication to assist with alcohol cessation  Patient would like to get a prescription to help improve her weight.  She does not smoke nor do drugs   She is compliant with her medications,  Up to date on colonoscopy, mammography, dexa scan   Uptodate on shingles/RSV vaccine, due for pneumococcal vaccine.    PLAN  Pneumococcal Vaccine  Naltrexone 50 mg daily  Zepbound   Continue healthy diet, exercise routine and healthy sleep pattern        Gastroesophageal reflux disease, unspecified whether esophagitis present  Longstanding history of GERD on omeprazole   Symptoms have been well controlled on omeprazole.   Will refer to GI for possible EGD  Orders:    Ambulatory Referral to Gastroenterology; Future    SVT (supraventricular tachycardia) (HCC)  History of SVT  Follows up with Cardiologist   Has an upcoming appointment with EP on  for possible ablation  Continues on metoprolol 50 mg daily.  No recent episodes.  She has been counseled on alcohol cessation as this could precipitate SVT.  Orders:    CBC and differential    Comprehensive metabolic panel    Primary hypertension  Bld pressures well controlled on HTCZ 25mg daily   Orders:    CBC and differential    Comprehensive metabolic panel    UA (URINE) with reflex to Scope; Future    Acquired hypothyroidism  Hypothyroidism, continues on Levothyroxine 75 mcg daily.   Orders:    TSH, 3rd generation with Free T4 reflex; Future    Mixed hyperlipidemia  Has a healthy lifestyle routine  Healthy home cooked meals  Encouraged to continue lifestyle  modifications.    Orders:    Lipid panel    History of total bilateral knee replacement (TKR)  Has no complaints  Denies pains, or limitations to activities.        Single major depressive episode, in full remission (HCC)  Denies abnormal mood changes  No suicidal ideation  Overall patient is in good spirit  PLAN: continue paroxetine 10 mg daily.        Screening for colon cancer  Self reports Colonoscopy done about 2 to 3 years ago and was asked to report back in 5 years.  No report on file as [place].      Orders:    Ambulatory Referral to Gastroenterology; Future    Class 1 obesity with serious comorbidity and body mass index (BMI) of 30.0 to 30.9 in adult, unspecified obesity type    Continue with exercise routine   Healthy lifestyle.   Orders:    tirzepatide (Zepbound) 2.5 mg/0.5 mL auto-injector; Inject 0.5 mL (2.5 mg total) under the skin once a week for 28 days    Alcohol use  Has 2  - 3 drinks per day  Has been counseled to cut down on alcohol intake  Patient is interested in starting naltrexone   Orders:    naltrexone (REVIA) 50 mg tablet; Take 1 tablet (50 mg total) by mouth daily    Other specified intestinal malabsorption  GERD on Omeprazole  Will check levels of micronutrients to R/O deficiency due to side effects of PPI  Orders:    Iron Panel (Includes Ferritin, Iron Sat%, Iron, and TIBC); Future    Vitamin B12    Encounter for immunization  Due for pneumococcal Vaccine   Orders:    Pneumococcal Conjugate Vaccine 20-valent (Pcv20)       Preventive health issues were discussed with patient, and age appropriate screening tests were ordered as noted in patient's After Visit Summary. Personalized health advice and appropriate referrals for health education or preventive services given if needed, as noted in patient's After Visit Summary.    History of Present Illness     OSVALDO Lin is a healthy 64 - year old female with PMHx of SVT, comes in today for her medicare wellness  visit.    Delia is concerned about her weight and alcohol intake She requests to be started on medications to help her cut down on alcohol intake and loose some weight.     She typically has 2- 3 drinks per day, She understands excessive alcohol intake is bad for her health and could be a factor into her excess weight. She does not smoke nor do drugs.    She is very physically active, has a  twice weekly, goes golfing 3 days per week. She sleeps 8 - 10 hrs/day on average, wakes up feeling refreshed, no known sleep apnea.    She has a history of SVT, follows up with the cardiologist, has an up coming appointment on 6/18 with the EP.    Has a longstanding history of GERD on omeprazole, we discussed GI specialist follow up for possible EGD.    She reports she had a colonoscopy 2 - 3 years ago and is not due till 5 years. No record found on file as [place]. She is up to date on mammography, Dexa scan, she sees the dermatologist yearly, ophthalmologist and ENT yearly.    She is up to date on RSV, Shingles vaccine but due for pneumococcal vaccine    She is overall a healthy 64 year old in very good spirit, denies any life stressors.    Patient Care Team:  Todd Kendrick MD as PCP - General  MD Amanda Barrett MD    Review of Systems   Constitutional:  Negative for chills and fever.   HENT:  Negative for ear pain and sore throat.    Eyes:  Negative for pain and visual disturbance.   Respiratory:  Negative for cough and shortness of breath.    Cardiovascular:  Negative for chest pain and palpitations.   Gastrointestinal:  Negative for abdominal pain and vomiting.   Genitourinary:  Negative for dysuria and hematuria.   Musculoskeletal:  Negative for arthralgias and back pain.   Skin:  Negative for color change and rash.   Neurological:  Negative for seizures and syncope.   All other systems reviewed and are negative.      Medical History Reviewed by provider this encounter:        Annual Wellness Visit Questionnaire   Delia is here for her Subsequent Wellness visit.     Health Risk Assessment:   Patient rates overall health as very good. Patient feels that their physical health rating is slightly better. Patient is very satisfied with their life. Eyesight was rated as same. Hearing was rated as same. Patient feels that their emotional and mental health rating is same. Patients states they are sometimes angry. Patient states they are sometimes unusually tired/fatigued. Pain experienced in the last 7 days has been none. Patient states that she has experienced no weight loss or gain in last 6 months.     Depression Screening:   PHQ-9 Score: 0      Fall Risk Screening:   In the past year, patient has experienced: history of falling in past year    Number of falls: 1  Injured during fall?: Yes    Feels unsteady when standing or walking?: No    Worried about falling?: No      Urinary Incontinence Screening:   Patient has not leaked urine accidently in the last six months.     Home Safety:  Patient does not have trouble with stairs inside or outside of their home. Patient has working smoke alarms and has working carbon monoxide detector. Home safety hazards include: none.     Nutrition:   Current diet is Limited junk food.     Medications:   Patient is currently taking over-the-counter supplements. OTC medications include: see medication list. Patient is able to manage medications.     Activities of Daily Living (ADLs)/Instrumental Activities of Daily Living (IADLs):   Walk and transfer into and out of bed and chair?: Yes  Dress and groom yourself?: Yes    Bathe or shower yourself?: Yes    Feed yourself? Yes  Do your laundry/housekeeping?: Yes  Manage your money, pay your bills and track your expenses?: Yes  Make your own meals?: Yes    Do your own shopping?: Yes    Previous Hospitalizations:   Any hospitalizations or ED visits within the last 12 months?: Yes    How many hospitalizations have you  "had in the last year?: 1-2    Advance Care Planning:   Living will: Yes    Durable POA for healthcare: Yes    Advanced directive: Yes      Preventive Screenings      Cardiovascular Screening:    General: Screening Not Indicated and History Lipid Disorder      Diabetes Screening:     General: Screening Current      Breast Cancer Screening:     General: Screening Current      Cervical Cancer Screening:    General: Screening Not Indicated      Lung Cancer Screening:     General: Screening Not Indicated    Screening, Brief Intervention, and Referral to Treatment (SBIRT)     Screening  Typical number of drinks in a day: 2  Typical number of drinks in a week: 14  Interpretation: Low risk drinking behavior.    Single Item Drug Screening:  How often have you used an illegal drug (including marijuana) or a prescription medication for non-medical reasons in the past year? never    Single Item Drug Screen Score: 0  Interpretation: Negative screen for possible drug use disorder    Social Drivers of Health     Financial Resource Strain: Low Risk  (2023)    Received from [facility]    Overall Financial Resource Strain (CARDIA)     Difficulty of Paying Living Expenses: Not hard at all   Food Insecurity: No Food Insecurity (2024)    Nursing - Inadequate Food Risk Classification     Ran Out of Food in the Last Year: Never true   Transportation Needs: No Transportation Needs (2024)    Nursing - Transportation Risk Classification     Lack of Transportation: No   Housing Stability: Unknown (2024)    Nursing: Inadequate Housing Risk Classification     Unable to Pay for Housing in the Last Year: No     Has Housin   Utilities: Not At Risk (2024)    Nursing - Utilities Risk Classification     Threatened with loss of utilities: No     No results found.    Objective   /70   Pulse 61   Temp 98.1 °F (36.7 °C)   Resp 16   Ht 5' 6\" " (1.676 m)   Wt 85.3 kg (188 lb)   SpO2 98%   BMI 30.34 kg/m²     Physical Exam  Vitals and nursing note reviewed.   Constitutional:       General: She is not in acute distress.     Appearance: She is well-developed.   HENT:      Head: Normocephalic and atraumatic.     Eyes:      Conjunctiva/sclera: Conjunctivae normal.       Cardiovascular:      Rate and Rhythm: Normal rate and regular rhythm.      Heart sounds: No murmur heard.  Pulmonary:      Effort: Pulmonary effort is normal. No respiratory distress.      Breath sounds: Normal breath sounds.   Abdominal:      Palpations: Abdomen is soft.      Tenderness: There is no abdominal tenderness.     Musculoskeletal:         General: No swelling.      Cervical back: Neck supple.     Skin:     General: Skin is warm and dry.      Capillary Refill: Capillary refill takes less than 2 seconds.     Neurological:      Mental Status: She is alert.     Psychiatric:         Mood and Affect: Mood normal.           Physical Activity Assessment and Intervention:    Physical Activity Prescription completed with patient      Emotional and Mental Well-being, Sleep, Connectedness Assessment and Intervention:    Sleep/stress assessment performed    Depression and anxiety screening performed and reviewed    Counseled regarding sleep hygiene and aspects of healthy sleep      Tobacco and Toxic Substance Assessment and Intervention:     Alcohol cessation counseling provided    Alcohol cessation resource information provided

## 2025-06-04 NOTE — ASSESSMENT & PLAN NOTE
Bld pressures well controlled on HTCZ 25mg daily   Orders:    CBC and differential    Comprehensive metabolic panel    UA (URINE) with reflex to Scope; Future

## 2025-06-04 NOTE — ASSESSMENT & PLAN NOTE
Longstanding history of GERD on omeprazole   Symptoms have been well controlled on omeprazole.   Will refer to GI for possible EGD  Orders:    Ambulatory Referral to Gastroenterology; Future

## 2025-06-04 NOTE — ASSESSMENT & PLAN NOTE
History of SVT  Follows up with Cardiologist   Has an upcoming appointment with EP on 6/18 for possible ablation  Continues on metoprolol 50 mg daily.  No recent episodes.  She has been counseled on alcohol cessation as this could precipitate SVT.  Orders:    CBC and differential    Comprehensive metabolic panel

## 2025-06-10 ENCOUNTER — HOSPITAL ENCOUNTER (OUTPATIENT)
Dept: NON INVASIVE DIAGNOSTICS | Facility: HOSPITAL | Age: 65
Discharge: HOME | End: 2025-06-10
Payer: MEDICARE

## 2025-06-10 ENCOUNTER — RESULTS FOLLOW-UP (OUTPATIENT)
Dept: NON INVASIVE DIAGNOSTICS | Facility: HOSPITAL | Age: 65
End: 2025-06-10

## 2025-06-10 VITALS
WEIGHT: 188 LBS | DIASTOLIC BLOOD PRESSURE: 70 MMHG | HEART RATE: 61 BPM | HEIGHT: 66 IN | BODY MASS INDEX: 30.22 KG/M2 | SYSTOLIC BLOOD PRESSURE: 124 MMHG

## 2025-06-10 DIAGNOSIS — I47.10 SVT (SUPRAVENTRICULAR TACHYCARDIA) (HCC): ICD-10-CM

## 2025-06-10 DIAGNOSIS — I10 PRIMARY HYPERTENSION: ICD-10-CM

## 2025-06-10 LAB
AORTIC ROOT: 2.9 CM
ASCENDING AORTA: 3.2 CM
BSA FOR ECHO PROCEDURE: 1.95 M2
E WAVE DECELERATION TIME: 240 MS
E/A RATIO: 1.46
FRACTIONAL SHORTENING: 36 (ref 28–44)
INTERVENTRICULAR SEPTUM IN DIASTOLE (PARASTERNAL SHORT AXIS VIEW): 1 CM
INTERVENTRICULAR SEPTUM: 1 CM (ref 0.6–1.1)
LAAS-AP2: 20.7 CM2
LAAS-AP4: 22.7 CM2
LEFT ATRIUM SIZE: 3.3 CM
LEFT ATRIUM VOLUME (MOD BIPLANE): 63 ML
LEFT ATRIUM VOLUME INDEX (MOD BIPLANE): 32.3 ML/M2
LEFT INTERNAL DIMENSION IN SYSTOLE: 2.3 CM (ref 2.1–4)
LEFT VENTRICULAR INTERNAL DIMENSION IN DIASTOLE: 3.6 CM (ref 3.5–6)
LEFT VENTRICULAR POSTERIOR WALL IN END DIASTOLE: 1 CM
LEFT VENTRICULAR STROKE VOLUME: 37 ML
LV EF US.2D.A4C+ESTIMATED: 60 %
LVSV (TEICH): 37 ML
MV E'TISSUE VEL-LAT: 11 CM/S
MV E'TISSUE VEL-SEP: 6 CM/S
MV PEAK A VEL: 0.56 M/S
MV PEAK E VEL: 82 CM/S
MV STENOSIS PRESSURE HALF TIME: 69 MS
MV VALVE AREA P 1/2 METHOD: 3.19
RA PRESSURE ESTIMATED: 3 MMHG
RIGHT ATRIUM AREA SYSTOLE A4C: 12.3 CM2
RIGHT VENTRICLE ID DIMENSION: 2.8 CM
RV PSP: 27 MMHG
SL CV LEFT ATRIUM LENGTH A2C: 5.9 CM
SL CV LV EF: 65
SL CV PED ECHO LEFT VENTRICLE DIASTOLIC VOLUME (MOD BIPLANE) 2D: 56 ML
SL CV PED ECHO LEFT VENTRICLE SYSTOLIC VOLUME (MOD BIPLANE) 2D: 19 ML
TR MAX PG: 24 MMHG
TR PEAK VELOCITY: 2.5 M/S
TRICUSPID ANNULAR PLANE SYSTOLIC EXCURSION: 2 CM
TRICUSPID VALVE PEAK REGURGITATION VELOCITY: 2.45 M/S

## 2025-06-10 PROCEDURE — 93306 TTE W/DOPPLER COMPLETE: CPT

## 2025-06-10 PROCEDURE — 93306 TTE W/DOPPLER COMPLETE: CPT | Performed by: INTERNAL MEDICINE

## 2025-06-11 ENCOUNTER — TELEPHONE (OUTPATIENT)
Age: 65
End: 2025-06-11

## 2025-06-11 NOTE — TELEPHONE ENCOUNTER
PA for (Zepbound) 2.5 mg/0.5 mL auto-injector SUBMITTED to [facility]    via    [x]CMM-KEY: [id number]  []Surescripts-Case ID #   []Availity-Auth ID # NDC #   []Faxed to plan   []Other website   []Phone call Case ID #     [x]PA sent as URGENT    All office notes, labs and other pertaining documents and studies sent. Clinical questions answered. Awaiting determination from insurance company.     Turnaround time for your insurance to make a decision on your Prior Authorization can take 7-21 business days.

## 2025-06-11 NOTE — TELEPHONE ENCOUNTER
PA for (Zepbound) 2.5 mg/0.5 mL auto-injector EXCLUDED from plan       Reason:(Screenshot if applicable)        Message sent to office clinical pool Yes    Appeal is not warranted unless the following is met:    DX of ROSA and the below cardiac events:   You have established cardiovascular disease as evidenced by one of the following:    (1) Prior myocardial infarction    (2) Prior ischemic or hemorrhagic stroke    (3) Symptomatic peripheral arterial disease evidenced by one of the following:     (A) Intermittent claudication with ankle-brachial index less than 0.85 (at rest)     (B) Peripheral arterial revascularization procedure     (C) Amputation due to atherosclerotic disease

## 2025-06-18 ENCOUNTER — CONSULT (OUTPATIENT)
Dept: CARDIOLOGY CLINIC | Facility: CLINIC | Age: 65
End: 2025-06-18
Payer: MEDICARE

## 2025-06-18 VITALS
SYSTOLIC BLOOD PRESSURE: 124 MMHG | BODY MASS INDEX: 30.33 KG/M2 | HEART RATE: 69 BPM | DIASTOLIC BLOOD PRESSURE: 70 MMHG | HEIGHT: 66 IN | WEIGHT: 188.7 LBS

## 2025-06-18 DIAGNOSIS — R06.09 DYSPNEA ON EXERTION: ICD-10-CM

## 2025-06-18 DIAGNOSIS — I73.00 RAYNAUD'S SYNDROME WITHOUT GANGRENE: Primary | ICD-10-CM

## 2025-06-18 DIAGNOSIS — I10 PRIMARY HYPERTENSION: ICD-10-CM

## 2025-06-18 DIAGNOSIS — I47.10 SVT (SUPRAVENTRICULAR TACHYCARDIA) (HCC): ICD-10-CM

## 2025-06-18 DIAGNOSIS — R79.89 ELEVATED TROPONIN: ICD-10-CM

## 2025-06-18 LAB
ATRIAL RATE: 69 BPM
P AXIS: 23 DEGREES
PR INTERVAL: 140 MS
QRS AXIS: -3 DEGREES
QRSD INTERVAL: 76 MS
QT INTERVAL: 418 MS
QTC INTERVAL: 448 MS
T WAVE AXIS: 50 DEGREES
VENTRICULAR RATE: 69 BPM

## 2025-06-18 PROCEDURE — 93000 ELECTROCARDIOGRAM COMPLETE: CPT | Performed by: INTERNAL MEDICINE

## 2025-06-18 PROCEDURE — 99205 OFFICE O/P NEW HI 60 MIN: CPT | Performed by: INTERNAL MEDICINE

## 2025-06-18 RX ORDER — METOPROLOL SUCCINATE 50 MG/1
75 TABLET, EXTENDED RELEASE ORAL DAILY
Qty: 135 TABLET | Refills: 3 | Status: SHIPPED | OUTPATIENT
Start: 2025-06-18

## 2025-06-18 NOTE — PROGRESS NOTES
EPS Consultation/New Patient Evaluation - Delia Morin 64 y.o. female MRN: [id number]           ASSESSMENT:  1. Raynaud's syndrome without gangrene        2. SVT (supraventricular tachycardia) (HCC)  Ambulatory referral to Cardiac Electrophysiology    POCT ECG    metoprolol succinate (TOPROL-XL) 50 mg 24 hr tablet      3. Primary hypertension  metoprolol succinate (TOPROL-XL) 50 mg 24 hr tablet      4. Elevated troponin  metoprolol succinate (TOPROL-XL) 50 mg 24 hr tablet      5. 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  I estimated 95 to 98% success rate with approximately 1% risk of complications.  Other options are to increase her beta-blocker.  She had swelling on amlodipine in the past therefore Cardizem would not be a good option for her.  She would like to try going up on her metoprolol at this point in time we will increase it to 75 mg once daily.  I explained to her that really given her heart rate of 69 bpm maximum dose she would likely tolerate would be 100 mg daily    I will follow-up with her in 6 months to 1 year    CC/HPI:   Consult for SVT.  3 episodes of SVT in life. Always ended up in ED.  August 2023.  [facility]. [facility].  Went on for 10 hours. 185 bpm.    November 2024 145.   Was treated with adenosine.  March 2025 had one 185 bpm.  Got 6 mg adenosine in ambulance and 12 mg adenosine and broke.     Saw Dr. Parnell    She is breaking through metoprolol 50 mg daily           Review of Systems   Constitutional: Negative for chills and fever.   HENT:  Negative for congestion and sore throat.    Eyes:  Negative for blurred vision and double vision.   Cardiovascular:  Negative for chest pain, claudication, dyspnea on exertion,  "leg swelling, near-syncope, orthopnea, palpitations, paroxysmal nocturnal dyspnea and syncope.   Respiratory:  Negative for cough, shortness of breath and sputum production.    Hematologic/Lymphatic: Negative for bleeding problem. Does not bruise/bleed easily.   Skin:  Negative for itching and rash.   Musculoskeletal:  Negative for arthritis and joint pain.   Gastrointestinal:  Negative for abdominal pain, nausea and vomiting.   Genitourinary:  Negative for hematuria.   Neurological:  Negative for dizziness, focal weakness, headaches, light-headedness and weakness.   Psychiatric/Behavioral:  Negative for depression. The patient is not nervous/anxious.            Objective:     Vitals: Blood pressure 124/70, pulse 69, height 5' 6\" (1.676 m), weight 85.6 kg (188 lb 11.2 oz)., Body mass index is 30.46 kg/m².,        Physical Exam:    GEN: Delia Morin appears well, alert and oriented x 3, pleasant and cooperative   HEENT: pupils equal, round, and reactive to light; extraocular muscles intact  NECK: supple, no carotid bruits   HEART: regular rhythm, normal S1 and S2, no murmurs, clicks, gallops or rubs   LUNGS: clear to auscultation bilaterally; no wheezes, rales, or rhonchi   ABDOMEN: normal bowel sounds, soft, no tenderness, no distention  EXTREMITIES: peripheral pulses normal; no clubbing, cyanosis, or edema  NEURO: no focal findings   SKIN: normal without suspicious lesions on exposed skin    Medications:    Current Medications[1]     Family History[2]  Social History     Socioeconomic History    Marital status: /Civil Union     Spouse name: Not on file    Number of children: Not on file    Years of education: Not on file    Highest education level: Not on file   Occupational History    Not on file   Tobacco Use    Smoking status: Never    Smokeless tobacco: Never   Vaping Use    Vaping status: Never Used   Substance and Sexual Activity    Alcohol use: Yes     Alcohol/week: 7.0 standard drinks of alcohol "     Types: 7 Glasses of wine per week     Comment: social    Drug use: Never    Sexual activity: Yes     Partners: Male   Other Topics Concern    Not on file   Social History Narrative    Not on file     Social Drivers of Health     Financial Resource Strain: Low Risk  (11/30/2023)    [facility]    Overall Financial Resource Strain (CARDIA)     Difficulty of Paying Living Expenses: Not hard at all   Food Insecurity: No Food Insecurity (6/4/2025)    Nursing - Inadequate Food Risk Classification     Worried About Running Out of Food in the Last Year: Never true     Ran Out of Food in the Last Year: Never true     Ran Out of Food in the Last Year: Never true   Transportation Needs: No Transportation Needs (6/4/2025)    PRAPARE - Transportation     Lack of Transportation (Medical): No     Lack of Transportation (Non-Medical): No   Physical Activity: Sufficiently Active (11/30/2023)    Received from [facility] and [facility]ates    Exercise Vital Sign     On average, how many days per week do you engage in moderate to strenuous exercise (like a brisk walk)?: 3 days     On average, how many minutes do you engage in exercise at this level?: 60 min   Stress: No Stress Concern Present (11/30/2023)    [facility]    [place] of Occupational Health - Occupational Stress Questionnaire     Feeling of Stress : Not at all   Social Connections: Socially Integrated (11/30/2023)    Received from [facility]    Social Connection and Isolation Panel     In a typical week, how many times do you talk on the phone with family, friends, or neighbors?: More than three times a week     How often do you get together with friends or  relatives?: More than three times a week     How often do you attend Mu-ism or Judaism services?: More than 4 times per year     Do you belong to any clubs or organizations such as Mu-ism groups, unions, fraternal or athletic groups, or school groups?: Yes     How often do you attend meetings of the clubs or organizations you belong to?: More than 4 times per year     Are you , , , , never , or living with a partner?:    Intimate Partner Violence: Unknown (11/4/2024)    Nursing IPS     Feels Physically and Emotionally Safe: Not on file     Physically Hurt by Someone: Not on file     Humiliated or Emotionally Abused by Someone: Not on file     Physically Hurt by Someone: No     Hurt or Threatened by Someone: No   Housing Stability: Low Risk  (6/4/2025)    Housing Stability Vital Sign     Unable to Pay for Housing in the Last Year: No     Number of Times Moved in the Last Year: 0     Homeless in the Last Year: No     Tobacco Use History[3]  Social History     Substance and Sexual Activity   Alcohol Use Yes    Alcohol/week: 7.0 standard drinks of alcohol    Types: 7 Glasses of wine per week    Comment: social       Labs & Results:  Below is the patient's most recent value for Albumin, ALT, AST, BUN, Calcium, Chloride, Cholesterol, CO2, Creatinine, GFR, Glucose, HDL, Hematocrit, Hemoglobin, Hemoglobin A1C, LDL, Magnesium, Phosphorus, Platelets, Potassium, PSA, Sodium, Triglycerides, and WBC.   Lab Results   Component Value Date    ALT 28 11/05/2024    AST 24 11/05/2024    BUN 16 11/05/2024    CALCIUM 8.9 11/05/2024     11/05/2024    CHOL 297 10/29/2015    CO2 27 11/05/2024    CREATININE 0.96 11/05/2024     10/29/2015    HCT 32.3 (L) 11/05/2024    HGB 11.1 (L) 11/05/2024    HGBA1C 5.7 (H) 10/11/2024    MG 1.8 (L) 10/04/2023    PHOS 3.2 10/04/2023     11/05/2024    K 4.0 11/05/2024     10/29/2015    TRIG 58 10/29/2015    WBC 10.61 (H) 11/05/2024      Note: for a comprehensive list of the patient's lab results, access the Results Review activity.            Cardiac testing:   No results found for this or any previous visit.    No results found for this or any previous visit.    No results found for this or any previous visit.    Results for orders placed during the hospital encounter of 09/21/23    NM myocardial perfusion spect (stress and/or rest)    Interpretation Summary    Stress ECG: No ST deviation is noted. The ECG was negative for ischemia. The stress ECG is negative for ischemia after maximal exercise, without reproduction of symptoms.    Perfusion: There are no perfusion defects.    Stress Function: Left ventricular function post-stress is normal.    Stress Combined Conclusion: The ECG and SPECT imaging portions of the stress study are concordant with no evidence of stress induced myocardial ischemia. Left ventricular perfusion is normal.                 [1]   Current Outpatient Medications:     Calcium Carb-Cholecalciferol 600-400 MG-UNIT TABS, Take by mouth, Disp: , Rfl:     ezetimibe (ZETIA) 10 mg tablet, Take 1 tablet (10 mg total) by mouth in the morning, Disp: 90 tablet, Rfl: 3    hydroCHLOROthiazide 25 mg tablet, TAKE 1 TABLET (25 MG TOTAL) BY MOUTH DAILY., Disp: 90 tablet, Rfl: 3    levothyroxine 75 mcg tablet, Take 1 tablet (75 mcg total) by mouth daily, Disp: 90 tablet, Rfl: 3    metoprolol succinate (TOPROL-XL) 50 mg 24 hr tablet, Take 1.5 tablets (75 mg total) by mouth daily, Disp: 135 tablet, Rfl: 3    Multiple Vitamins-Minerals (multivitamin with minerals) tablet, Take 1 tablet by mouth daily, Disp: 30 tablet, Rfl: 2    naltrexone (REVIA) 50 mg tablet, Take 1 tablet (50 mg total) by mouth daily, Disp: 30 tablet, Rfl: 5    omeprazole (PriLOSEC) 40 MG capsule, TAKE 1 CAPSULE (40 MG TOTAL) BY MOUTH 2 (TWO) TIMES A DAY FOR 30 DAYS, THEN 1 CAPSULE (40 MG TOTAL) DAILY., Disp: 90 capsule, Rfl: 1    PARoxetine (PAXIL) 10 mg tablet, Take 1  tablet (10 mg total) by mouth daily, Disp: 90 tablet, Rfl: 3    triamcinolone (KENALOG) 0.1 % cream, As needed, Disp: , Rfl:     valsartan (DIOVAN) 160 mg tablet, Take 1 tablet (160 mg total) by mouth daily, Disp: 90 tablet, Rfl: 3    tirzepatide (Zepbound) 2.5 mg/0.5 mL auto-injector, Inject 0.5 mL (2.5 mg total) under the skin once a week for 28 days (Patient not taking: Reported on 6/18/2025), Disp: 2 mL, Rfl: 0  [2]   Family History  Problem Relation Name Age of Onset    No Known Problems Mother      Arrhythmia Father Jacob Jordan     Heart Valve Disease Father Jacob Jordan     No Known Problems Maternal Grandmother      No Known Problems Maternal Grandfather      No Known Problems Paternal Grandmother      No Known Problems Paternal Grandfather      No Known Problems Maternal Aunt      No Known Problems Paternal Aunt     [3]   Social History  Tobacco Use   Smoking Status Never   Smokeless Tobacco Never

## 2025-06-20 ENCOUNTER — TELEPHONE (OUTPATIENT)
Dept: CARDIOLOGY CLINIC | Facility: CLINIC | Age: 65
End: 2025-06-20

## 2025-06-20 NOTE — TELEPHONE ENCOUNTER
Sent a fax request for [facility], [facility], and [facility] for the records requested by Dr. Eric.

## 2025-06-20 NOTE — TELEPHONE ENCOUNTER
Received [facility] ED note and EKG's from 03/22/25. Still waiting on the other two facilities' records.

## 2025-06-20 NOTE — TELEPHONE ENCOUNTER
----- Message from Jose Eric DO sent at 6/18/2025  1:42 PM EDT -----  Please get ecg from [facility] November 2024  [facility] march 22 2025 and [facility] in August 2023.  Would like actual copies if possible.    thanks

## 2025-06-23 NOTE — TELEPHONE ENCOUNTER
Received [facility] ED note and EKG's from 11/27/2024. Still waiting on the other facility's records.

## 2025-06-25 ENCOUNTER — APPOINTMENT (OUTPATIENT)
Dept: LAB | Facility: CLINIC | Age: 65
End: 2025-06-25
Payer: MEDICARE

## 2025-06-25 DIAGNOSIS — K90.89 OTHER SPECIFIED INTESTINAL MALABSORPTION: ICD-10-CM

## 2025-06-25 DIAGNOSIS — I10 PRIMARY HYPERTENSION: ICD-10-CM

## 2025-06-25 DIAGNOSIS — E03.9 ACQUIRED HYPOTHYROIDISM: ICD-10-CM

## 2025-06-25 LAB
ALBUMIN SERPL BCG-MCNC: 4.1 G/DL (ref 3.5–5)
ALP SERPL-CCNC: 87 U/L (ref 34–104)
ALT SERPL W P-5'-P-CCNC: 24 U/L (ref 7–52)
ANION GAP SERPL CALCULATED.3IONS-SCNC: 11 MMOL/L (ref 4–13)
AST SERPL W P-5'-P-CCNC: 24 U/L (ref 13–39)
BACTERIA UR QL AUTO: ABNORMAL /HPF
BASOPHILS # BLD AUTO: 0.1 THOUSANDS/ÂΜL (ref 0–0.1)
BASOPHILS NFR BLD AUTO: 1 % (ref 0–1)
BILIRUB SERPL-MCNC: 0.49 MG/DL (ref 0.2–1)
BILIRUB UR QL STRIP: NEGATIVE
BUN SERPL-MCNC: 23 MG/DL (ref 5–25)
CALCIUM SERPL-MCNC: 9.3 MG/DL (ref 8.4–10.2)
CHLORIDE SERPL-SCNC: 99 MMOL/L (ref 96–108)
CHOLEST SERPL-MCNC: 280 MG/DL (ref ?–200)
CLARITY UR: CLEAR
CO2 SERPL-SCNC: 28 MMOL/L (ref 21–32)
COLOR UR: ABNORMAL
CREAT SERPL-MCNC: 1.58 MG/DL (ref 0.6–1.3)
EOSINOPHIL # BLD AUTO: 0.35 THOUSAND/ÂΜL (ref 0–0.61)
EOSINOPHIL NFR BLD AUTO: 5 % (ref 0–6)
ERYTHROCYTE [DISTWIDTH] IN BLOOD BY AUTOMATED COUNT: 12.6 % (ref 11.6–15.1)
FERRITIN SERPL-MCNC: 14 NG/ML (ref 30–307)
GFR SERPL CREATININE-BSD FRML MDRD: 33 ML/MIN/1.73SQ M
GLUCOSE P FAST SERPL-MCNC: 84 MG/DL (ref 65–99)
GLUCOSE UR STRIP-MCNC: NEGATIVE MG/DL
HCT VFR BLD AUTO: 38.1 % (ref 34.8–46.1)
HDLC SERPL-MCNC: 71 MG/DL
HGB BLD-MCNC: 12.7 G/DL (ref 11.5–15.4)
HGB UR QL STRIP.AUTO: NEGATIVE
HYALINE CASTS #/AREA URNS LPF: ABNORMAL /LPF
IMM GRANULOCYTES # BLD AUTO: 0.03 THOUSAND/UL (ref 0–0.2)
IMM GRANULOCYTES NFR BLD AUTO: 0 % (ref 0–2)
IRON SATN MFR SERPL: 32 % (ref 15–50)
IRON SERPL-MCNC: 147 UG/DL (ref 50–212)
KETONES UR STRIP-MCNC: NEGATIVE MG/DL
LDLC SERPL CALC-MCNC: 153 MG/DL (ref 0–100)
LEUKOCYTE ESTERASE UR QL STRIP: NEGATIVE
LYMPHOCYTES # BLD AUTO: 2.2 THOUSANDS/ÂΜL (ref 0.6–4.47)
LYMPHOCYTES NFR BLD AUTO: 31 % (ref 14–44)
MCH RBC QN AUTO: 35.7 PG (ref 26.8–34.3)
MCHC RBC AUTO-ENTMCNC: 33.3 G/DL (ref 31.4–37.4)
MCV RBC AUTO: 107 FL (ref 82–98)
MONOCYTES # BLD AUTO: 0.62 THOUSAND/ÂΜL (ref 0.17–1.22)
MONOCYTES NFR BLD AUTO: 9 % (ref 4–12)
NEUTROPHILS # BLD AUTO: 3.74 THOUSANDS/ÂΜL (ref 1.85–7.62)
NEUTS SEG NFR BLD AUTO: 54 % (ref 43–75)
NITRITE UR QL STRIP: NEGATIVE
NON-SQ EPI CELLS URNS QL MICRO: ABNORMAL /HPF
NONHDLC SERPL-MCNC: 209 MG/DL
NRBC BLD AUTO-RTO: 0 /100 WBCS
PH UR STRIP.AUTO: 5.5 [PH]
PLATELET # BLD AUTO: 300 THOUSANDS/UL (ref 149–390)
PMV BLD AUTO: 10.3 FL (ref 8.9–12.7)
POTASSIUM SERPL-SCNC: 4 MMOL/L (ref 3.5–5.3)
PROT SERPL-MCNC: 6.9 G/DL (ref 6.4–8.4)
PROT UR STRIP-MCNC: ABNORMAL MG/DL
RBC # BLD AUTO: 3.56 MILLION/UL (ref 3.81–5.12)
RBC #/AREA URNS AUTO: ABNORMAL /HPF
SODIUM SERPL-SCNC: 138 MMOL/L (ref 135–147)
SP GR UR STRIP.AUTO: 1.02 (ref 1–1.03)
TIBC SERPL-MCNC: 463.4 UG/DL (ref 250–450)
TRANSFERRIN SERPL-MCNC: 331 MG/DL (ref 203–362)
TRIGL SERPL-MCNC: 278 MG/DL (ref ?–150)
TSH SERPL DL<=0.05 MIU/L-ACNC: 3.23 UIU/ML (ref 0.45–4.5)
UIBC SERPL-MCNC: 316 UG/DL (ref 155–355)
UROBILINOGEN UR STRIP-ACNC: <2 MG/DL
VIT B12 SERPL-MCNC: 1497 PG/ML (ref 180–914)
WBC # BLD AUTO: 7.04 THOUSAND/UL (ref 4.31–10.16)
WBC #/AREA URNS AUTO: ABNORMAL /HPF

## 2025-06-25 PROCEDURE — 83540 ASSAY OF IRON: CPT

## 2025-06-25 PROCEDURE — 81001 URINALYSIS AUTO W/SCOPE: CPT

## 2025-06-25 PROCEDURE — 83550 IRON BINDING TEST: CPT

## 2025-06-25 PROCEDURE — 84443 ASSAY THYROID STIM HORMONE: CPT

## 2025-06-25 PROCEDURE — 82728 ASSAY OF FERRITIN: CPT

## 2025-06-27 ENCOUNTER — OFFICE VISIT (OUTPATIENT)
Age: 65
End: 2025-06-27
Payer: MEDICARE

## 2025-06-27 VITALS
HEIGHT: 66 IN | BODY MASS INDEX: 30.37 KG/M2 | OXYGEN SATURATION: 97 % | DIASTOLIC BLOOD PRESSURE: 60 MMHG | WEIGHT: 189 LBS | HEART RATE: 77 BPM | SYSTOLIC BLOOD PRESSURE: 118 MMHG

## 2025-06-27 DIAGNOSIS — R94.4 DECREASED GFR: Primary | ICD-10-CM

## 2025-06-27 PROCEDURE — G2211 COMPLEX E/M VISIT ADD ON: HCPCS | Performed by: INTERNAL MEDICINE

## 2025-06-27 PROCEDURE — 99213 OFFICE O/P EST LOW 20 MIN: CPT | Performed by: INTERNAL MEDICINE

## 2025-06-27 NOTE — PROGRESS NOTES
INTERNAL MEDICINE OFFICE VISIT       NAME: Delia Morin  AGE: 64 y.o. SEX: female       : 1960        MRN: [id number]    DATE: 2025  TIME: 2:42 PM    Assessment and Plan   1. Decreased GFR (Primary)  - Denies any changes in medication regimen or supplementation, except for a recent increase in metoprolol succinate from 50 mg daily to 75 mg daily   - She takes two tylenol three times weekly before golf, denies any NSAID use, and denies any new supplements (has been taking B12)  - Discontinue hydrochlorothiazide  - US kidney and bladder with pvr; Future  - Protein electrophoresis, serum; Future  - Protein electrophoresis, urine; Future  - Urine Eosinophils; Future  - Comprehensive metabolic panel      Chief Complaint   No chief complaint on file.      History of Present Illness   Delia Morin is a 64 y.o.-year-old female who presents for an acute care visit secondary to abnormal labs, with a creatinine increase from 0.96 to 1.58 and associated decline in eGFR from 61 to 33. She denies any symptoms, including abdominal pain, flank pain, suprapubic tenderness, dysuria, hematuria, frequency, urgency, or other changes in her urine. The only change in her medications recently was an increase in metoprolol succinate from 50 mg daily to 75 mg daily per her cardiologist. She endorses compliance with her remaining medication regimen.     She otherwise also denies any recent infections, changes in over-the-counter medications and supplements (takes two Tylenol three times weekly before golf, a vitamin B12 supplement, and OTC Zyrtec which she recently discontinued), or any history of renal stones. She is feeling well overall, and we discussed further investigating this change in renal function with additional imaging and blood work, and she was in agreement with the plan.    Review of Systems   Review of Systems   Constitutional:  Negative for chills, diaphoresis, fatigue, fever and unexpected weight change.    Respiratory:  Negative for chest tightness, shortness of breath, wheezing and stridor.    Cardiovascular:  Positive for palpitations (chronic from episodes of NSVT). Negative for chest pain and leg swelling.   Gastrointestinal:  Negative for abdominal pain, constipation, diarrhea, nausea and vomiting.   Genitourinary:  Negative for decreased urine volume, dysuria, flank pain, frequency, hematuria and urgency.   Neurological:  Positive for light-headedness (intermittent from orthostatic hypotension; has been stable for years). Negative for dizziness, tremors, weakness and headaches.       Active Problem List   Problem List[1]    The following portions of the patient's history were reviewed and updated as appropriate: allergies, current medications, past family history, past medical history, past social history, past surgical history, and problem list.    Objective     Vitals:    06/27/25 1426   BP: 118/60   Pulse: 77   SpO2: 97%     Wt Readings from Last 3 Encounters:   06/27/25 85.7 kg (189 lb)   06/18/25 85.6 kg (188 lb 11.2 oz)   06/10/25 85.3 kg (188 lb)       Physical Exam  Vitals reviewed.   Constitutional:       General: She is not in acute distress.     Appearance: Normal appearance. She is not ill-appearing or diaphoretic.   HENT:      Head: Normocephalic and atraumatic.      Mouth/Throat:      Mouth: Mucous membranes are moist.      Pharynx: Oropharynx is clear.     Eyes:      General: No scleral icterus.     Extraocular Movements: Extraocular movements intact.      Conjunctiva/sclera: Conjunctivae normal.       Cardiovascular:      Rate and Rhythm: Normal rate and regular rhythm.      Heart sounds: Normal heart sounds. No murmur heard.     No friction rub. No gallop.   Pulmonary:      Effort: Pulmonary effort is normal. No respiratory distress.      Breath sounds: Normal breath sounds. No stridor. No wheezing, rhonchi or rales.   Abdominal:      General: There is no distension.      Palpations: Abdomen  is soft. There is no shifting dullness, fluid wave, mass or pulsatile mass.      Tenderness: There is no abdominal tenderness. There is no right CVA tenderness or left CVA tenderness.     Musculoskeletal:      Right lower leg: No edema.      Left lower leg: No edema.     Skin:     General: Skin is warm and dry.     Neurological:      General: No focal deficit present.      Mental Status: She is alert and oriented to person, place, and time.     Psychiatric:         Mood and Affect: Mood normal.         Behavior: Behavior normal.         Pertinent Laboratory/Diagnostic Studies:  I have reviewed pertinent labs:  Most Recent Labs:   Lab Results   Component Value Date    WBC 7.04 06/25/2025    RBC 3.56 (L) 06/25/2025    HGB 12.7 06/25/2025    HCT 38.1 06/25/2025     06/25/2025    RDW 12.6 06/25/2025    NEUTROABS 3.74 06/25/2025    SODIUM 138 06/25/2025    K 4.0 06/25/2025    CL 99 06/25/2025    CO2 28 06/25/2025    BUN 23 06/25/2025    CREATININE 1.58 (H) 06/25/2025    GLUC 122 11/05/2024    GLUF 84 06/25/2025    CALCIUM 9.3 06/25/2025    AST 24 06/25/2025    ALT 24 06/25/2025    ALKPHOS 87 06/25/2025    TP 6.9 06/25/2025    ALB 4.1 06/25/2025    TBILI 0.49 06/25/2025    CHOLESTEROL 280 (H) 06/25/2025    HDL 71 06/25/2025    TRIG 278 (H) 06/25/2025    LDLCALC 153 (H) 06/25/2025    NONHDLC 209 06/25/2025    [id number] 3.230 06/25/2025    FREET4 0.79 10/04/2023    HGBA1C 5.7 (H) 10/11/2024     10/11/2024         Orders Placed This Encounter   Procedures    US kidney and bladder with pvr    Protein electrophoresis, serum    Protein electrophoresis, urine    Urine Eosinophils    Comprehensive metabolic panel       ALLERGIES:  Allergies[2]    Current Medications   Current Medications[3]      Health Maintenance        Abby Parker DO           [1]   Patient Active Problem List  Diagnosis    SVT (supraventricular tachycardia) (HCC)    Single major depressive episode, in full remission (HCC)    Raynaud's  syndrome without gangrene    Primary hypertension    Mixed hyperlipidemia    Hepatic steatosis    Allergic rhinitis    Acquired hypothyroidism    Gastroesophageal reflux disease    History of total bilateral knee replacement (TKR)    Obesity (BMI 30-39.9)    Alcohol use   [2]   Allergies  Allergen Reactions    Amlodipine Swelling and Other (See Comments)     Ankle swelling      Atorvastatin Cough and Myalgia     Cough    Penicillins Rash    Sulfa Antibiotics Rash   [3]   Current Outpatient Medications   Medication Sig Dispense Refill    Calcium Carb-Cholecalciferol 600-400 MG-UNIT TABS Take by mouth      ezetimibe (ZETIA) 10 mg tablet Take 1 tablet (10 mg total) by mouth in the morning 90 tablet 3    levothyroxine 75 mcg tablet Take 1 tablet (75 mcg total) by mouth daily 90 tablet 3    metoprolol succinate (TOPROL-XL) 50 mg 24 hr tablet Take 1.5 tablets (75 mg total) by mouth daily 135 tablet 3    Multiple Vitamins-Minerals (multivitamin with minerals) tablet Take 1 tablet by mouth daily 30 tablet 2    naltrexone (REVIA) 50 mg tablet Take 1 tablet (50 mg total) by mouth daily 30 tablet 5    PARoxetine (PAXIL) 10 mg tablet Take 1 tablet (10 mg total) by mouth daily 90 tablet 3    triamcinolone (KENALOG) 0.1 % cream As needed      valsartan (DIOVAN) 160 mg tablet Take 1 tablet (160 mg total) by mouth daily 90 tablet 3    omeprazole (PriLOSEC) 40 MG capsule TAKE 1 CAPSULE (40 MG TOTAL) BY MOUTH 2 (TWO) TIMES A DAY FOR 30 DAYS, THEN 1 CAPSULE (40 MG TOTAL) DAILY. 90 capsule 1     No current facility-administered medications for this visit.

## 2025-07-01 ENCOUNTER — APPOINTMENT (OUTPATIENT)
Dept: LAB | Facility: CLINIC | Age: 65
End: 2025-07-01
Payer: MEDICARE

## 2025-07-01 DIAGNOSIS — R94.4 DECREASED GFR: ICD-10-CM

## 2025-07-01 LAB
ALBUMIN SERPL BCG-MCNC: 3.9 G/DL (ref 3.5–5)
ALP SERPL-CCNC: 63 U/L (ref 34–104)
ALT SERPL W P-5'-P-CCNC: 20 U/L (ref 7–52)
ANION GAP SERPL CALCULATED.3IONS-SCNC: 7 MMOL/L (ref 4–13)
AST SERPL W P-5'-P-CCNC: 23 U/L (ref 13–39)
BILIRUB SERPL-MCNC: 0.29 MG/DL (ref 0.2–1)
BUN SERPL-MCNC: 24 MG/DL (ref 5–25)
CALCIUM SERPL-MCNC: 9 MG/DL (ref 8.4–10.2)
CHLORIDE SERPL-SCNC: 106 MMOL/L (ref 96–108)
CO2 SERPL-SCNC: 27 MMOL/L (ref 21–32)
CREAT SERPL-MCNC: 1.04 MG/DL (ref 0.6–1.3)
GFR SERPL CREATININE-BSD FRML MDRD: 56 ML/MIN/1.73SQ M
GLUCOSE P FAST SERPL-MCNC: 77 MG/DL (ref 65–99)
POTASSIUM SERPL-SCNC: 4.5 MMOL/L (ref 3.5–5.3)
PROT SERPL-MCNC: 6.5 G/DL (ref 6.4–8.4)
SODIUM SERPL-SCNC: 140 MMOL/L (ref 135–147)

## 2025-07-01 PROCEDURE — 84165 PROTEIN E-PHORESIS SERUM: CPT

## 2025-07-01 PROCEDURE — 86334 IMMUNOFIX E-PHORESIS SERUM: CPT

## 2025-07-01 PROCEDURE — 80053 COMPREHEN METABOLIC PANEL: CPT

## 2025-07-01 PROCEDURE — 36415 COLL VENOUS BLD VENIPUNCTURE: CPT

## 2025-07-01 PROCEDURE — 84166 PROTEIN E-PHORESIS/URINE/CSF: CPT

## 2025-07-02 LAB
ALBUMIN SERPL ELPH-MCNC: 3.77 G/DL (ref 3.2–5.1)
ALBUMIN SERPL ELPH-MCNC: 59.8 % (ref 48–70)
ALBUMIN UR ELPH-MCNC: 100 %
ALPHA1 GLOB MFR UR ELPH: 0 %
ALPHA1 GLOB SERPL ELPH-MCNC: 0.3 G/DL (ref 0.15–0.47)
ALPHA1 GLOB SERPL ELPH-MCNC: 4.8 % (ref 1.8–7)
ALPHA2 GLOB MFR UR ELPH: 0 %
ALPHA2 GLOB SERPL ELPH-MCNC: 0.64 G/DL (ref 0.42–1.04)
ALPHA2 GLOB SERPL ELPH-MCNC: 10.1 % (ref 5.9–14.9)
B-GLOBULIN MFR UR ELPH: 0 %
BETA GLOB ABNORMAL SERPL ELPH-MCNC: 0.4 G/DL (ref 0.31–0.57)
BETA1 GLOB SERPL ELPH-MCNC: 6.3 % (ref 4.7–7.7)
BETA2 GLOB SERPL ELPH-MCNC: 4.5 % (ref 3.1–7.9)
BETA2+GAMMA GLOB SERPL ELPH-MCNC: 0.28 G/DL (ref 0.2–0.58)
GAMMA GLOB ABNORMAL SERPL ELPH-MCNC: 0.91 G/DL (ref 0.4–1.66)
GAMMA GLOB MFR UR ELPH: 0 %
GAMMA GLOB SERPL ELPH-MCNC: 14.5 % (ref 6.9–22.3)
IGG/ALB SER: 1.49 {RATIO} (ref 1.1–1.8)
PROT SERPL-MCNC: 6.3 G/DL (ref 6.4–8.4)
PROT UR-MCNC: 27.2 MG/DL

## 2025-07-02 PROCEDURE — 86334 IMMUNOFIX E-PHORESIS SERUM: CPT | Performed by: PATHOLOGY

## 2025-07-02 PROCEDURE — 84165 PROTEIN E-PHORESIS SERUM: CPT | Performed by: PATHOLOGY

## 2025-07-02 PROCEDURE — 84166 PROTEIN E-PHORESIS/URINE/CSF: CPT | Performed by: PATHOLOGY

## 2025-07-17 ENCOUNTER — OFFICE VISIT (OUTPATIENT)
Dept: GASTROENTEROLOGY | Facility: CLINIC | Age: 65
End: 2025-07-17
Attending: INTERNAL MEDICINE
Payer: MEDICARE

## 2025-07-17 VITALS
TEMPERATURE: 96.6 F | BODY MASS INDEX: 30.22 KG/M2 | SYSTOLIC BLOOD PRESSURE: 130 MMHG | HEIGHT: 66 IN | WEIGHT: 188 LBS | DIASTOLIC BLOOD PRESSURE: 82 MMHG

## 2025-07-17 DIAGNOSIS — K44.9 HIATAL HERNIA WITH GERD: Primary | ICD-10-CM

## 2025-07-17 DIAGNOSIS — K21.9 HIATAL HERNIA WITH GERD: Primary | ICD-10-CM

## 2025-07-17 DIAGNOSIS — Z12.11 SCREENING FOR COLON CANCER: ICD-10-CM

## 2025-07-17 PROCEDURE — 99203 OFFICE O/P NEW LOW 30 MIN: CPT | Performed by: PHYSICIAN ASSISTANT

## 2025-07-17 RX ORDER — OMEPRAZOLE 20 MG/1
20 CAPSULE, DELAYED RELEASE ORAL
Qty: 30 CAPSULE | Refills: 11 | Status: SHIPPED | OUTPATIENT
Start: 2025-07-17

## 2025-07-17 NOTE — PROGRESS NOTES
Name: Delia Morin      : 1960      MRN: [id number]  Encounter Provider: Jojo Logan PA-C  Encounter Date: 2025   Encounter department: [facility] [place]  :  Assessment & Plan  Hiatal hernia with GERD  Patient with a history of GERD and hiatal hernia which is stable and well-controlled on once daily PPI.  She underwent an EGD few years ago in [place] which was grossly unremarkable aside from a hiatal hernia per patient.  I do not have record of prior EGD.  Patient will obtain prior EGD records for chart.  She is otherwise feeling well without any complaints today or alarming symptoms.    No plans for EGD as patient is doing well without any alarming symptoms on once daily PPI  We will trial lower dose of omeprazole, omeprazole 20 mg once daily before breakfast and see if she tolerates at this time.  If not, patient can go back to omeprazole 40 mg once daily before breakfast   I educated and recommended patient follow a GERD diet and lifestyle including avoidance of trigger foods including fatty, greasy, spicy foods, chocolate, caffeine, alcohol, citrus foods, tomato sauces. We discussed  the importance of eating smaller frequent meals, not eating close to bedtime.  Handout on GERD and GERD diet was provided in the office today.    All questions answered.  Patient appreciative of care      Orders:    Ambulatory Referral to Gastroenterology    omeprazole (PriLOSEC) 20 mg delayed release capsule; Take 1 capsule (20 mg total) by mouth daily before breakfast    Screening for colon cancer  Per patient she is UTD on CRC screening, last colonoscopy was done in [place] a few years ago.  Patient will obtain prior colonoscopy records for our chart.  She does report a personal history of colon polyps.    Orders:    Ambulatory Referral to Gastroenterology    Patient was instructed to call the office with any questions, concerns, new/ worsening/ persisting GI symptoms.  Advised patient go to the ER with any severe or worsening abdominal pain, fevers/ chills, intractable N/V, chest pain, SOB, dizziness, lightheadedness, feeling something stuck in esophagus that will not go down. Patient expressed understanding and is in agreement with treatment plan.     Will plan to follow up in 1 year     History of Present Illness   HPI  Delia Morin is a 64 y.o. female with a past medical history of hypertension, allergies, GERD, hypothyroidism, hyperlipidemia who presents to the office today as a referral to discuss GERD and colon cancer screening and to establish care.    Patient reports history of GERD x many years.   She has been taking omeprazole 40 mg once daily in the morning for a few years with complete and adequate control of heartburn and acid reflux symptoms.  She [place].  Patient tells me that a few years ago, after being on omeprazole, she underwent EGD in [place] which was completely normal aside from a hiatal hernia (size unknown).  I do not have report from prior EGD at the time of visit today.  She has been on omeprazole 40 mg once daily since.  She has no acute GI complaints today.  She tells me she has been doing well.  She is eating and drinking well.  Bowel movements are regular, brown, formed.  Weight trend reviewed in the chart.  Patient's weight has been overall stable.  Patient denies any fevers/ chills, abdominal pain, nausea, vomiting, change in bowel habits, diarrhea, constipation, black or bloody stools,  dysphagia, odynophagia.   Denies chest pain, SOB    Tobacco use- None  Alcohol use- Patient drinks one glass of wine daily   NSAID use- None    Patient denies first-degree relative with colon cancer, inflammatory bowel disease, celiac disease     Patient's last colonoscopy was in about 2-3 years ago in [place] I do not have this report to review at time  "of visit today. Per patient she was told to come back in 5 years due to history of colon polyps. She is going to obtain prior colonoscopy records.     Review of Systems   Constitutional:  Negative for chills and fever.   HENT:  Negative for ear pain and sore throat.    Eyes:  Negative for pain and visual disturbance.   Respiratory:  Negative for cough and shortness of breath.    Cardiovascular:  Negative for chest pain and palpitations.   Gastrointestinal:  Negative for abdominal pain and vomiting.   Genitourinary:  Negative for dysuria and hematuria.   Musculoskeletal:  Negative for arthralgias and back pain.   Skin:  Negative for color change and rash.   Neurological:  Negative for seizures and syncope.   All other systems reviewed and are negative.    Objective   /82 (BP Location: Right arm, Patient Position: Sitting, Cuff Size: Adult)   Temp (!) 96.6 °F (35.9 °C) (Tympanic)   Ht 5' 6\" (1.676 m)   Wt 85.3 kg (188 lb)   BMI 30.34 kg/m²      Physical Exam  Vitals reviewed.   Constitutional:       General: She is not in acute distress.     Appearance: She is not toxic-appearing.   HENT:      Head: Normocephalic and atraumatic.     Eyes:      Extraocular Movements: Extraocular movements intact.      Conjunctiva/sclera: Conjunctivae normal.       Cardiovascular:      Rate and Rhythm: Normal rate and regular rhythm.   Pulmonary:      Effort: Pulmonary effort is normal. No respiratory distress.      Breath sounds: Normal breath sounds.   Abdominal:      General: Bowel sounds are normal.      Palpations: Abdomen is soft.      Tenderness: There is no abdominal tenderness.     Musculoskeletal:         General: No swelling or tenderness.      Cervical back: Normal range of motion and neck supple.     Skin:     General: Skin is warm and dry.      Coloration: Skin is not jaundiced.     Neurological:      General: No focal deficit present.      Mental Status: She is alert and oriented to person, place, and time. " Mental status is at baseline.     Psychiatric:         Mood and Affect: Mood normal.         Behavior: Behavior normal.         Thought Content: Thought content normal.       Lab Results   Component Value Date    WBC 7.04 06/25/2025    HGB 12.7 06/25/2025    HCT 38.1 06/25/2025     (H) 06/25/2025     06/25/2025       Lab Results   Component Value Date     10/29/2015    SODIUM 140 07/01/2025    K 4.5 07/01/2025     07/01/2025    CO2 27 07/01/2025    ANIONGAP 7 10/29/2015    AGAP 7 07/01/2025    BUN 24 07/01/2025    CREATININE 1.04 07/01/2025    GLUC 122 11/05/2024    GLUF 77 07/01/2025    CALCIUM 9.0 07/01/2025    AST 23 07/01/2025    ALT 20 07/01/2025    ALKPHOS 63 07/01/2025    PROT 7.6 10/29/2015    TP 6.5 07/01/2025    TP 6.3 (L) 07/01/2025    BILITOT 0.57 10/29/2015    TBILI 0.29 07/01/2025    EGFR 56 07/01/2025     Lab Results   Component Value Date    YLF4NOOBPMFT 3.230 06/25/2025

## 2025-07-17 NOTE — PATIENT INSTRUCTIONS
Avoid and limit trigger foods including fatty, greasy, spicy foods, chocolate, caffeine, alcohol, citrus foods, tomato sauces

## 2025-07-17 NOTE — ASSESSMENT & PLAN NOTE
Patient with a history of GERD and hiatal hernia which is stable and well-controlled on once daily PPI.  She underwent an EGD few years ago in [place] which was grossly unremarkable aside from a hiatal hernia per patient.  I do not have record of prior EGD.  Patient will obtain prior EGD records for chart.  She is otherwise feeling well without any complaints today or alarming symptoms.    No plans for EGD as patient is doing well without any alarming symptoms on once daily PPI  We will trial lower dose of omeprazole, omeprazole 20 mg once daily before breakfast and see if she tolerates at this time.  If not, patient can go back to omeprazole 40 mg once daily before breakfast   I educated and recommended patient follow a GERD diet and lifestyle including avoidance of trigger foods including fatty, greasy, spicy foods, chocolate, caffeine, alcohol, citrus foods, tomato sauces. We discussed  the importance of eating smaller frequent meals, not eating close to bedtime.  Handout on GERD and GERD diet was provided in the office today.    All questions answered.  Patient appreciative of care      Orders:    Ambulatory Referral to Gastroenterology    omeprazole (PriLOSEC) 20 mg delayed release capsule; Take 1 capsule (20 mg total) by mouth daily before breakfast

## (undated) DEVICE — ACE WRAP 6 IN UNSTERILE

## (undated) DEVICE — STIRRUP STRAP ADULT DISP

## (undated) DEVICE — SILVER-COATED ANTIMICROBIAL BARRIER DRESSING: Brand: ACTICOAT   4" X 8"

## (undated) DEVICE — CAPIT KNEE ATTUNE FB W/DOM AOX

## (undated) DEVICE — 3 BONE CEMENT MIXER: Brand: MIXEVAC

## (undated) DEVICE — CAST PADDING 4 IN UNSTERILE

## (undated) DEVICE — TRAY FOLEY 16FR URIMETER SILICONE SURESTEP

## (undated) DEVICE — PADDING CAST 4 IN  COTTON STRL

## (undated) DEVICE — GLOVE INDICATOR PI UNDERGLOVE SZ 8.5 BLUE

## (undated) DEVICE — PAD CAST 6 IN COTTON NON STERILE

## (undated) DEVICE — DRAPE SHEET THREE QUARTER

## (undated) DEVICE — ASTOUND STANDARD SURGICAL GOWN, XL: Brand: CONVERTORS

## (undated) DEVICE — ACE WRAP 6 IN STERILE

## (undated) DEVICE — STERILE PITCHER PACK: Brand: CARDINAL HEALTH

## (undated) DEVICE — HOOD WITH PEEL AWAY FACE SHIELD: Brand: T7PLUS

## (undated) DEVICE — VELYS BLADE SAW OSC 85 X 19 X 2MM

## (undated) DEVICE — NO-SCRATCH ™ SMALL WHITNEY CURETTE ™ IS A SINGLE-USE, PLASTIC CURETTE FOR QUICKLY APPLYING, MANIPULATING AND REMOVING BONE CEMENT DURING HIP AND KNEE REPLACEMENT SURGERY. THE PLASTIC IS SOFTER THAN STEEL INSTRUMENTS, REDUCING THE RISK OF DAMAGING THE PROSTHESIS WITH METAL INSTRUMENTS.  THE CURETTE’S 6MM TIP REMOVES EXCESS CEMENT FROM REPLACEMENT HIPS AND KNEES. EASY-TO-MANEUVER, THE SMALL BLUE CURETTE LETS YOU REMOVE CEMENT FROM ALL EDGES OF THE PROSTHESIS.NO-SCRATCH WHITNEY SMALL CURETTE FEATURES:SAFER THAN STEEL- MADE OF PLASTIC - STURDY YET SOFTER THAN SURGICAL STEEL.HANDIER- EACH TOOL HAS A MOLDED-IN THUMB INDENTATION INSTANTLY ORIENTING THE TOOL.- EASIER TO MANEUVER IN HARD TO SEE PLACES.- COLOR-CODED FOR EASY IDENTIFICATION.FASTER- COMES INDIVIDUALLY PACKAGED IN STERILE, PEEL OPEN POUCH, READY TO GO.- APPLIES, MANIPULATES, OR REMOVES CEMENT WITH FINGERTIP PRECISION.ECONOMICAL- THE COST OF A SINGLE REVISION DWARFS THE COST OF A SINGLE-USE CURETTE. - DISPOSABLE – THERE’S NO NEED TO WASTE TIME REMOVING HARDENED CEMENT OR RE-STERILIZING TOOLS.- LESS EXPENSIVE TO BUY AND INVENTORY - ORDER ONLY THE TOOL YOU USE.- PACKAGED 25 INDIVIDUALLY WRAPPED TOOLS TO A CARTON FOR CONVENIENT SHELF STORAGE.: Brand: WHITNEY NO-SCRATCH CURETTE (SMALL)

## (undated) DEVICE — GLOVE SRG BIOGEL 8

## (undated) DEVICE — VELYS ROBOT DRAPE

## (undated) DEVICE — RECIPROCATING BLADE, DOUBLE SIDED, OFFSET  (70.0 X 0.64 X 12.6MM)

## (undated) DEVICE — COOL TEMP PAD

## (undated) DEVICE — WET SKIN PREP TRAY: Brand: MEDLINE INDUSTRIES, INC.

## (undated) DEVICE — GLOVE INDICATOR PI UNDERGLOVE SZ 7 BLUE

## (undated) DEVICE — PENCIL ELECTROSURG E-Z CLEAN -0035H

## (undated) DEVICE — HOOD: Brand: T7PLUS

## (undated) DEVICE — VELYS SATEL DRAPE

## (undated) DEVICE — DRAPE EQUIPMENT RF WAND

## (undated) DEVICE — VELYS ROBOTIC-ASSISTED SOLUTION ARRAY SET - KNEE: Brand: VELYS

## (undated) DEVICE — VELYS ROBOT-ASSISTED SOLUTION ARRAY DRILL PIN 125 MM X 4 MM: Brand: VELYS

## (undated) DEVICE — GLOVE INDICATOR PI UNDERGLOVE SZ 7.5 BLUE

## (undated) DEVICE — ABDOMINAL PAD: Brand: DERMACEA

## (undated) DEVICE — SUT VICRYL PLUS 1 CTB-1 36 IN VCPB947H

## (undated) DEVICE — DRAPE SHEET X-LG

## (undated) DEVICE — PLUMEPEN PRO 10FT

## (undated) DEVICE — GAUZE SPONGES,16 PLY: Brand: CURITY

## (undated) DEVICE — 3M™ IOBAN™ 2 ANTIMICROBIAL INCISE DRAPE 6650EZ: Brand: IOBAN™ 2

## (undated) DEVICE — STOCKINETTE REGULAR

## (undated) DEVICE — TRAY FOLEY 16FR URIMETER SURESTEP

## (undated) DEVICE — GLOVE INDICATOR PI UNDERGLOVE SZ 8 BLUE

## (undated) DEVICE — JP 3-SPRING RES W/10FR PVC DRAIN/TR: Brand: CARDINAL HEALTH

## (undated) DEVICE — HANDPIECE SET WITH RETRACTABLE COAXIAL FAN SPRAY TIP AND SUCTION TUBE: Brand: INTERPULSE

## (undated) DEVICE — BAG POLY CLEAR 12 X 8 X 30

## (undated) DEVICE — SUT VICRYL PLUS 2-0 CTB-1 27 IN VCPB259H

## (undated) DEVICE — BETHLEHEM UNIV TOTAL KNEE, KIT: Brand: CARDINAL HEALTH

## (undated) DEVICE — GLOVE SRG BIOGEL 6.5

## (undated) DEVICE — DUAL CUT SAGITTAL BLADE

## (undated) DEVICE — SAW BLADE OSCILLATING BRAZOL 167

## (undated) DEVICE — BLADE SAW RECIP 12.5 X 76MM 0.9/0.9MM THCK

## (undated) DEVICE — GLOVE SRG BIOGEL 7.5

## (undated) DEVICE — SKN PRP WNG SPNGE PVP SCRB STR: Brand: MEDLINE INDUSTRIES, INC.